# Patient Record
Sex: MALE | Race: WHITE | NOT HISPANIC OR LATINO | Employment: OTHER | URBAN - METROPOLITAN AREA
[De-identification: names, ages, dates, MRNs, and addresses within clinical notes are randomized per-mention and may not be internally consistent; named-entity substitution may affect disease eponyms.]

---

## 2017-01-04 ENCOUNTER — APPOINTMENT (OUTPATIENT)
Dept: PHYSICAL THERAPY | Facility: HOME HEALTHCARE | Age: 57
End: 2017-01-04
Payer: COMMERCIAL

## 2017-01-04 ENCOUNTER — HOSPITAL ENCOUNTER (OUTPATIENT)
Dept: MRI IMAGING | Facility: HOSPITAL | Age: 57
Discharge: HOME | End: 2017-01-04
Payer: COMMERCIAL

## 2017-01-04 DIAGNOSIS — M54.9 BACK PAIN, UNSPECIFIED BACK LOCATION, UNSPECIFIED BACK PAIN LATERALITY, UNSPECIFIED CHRONICITY: ICD-10-CM

## 2017-01-04 PROCEDURE — 72148 MRI LUMBAR SPINE W/O DYE: CPT

## 2017-01-06 ENCOUNTER — APPOINTMENT (OUTPATIENT)
Dept: PHYSICAL THERAPY | Facility: HOME HEALTHCARE | Age: 57
End: 2017-01-06
Payer: COMMERCIAL

## 2017-01-06 PROCEDURE — 97164 PT RE-EVAL EST PLAN CARE: CPT | Performed by: PHYSICAL THERAPIST

## 2017-01-06 PROCEDURE — G8982 BODY POS GOAL STATUS: HCPCS | Performed by: PHYSICAL THERAPIST

## 2017-01-06 PROCEDURE — G8981 BODY POS CURRENT STATUS: HCPCS | Performed by: PHYSICAL THERAPIST

## 2017-01-06 PROCEDURE — 97140 MANUAL THERAPY 1/> REGIONS: CPT | Performed by: PHYSICAL THERAPIST

## 2017-01-06 PROCEDURE — G8983 BODY POS D/C STATUS: HCPCS | Performed by: PHYSICAL THERAPIST

## 2017-01-06 PROCEDURE — 97110 THERAPEUTIC EXERCISES: CPT | Performed by: PHYSICAL THERAPIST

## 2018-06-13 LAB
BILIRUB UR QL STRIP: NEGATIVE
CLARITY UR: CLEAR
COLOR UR: YELLOW
GLUCOSE UR STRIP-MCNC: NEGATIVE MG/DL
HGB UR QL STRIP.AUTO: NEGATIVE
KETONES UR STRIP-MCNC: NEGATIVE MG/DL
LEUKOCYTE ESTERASE UR QL STRIP: NEGATIVE
NITRITE UR QL STRIP: NEGATIVE
PH UR STRIP.AUTO: 7 [PH] (ref 4.5–8)
PROT UR STRIP-MCNC: NEGATIVE MG/DL
SP GR UR STRIP.AUTO: 1.01 (ref 1–1.03)
UROBILINOGEN UR QL STRIP.AUTO: 0.2 EU/DL (ref 0.2–8)

## 2018-07-03 ENCOUNTER — LAB REQUISITION (OUTPATIENT)
Dept: LAB | Facility: HOSPITAL | Age: 58
End: 2018-07-03
Payer: COMMERCIAL

## 2018-07-03 DIAGNOSIS — L98.9 DISORDER OF SKIN OR SUBCUTANEOUS TISSUE: ICD-10-CM

## 2018-07-03 PROCEDURE — 88305 TISSUE EXAM BY PATHOLOGIST: CPT | Performed by: PATHOLOGY

## 2018-07-12 ENCOUNTER — TRANSCRIBE ORDERS (OUTPATIENT)
Dept: LAB | Facility: MEDICAL CENTER | Age: 58
End: 2018-07-12

## 2018-07-12 ENCOUNTER — APPOINTMENT (OUTPATIENT)
Dept: LAB | Facility: MEDICAL CENTER | Age: 58
End: 2018-07-12
Payer: COMMERCIAL

## 2018-07-12 DIAGNOSIS — M19.90 ARTHRITIS: ICD-10-CM

## 2018-07-12 DIAGNOSIS — E78.5 HYPERLIPIDEMIA, UNSPECIFIED HYPERLIPIDEMIA TYPE: ICD-10-CM

## 2018-07-12 DIAGNOSIS — I10 HYPERTENSION, UNSPECIFIED TYPE: ICD-10-CM

## 2018-07-12 DIAGNOSIS — R35.1 NOCTURIA: ICD-10-CM

## 2018-07-12 DIAGNOSIS — M19.90 ARTHRITIS: Primary | ICD-10-CM

## 2018-07-12 DIAGNOSIS — R53.83 FATIGUE, UNSPECIFIED TYPE: ICD-10-CM

## 2018-07-12 DIAGNOSIS — N40.1 BENIGN PROSTATIC HYPERPLASIA WITH LOWER URINARY TRACT SYMPTOMS, SYMPTOM DETAILS UNSPECIFIED: ICD-10-CM

## 2018-07-12 LAB
ALBUMIN SERPL BCP-MCNC: 4.2 G/DL (ref 3.5–5)
ALP SERPL-CCNC: 54 U/L (ref 46–116)
ALT SERPL W P-5'-P-CCNC: 36 U/L (ref 12–78)
ANION GAP SERPL CALCULATED.3IONS-SCNC: 8 MMOL/L (ref 4–13)
AST SERPL W P-5'-P-CCNC: 20 U/L (ref 5–45)
BILIRUB SERPL-MCNC: 0.68 MG/DL (ref 0.2–1)
BUN SERPL-MCNC: 14 MG/DL (ref 5–25)
CALCIUM SERPL-MCNC: 9.1 MG/DL (ref 8.3–10.1)
CHLORIDE SERPL-SCNC: 105 MMOL/L (ref 100–108)
CHOLEST SERPL-MCNC: 244 MG/DL (ref 50–200)
CO2 SERPL-SCNC: 26 MMOL/L (ref 21–32)
CREAT SERPL-MCNC: 1.18 MG/DL (ref 0.6–1.3)
ERYTHROCYTE [DISTWIDTH] IN BLOOD BY AUTOMATED COUNT: 13 % (ref 11.6–15.1)
GFR SERPL CREATININE-BSD FRML MDRD: 67 ML/MIN/1.73SQ M
GLUCOSE P FAST SERPL-MCNC: 91 MG/DL (ref 65–99)
HCT VFR BLD AUTO: 39.1 % (ref 36.5–49.3)
HDLC SERPL-MCNC: 33 MG/DL (ref 40–60)
HGB BLD-MCNC: 13 G/DL (ref 12–17)
LDLC SERPL CALC-MCNC: 157 MG/DL (ref 0–100)
MCH RBC QN AUTO: 31.3 PG (ref 26.8–34.3)
MCHC RBC AUTO-ENTMCNC: 33.2 G/DL (ref 31.4–37.4)
MCV RBC AUTO: 94 FL (ref 82–98)
NONHDLC SERPL-MCNC: 211 MG/DL
PLATELET # BLD AUTO: 205 THOUSANDS/UL (ref 149–390)
PMV BLD AUTO: 11.8 FL (ref 8.9–12.7)
POTASSIUM SERPL-SCNC: 4.2 MMOL/L (ref 3.5–5.3)
PROT SERPL-MCNC: 7.6 G/DL (ref 6.4–8.2)
PSA SERPL-MCNC: 2 NG/ML (ref 0–4)
RBC # BLD AUTO: 4.16 MILLION/UL (ref 3.88–5.62)
SODIUM SERPL-SCNC: 139 MMOL/L (ref 136–145)
TRIGL SERPL-MCNC: 269 MG/DL
WBC # BLD AUTO: 4.07 THOUSAND/UL (ref 4.31–10.16)

## 2018-07-12 PROCEDURE — 80061 LIPID PANEL: CPT

## 2018-07-12 PROCEDURE — 85027 COMPLETE CBC AUTOMATED: CPT

## 2018-07-12 PROCEDURE — 80053 COMPREHEN METABOLIC PANEL: CPT

## 2018-07-12 PROCEDURE — 36415 COLL VENOUS BLD VENIPUNCTURE: CPT

## 2018-07-12 PROCEDURE — G0103 PSA SCREENING: HCPCS

## 2018-08-09 ENCOUNTER — TELEPHONE (OUTPATIENT)
Dept: UROLOGY | Facility: AMBULATORY SURGERY CENTER | Age: 58
End: 2018-08-09

## 2018-08-09 NOTE — TELEPHONE ENCOUNTER
Received records for a new patient appt  Called patient and patient stated that he will call back another time since its not an emergency and his schedule is to booked up at the moment  Scanned documents to central fax and put in box at check in

## 2019-04-19 ENCOUNTER — TRANSCRIBE ORDERS (OUTPATIENT)
Dept: ADMINISTRATIVE | Facility: HOSPITAL | Age: 59
End: 2019-04-19

## 2019-04-19 DIAGNOSIS — R91.1 COIN LESION: ICD-10-CM

## 2019-04-19 DIAGNOSIS — Z87.09 PERSONAL HISTORY OF UNSPECIFIED DISEASE OF RESPIRATORY SYSTEM: Primary | ICD-10-CM

## 2019-04-22 ENCOUNTER — HOSPITAL ENCOUNTER (OUTPATIENT)
Dept: CT IMAGING | Facility: HOSPITAL | Age: 59
Discharge: HOME | End: 2019-04-22
Payer: COMMERCIAL

## 2019-04-22 DIAGNOSIS — Z87.09 PERSONAL HISTORY OF UNSPECIFIED DISEASE OF RESPIRATORY SYSTEM: ICD-10-CM

## 2019-04-22 DIAGNOSIS — R91.1 COIN LESION: ICD-10-CM

## 2019-04-22 PROCEDURE — 71250 CT THORAX DX C-: CPT

## 2019-07-22 ENCOUNTER — APPOINTMENT (OUTPATIENT)
Dept: LAB | Facility: MEDICAL CENTER | Age: 59
End: 2019-07-22
Payer: COMMERCIAL

## 2019-07-22 ENCOUNTER — TRANSCRIBE ORDERS (OUTPATIENT)
Dept: LAB | Facility: MEDICAL CENTER | Age: 59
End: 2019-07-22

## 2019-07-22 DIAGNOSIS — R53.83 OTHER FATIGUE: ICD-10-CM

## 2019-07-22 DIAGNOSIS — C44.91 BASAL CELL CARCINOMA (BCC), UNSPECIFIED SITE: Primary | ICD-10-CM

## 2019-07-22 DIAGNOSIS — N40.0 BENIGN PROSTATIC HYPERPLASIA, UNSPECIFIED WHETHER LOWER URINARY TRACT SYMPTOMS PRESENT: ICD-10-CM

## 2019-07-22 DIAGNOSIS — R35.1 NOCTURIA: ICD-10-CM

## 2019-07-22 DIAGNOSIS — Z01.812 PRE-OPERATIVE LABORATORY EXAMINATION: ICD-10-CM

## 2019-07-22 DIAGNOSIS — C44.91 BASAL CELL CARCINOMA (BCC), UNSPECIFIED SITE: ICD-10-CM

## 2019-07-22 LAB
ALBUMIN SERPL BCP-MCNC: 4 G/DL (ref 3.5–5)
ALP SERPL-CCNC: 59 U/L (ref 46–116)
ALT SERPL W P-5'-P-CCNC: 36 U/L (ref 12–78)
ANION GAP SERPL CALCULATED.3IONS-SCNC: 4 MMOL/L (ref 4–13)
AST SERPL W P-5'-P-CCNC: 19 U/L (ref 5–45)
BILIRUB SERPL-MCNC: 0.43 MG/DL (ref 0.2–1)
BUN SERPL-MCNC: 16 MG/DL (ref 5–25)
CALCIUM SERPL-MCNC: 8.8 MG/DL (ref 8.3–10.1)
CHLORIDE SERPL-SCNC: 106 MMOL/L (ref 100–108)
CO2 SERPL-SCNC: 26 MMOL/L (ref 21–32)
CREAT SERPL-MCNC: 1.09 MG/DL (ref 0.6–1.3)
ERYTHROCYTE [DISTWIDTH] IN BLOOD BY AUTOMATED COUNT: 12.5 % (ref 11.6–15.1)
GFR SERPL CREATININE-BSD FRML MDRD: 73 ML/MIN/1.73SQ M
GLUCOSE P FAST SERPL-MCNC: 100 MG/DL (ref 65–99)
HCT VFR BLD AUTO: 40.2 % (ref 36.5–49.3)
HGB BLD-MCNC: 13.7 G/DL (ref 12–17)
MCH RBC QN AUTO: 31.6 PG (ref 26.8–34.3)
MCHC RBC AUTO-ENTMCNC: 34.1 G/DL (ref 31.4–37.4)
MCV RBC AUTO: 93 FL (ref 82–98)
PLATELET # BLD AUTO: 200 THOUSANDS/UL (ref 149–390)
PMV BLD AUTO: 12.2 FL (ref 8.9–12.7)
POTASSIUM SERPL-SCNC: 4.2 MMOL/L (ref 3.5–5.3)
PROT SERPL-MCNC: 7.4 G/DL (ref 6.4–8.2)
PSA SERPL-MCNC: 2.7 NG/ML (ref 0–4)
RBC # BLD AUTO: 4.33 MILLION/UL (ref 3.88–5.62)
SODIUM SERPL-SCNC: 136 MMOL/L (ref 136–145)
WBC # BLD AUTO: 4.65 THOUSAND/UL (ref 4.31–10.16)

## 2019-07-22 PROCEDURE — G0103 PSA SCREENING: HCPCS

## 2019-07-22 PROCEDURE — 85027 COMPLETE CBC AUTOMATED: CPT

## 2019-07-22 PROCEDURE — 36415 COLL VENOUS BLD VENIPUNCTURE: CPT

## 2019-07-22 PROCEDURE — 80053 COMPREHEN METABOLIC PANEL: CPT

## 2020-07-14 ENCOUNTER — APPOINTMENT (OUTPATIENT)
Dept: RADIOLOGY | Facility: CLINIC | Age: 60
End: 2020-07-14
Payer: COMMERCIAL

## 2020-07-14 ENCOUNTER — OFFICE VISIT (OUTPATIENT)
Dept: URGENT CARE | Facility: CLINIC | Age: 60
End: 2020-07-14
Payer: COMMERCIAL

## 2020-07-14 VITALS
WEIGHT: 215 LBS | OXYGEN SATURATION: 99 % | BODY MASS INDEX: 30.1 KG/M2 | HEART RATE: 69 BPM | SYSTOLIC BLOOD PRESSURE: 136 MMHG | HEIGHT: 71 IN | RESPIRATION RATE: 18 BRPM | TEMPERATURE: 97 F | DIASTOLIC BLOOD PRESSURE: 65 MMHG

## 2020-07-14 DIAGNOSIS — R07.81 RIB PAIN ON LEFT SIDE: ICD-10-CM

## 2020-07-14 DIAGNOSIS — S22.32XA CLOSED FRACTURE OF ONE RIB OF LEFT SIDE, INITIAL ENCOUNTER: Primary | ICD-10-CM

## 2020-07-14 PROCEDURE — S9083 URGENT CARE CENTER GLOBAL: HCPCS | Performed by: PHYSICIAN ASSISTANT

## 2020-07-14 PROCEDURE — 99213 OFFICE O/P EST LOW 20 MIN: CPT | Performed by: PHYSICIAN ASSISTANT

## 2020-07-14 PROCEDURE — 71101 X-RAY EXAM UNILAT RIBS/CHEST: CPT

## 2020-07-14 RX ORDER — IBUPROFEN 800 MG/1
TABLET ORAL
COMMUNITY
Start: 2014-09-12

## 2020-07-14 RX ORDER — TAMSULOSIN HYDROCHLORIDE 0.4 MG/1
CAPSULE ORAL
COMMUNITY
Start: 2016-11-09

## 2020-07-14 RX ORDER — GABAPENTIN 300 MG/1
CAPSULE ORAL
COMMUNITY
Start: 2016-02-07

## 2020-07-14 RX ORDER — HYDROCODONE BITARTRATE AND ACETAMINOPHEN 5; 325 MG/1; MG/1
TABLET ORAL
COMMUNITY

## 2020-07-14 NOTE — PROGRESS NOTES
[facility]        NAME: Wu Aaron is a 62 y o  male  : 1960    MRN: [id number]  DATE: 2020  TIME: 12:47 PM    Assessment and Plan   Closed fracture of one rib of left side, initial encounter [S22 32XA]  1  Closed fracture of one rib of left side, initial encounter     2  Rib pain on left side  XR ribs left w pa chest min 3 views         Patient Instructions       Follow up with PCP in 3-5 days  Proceed to  ER if symptoms worsen  Chief Complaint     Chief Complaint   Patient presents with    Chest Pain     left rib pain after a fall 2 days ago with difficulty deep breathing         History of Present Illness       Patient presents with a 2 day history of left rib pain after falling in his garage  Pain is made worse with certain movements and taking deep breaths  Review of Systems   Review of Systems   Constitutional: Negative for chills and fever  Respiratory: Negative for cough and shortness of breath  Gastrointestinal: Negative for nausea and vomiting  Musculoskeletal: Positive for neck pain  Skin: Negative for rash  Neurological: Negative for weakness and numbness  Hematological: Negative for adenopathy           Current Medications       Current Outpatient Medications:     gabapentin (NEURONTIN) 300 mg capsule, gabapentin 300 mg capsule, Disp: , Rfl:     HYDROcodone-acetaminophen (NORCO) 5-325 mg per tablet, hydrocodone 5 mg-acetaminophen 325 mg tablet, Disp: , Rfl:     ibuprofen (MOTRIN) 800 mg tablet, ibuprofen 800 mg tablet, Disp: , Rfl:     tamsulosin (FLOMAX) 0 4 mg, tamsulosin 0 4 mg capsule, Disp: , Rfl:     Current Allergies     Allergies as of 2020 - Reviewed 2020   Allergen Reaction Noted    Acetaminophen Other (See Comments) 2014            The following portions of the patient's history were reviewed and updated as appropriate: allergies, current medications, past family history, past medical history, past social history, past surgical history and problem list      History reviewed  No pertinent past medical history  Past Surgical History:   Procedure Laterality Date    CERVICAL FUSION         History reviewed  No pertinent family history  Medications have been verified  Objective   /65   Pulse 69   Temp (!) 97 °F (36 1 °C) (Tympanic)   Resp 18   Ht 5' 10 5" (1 791 m)   Wt 97 5 kg (215 lb)   SpO2 99%   BMI 30 41 kg/m²        Physical Exam     Physical Exam   Constitutional: He appears well-developed and well-nourished  HENT:   Head: Normocephalic and atraumatic  Cardiovascular: Normal rate and regular rhythm  Pulmonary/Chest: Effort normal                Neurological: He is alert  Skin: Skin is warm and dry  Psychiatric: He has a normal mood and affect  Nursing note and vitals reviewed  Rib x-ray viewed by me and independently reviewed by me contemporaneously as questionable posterior 8th rib fracture

## 2020-07-14 NOTE — PATIENT INSTRUCTIONS
Rib Fracture   WHAT YOU NEED TO KNOW:   A rib fracture is a crack or break in a rib bone  Rib fractures usually heal within 6 weeks  You should be able to return to normal activities before that time  Do not wrap anything around your body to try to splint your ribs  This can prevent you from taking deep breaths and increases your risk for pneumonia  DISCHARGE INSTRUCTIONS:   Call 911 for any of the following:   · You have trouble breathing  · You have new or increased pain  Return to the emergency department if:   · Your pain does not get better, even after treatment  · You have a fever or a cough  Contact your healthcare provider if:   · You have questions or concerns about your condition or care  Medicines:   · NSAIDs  help decrease swelling and pain  NSAIDs are available without a doctor's order  Ask your healthcare provider which medicine is right for you  Ask how much to take and when to take it  Take as directed  NSAIDs can cause stomach bleeding and kidney problems if not taken correctly  · Prescription pain medicine  may be given  Ask your healthcare provider how to take this medicine safely  Some prescription pain medicines contain acetaminophen  Do not take other medicines that contain acetaminophen without talking to your healthcare provider  Too much acetaminophen may cause liver damage  Prescription pain medicine may cause constipation  Ask your healthcare provider how to prevent or treat constipation  · Take your medicine as directed  Contact your healthcare provider if you think your medicine is not helping or if you have side effects  Tell him or her if you are allergic to any medicine  Keep a list of the medicines, vitamins, and herbs you take  Include the amounts, and when and why you take them  Bring the list or the pill bottles to follow-up visits  Carry your medicine list with you in case of an emergency    Follow up with your healthcare provider as directed:  Write down your questions so you remember to ask them during your visits  Deep breathing:  Deep breathing will decrease your risk for pneumonia  Hug a pillow on the injured side while doing this exercise, to decrease pain  Take a deep breath and hold it for as long as possible  You should let the air out and then cough strongly  Deep breaths help open your airway  You may be given an incentive spirometer to help take deep breaths  Put the plastic piece in your mouth  Take a slow, deep breath  You should then let the air out and cough  Repeat these steps 10 times every hour  Rest:  Rest and limit activity to decrease swelling and pain, and allow your injury to heal  Avoid activities that may cause more pain or damage to your ribs such as, pulling, pushing, and lifting  As your pain decreases, begin movements slowly  Take short walks between rest periods  Ice:  Apply ice on the fractured area for 15 to 20 minutes every hour or as directed  Use an ice pack or put crushed ice in a plastic bag  Cover it with a towel  Ice helps prevent tissue damage and decreases swelling and pain  © 2017 [facility] Information is for End User's use only and may not be sold, redistributed or otherwise used for commercial purposes  All illustrations and images included in CareNotes® are the copyrighted property of A D A M , Inc  or [employer]  The above information is an  only  It is not intended as medical advice for individual conditions or treatments  Talk to your doctor, nurse or pharmacist before following any medical regimen to see if it is safe and effective for you

## 2020-10-14 ENCOUNTER — OFFICE VISIT (OUTPATIENT)
Dept: URGENT CARE | Facility: CLINIC | Age: 60
End: 2020-10-14
Payer: COMMERCIAL

## 2020-10-14 VITALS
OXYGEN SATURATION: 97 % | SYSTOLIC BLOOD PRESSURE: 125 MMHG | DIASTOLIC BLOOD PRESSURE: 66 MMHG | HEIGHT: 70 IN | TEMPERATURE: 97.7 F | RESPIRATION RATE: 18 BRPM | WEIGHT: 208 LBS | BODY MASS INDEX: 29.78 KG/M2 | HEART RATE: 68 BPM

## 2020-10-14 DIAGNOSIS — S30.860A TICK BITE OF LOWER BACK, INITIAL ENCOUNTER: Primary | ICD-10-CM
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
## 2020-10-14 PROCEDURE — 99213 OFFICE O/P EST LOW 20 MIN: CPT | Performed by: PHYSICIAN ASSISTANT

## 2020-10-14 PROCEDURE — S9083 URGENT CARE CENTER GLOBAL: HCPCS | Performed by: PHYSICIAN ASSISTANT

## 2020-10-14 RX ORDER — DOXYCYCLINE 100 MG/1
TABLET ORAL
Qty: 2 TABLET | Refills: 0 | Status: SHIPPED | OUTPATIENT
Start: 2020-10-14 | End: 2020-10-15

## 2020-10-27 ENCOUNTER — TRANSCRIBE ORDERS (OUTPATIENT)
Dept: LAB | Facility: MEDICAL CENTER | Age: 60
End: 2020-10-27

## 2020-10-27 ENCOUNTER — LAB (OUTPATIENT)
Dept: LAB | Facility: MEDICAL CENTER | Age: 60
End: 2020-10-27
Payer: COMMERCIAL

## 2020-10-27 DIAGNOSIS — N40.0 BENIGN PROSTATIC HYPERPLASIA, UNSPECIFIED WHETHER LOWER URINARY TRACT SYMPTOMS PRESENT: Primary | ICD-10-CM

## 2020-10-27 DIAGNOSIS — R35.1 NOCTURIA: ICD-10-CM

## 2020-10-27 DIAGNOSIS — N40.0 BENIGN PROSTATIC HYPERPLASIA, UNSPECIFIED WHETHER LOWER URINARY TRACT SYMPTOMS PRESENT: ICD-10-CM

## 2020-10-27 DIAGNOSIS — E78.5 HYPERLIPIDEMIA, UNSPECIFIED HYPERLIPIDEMIA TYPE: ICD-10-CM

## 2020-10-27 DIAGNOSIS — R53.83 FATIGUE, UNSPECIFIED TYPE: ICD-10-CM

## 2020-10-27 LAB
ALBUMIN SERPL BCP-MCNC: 4.1 G/DL (ref 3.5–5)
ALP SERPL-CCNC: 77 U/L (ref 46–116)
ALT SERPL W P-5'-P-CCNC: 37 U/L (ref 12–78)
ANION GAP SERPL CALCULATED.3IONS-SCNC: 3 MMOL/L (ref 4–13)
AST SERPL W P-5'-P-CCNC: 19 U/L (ref 5–45)
BASOPHILS # BLD AUTO: 0.07 THOUSANDS/ΜL (ref 0–0.1)
BASOPHILS NFR BLD AUTO: 1 % (ref 0–1)
BILIRUB SERPL-MCNC: 0.58 MG/DL (ref 0.2–1)
BUN SERPL-MCNC: 13 MG/DL (ref 5–25)
CALCIUM SERPL-MCNC: 8.8 MG/DL (ref 8.3–10.1)
CHLORIDE SERPL-SCNC: 106 MMOL/L (ref 100–108)
CHOLEST SERPL-MCNC: 217 MG/DL (ref 50–200)
CO2 SERPL-SCNC: 29 MMOL/L (ref 21–32)
CREAT SERPL-MCNC: 1.13 MG/DL (ref 0.6–1.3)
EOSINOPHIL # BLD AUTO: 0.25 THOUSAND/ΜL (ref 0–0.61)
EOSINOPHIL NFR BLD AUTO: 4 % (ref 0–6)
ERYTHROCYTE [DISTWIDTH] IN BLOOD BY AUTOMATED COUNT: 13.2 % (ref 11.6–15.1)
GFR SERPL CREATININE-BSD FRML MDRD: 69 ML/MIN/1.73SQ M
GLUCOSE P FAST SERPL-MCNC: 98 MG/DL (ref 65–99)
HCT VFR BLD AUTO: 41.7 % (ref 36.5–49.3)
HDLC SERPL-MCNC: 35 MG/DL
HGB BLD-MCNC: 14.1 G/DL (ref 12–17)
IMM GRANULOCYTES # BLD AUTO: 0.02 THOUSAND/UL (ref 0–0.2)
IMM GRANULOCYTES NFR BLD AUTO: 0 % (ref 0–2)
LDLC SERPL CALC-MCNC: 152 MG/DL (ref 0–100)
LYMPHOCYTES # BLD AUTO: 1.2 THOUSANDS/ΜL (ref 0.6–4.47)
LYMPHOCYTES NFR BLD AUTO: 21 % (ref 14–44)
MCH RBC QN AUTO: 31.5 PG (ref 26.8–34.3)
MCHC RBC AUTO-ENTMCNC: 33.8 G/DL (ref 31.4–37.4)
MCV RBC AUTO: 93 FL (ref 82–98)
MONOCYTES # BLD AUTO: 0.54 THOUSAND/ΜL (ref 0.17–1.22)
MONOCYTES NFR BLD AUTO: 10 % (ref 4–12)
NEUTROPHILS # BLD AUTO: 3.54 THOUSANDS/ΜL (ref 1.85–7.62)
NEUTS SEG NFR BLD AUTO: 64 % (ref 43–75)
NONHDLC SERPL-MCNC: 182 MG/DL
NRBC BLD AUTO-RTO: 0 /100 WBCS
PLATELET # BLD AUTO: 233 THOUSANDS/UL (ref 149–390)
PMV BLD AUTO: 10.4 FL (ref 8.9–12.7)
POTASSIUM SERPL-SCNC: 4.3 MMOL/L (ref 3.5–5.3)
PROT SERPL-MCNC: 7.6 G/DL (ref 6.4–8.2)
PSA SERPL-MCNC: 3.6 NG/ML (ref 0–4)
RBC # BLD AUTO: 4.47 MILLION/UL (ref 3.88–5.62)
SODIUM SERPL-SCNC: 138 MMOL/L (ref 136–145)
TRIGL SERPL-MCNC: 149 MG/DL
WBC # BLD AUTO: 5.62 THOUSAND/UL (ref 4.31–10.16)

## 2020-10-27 PROCEDURE — 85025 COMPLETE CBC W/AUTO DIFF WBC: CPT

## 2020-10-27 PROCEDURE — G0103 PSA SCREENING: HCPCS

## 2020-10-27 PROCEDURE — 36415 COLL VENOUS BLD VENIPUNCTURE: CPT

## 2020-10-27 PROCEDURE — 80053 COMPREHEN METABOLIC PANEL: CPT

## 2020-10-27 PROCEDURE — 80061 LIPID PANEL: CPT

## 2021-01-04 DIAGNOSIS — Z20.828 EXPOSURE TO SARS-ASSOCIATED CORONAVIRUS: ICD-10-CM

## 2021-01-04 PROCEDURE — U0003 INFECTIOUS AGENT DETECTION BY NUCLEIC ACID (DNA OR RNA); SEVERE ACUTE RESPIRATORY SYNDROME CORONAVIRUS 2 (SARS-COV-2) (CORONAVIRUS DISEASE [COVID-19]), AMPLIFIED PROBE TECHNIQUE, MAKING USE OF HIGH THROUGHPUT TECHNOLOGIES AS DESCRIBED BY CMS-2020-01-R: HCPCS | Performed by: NURSE PRACTITIONER

## 2021-01-07 LAB — SARS-COV-2 RNA SPEC QL NAA+PROBE: DETECTED

## 2021-06-30 ENCOUNTER — OFFICE VISIT (OUTPATIENT)
Dept: URGENT CARE | Facility: CLINIC | Age: 61
End: 2021-06-30
Payer: COMMERCIAL

## 2021-06-30 VITALS
DIASTOLIC BLOOD PRESSURE: 77 MMHG | WEIGHT: 219.4 LBS | OXYGEN SATURATION: 97 % | HEIGHT: 70 IN | RESPIRATION RATE: 20 BRPM | BODY MASS INDEX: 31.41 KG/M2 | TEMPERATURE: 98 F | SYSTOLIC BLOOD PRESSURE: 130 MMHG | HEART RATE: 76 BPM

## 2021-06-30 DIAGNOSIS — S40.861A TICK BITE OF RIGHT UPPER ARM, INITIAL ENCOUNTER: Primary | ICD-10-CM
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
## 2021-06-30 PROCEDURE — 99213 OFFICE O/P EST LOW 20 MIN: CPT | Performed by: PHYSICIAN ASSISTANT

## 2021-06-30 PROCEDURE — S9083 URGENT CARE CENTER GLOBAL: HCPCS | Performed by: PHYSICIAN ASSISTANT

## 2021-06-30 RX ORDER — DOXYCYCLINE HYCLATE 100 MG
TABLET ORAL
Qty: 2 TABLET | Refills: 0 | Status: SHIPPED | OUTPATIENT
Start: 2021-06-30 | End: 2021-07-01

## 2021-06-30 NOTE — PROGRESS NOTES
[facility]        NAME: Nikki Dean is a 56 y o  male  : 1960    MRN: [id number]  DATE: 2021  TIME: 12:37 PM    Assessment and Plan   Tick bite of right upper arm, initial encounter [S40 861A, W57  XXXA]  1  Tick bite of right upper arm, initial encounter  doxycycline hyclate (VIBRA-TABS) 100 mg tablet         Patient Instructions       Follow up with PCP in 3-5 days  Proceed to  ER if symptoms worsen  Chief Complaint     Chief Complaint   Patient presents with    Tick Removal     removed tick from right arm 3 days ago , increase redness          History of Present Illness        Patient removed a tick from his left arm approximately 3 days ago, he now has increased redness surrounding the bite site  No fever chills myalgias or arthralgias  Review of Systems   Review of Systems   Constitutional: Negative for chills and fever  Musculoskeletal: Negative for arthralgias and myalgias  Skin: Positive for wound  Negative for rash           Current Medications       Current Outpatient Medications:     gabapentin (NEURONTIN) 300 mg capsule, gabapentin 300 mg capsule, Disp: , Rfl:     HYDROcodone-acetaminophen (NORCO) 5-325 mg per tablet, hydrocodone 5 mg-acetaminophen 325 mg tablet, Disp: , Rfl:     ibuprofen (MOTRIN) 800 mg tablet, ibuprofen 800 mg tablet, Disp: , Rfl:     tamsulosin (FLOMAX) 0 4 mg, tamsulosin 0 4 mg capsule, Disp: , Rfl:     doxycycline hyclate (VIBRA-TABS) 100 mg tablet, Take 2 tabs as one oral dose, Disp: 2 tablet, Rfl: 0    oxyCODONE (OXY-IR) 5 MG capsule, take 1 capsule by mouth twice a day if needed for pain, Disp: , Rfl:     Vitamin D, Cholecalciferol, 25 MCG (1000 UT) CAPS, Take by mouth, Disp: , Rfl:     Zinc 50 MG CAPS, Take by mouth, Disp: , Rfl:     Current Allergies     Allergies as of 2021 - Reviewed 2021   Allergen Reaction Noted    Acetaminophen Other (See Comments) 2014            The following portions of the patient's history were reviewed and updated as appropriate: allergies, current medications, past family history, past medical history, past social history, past surgical history and problem list      Past Medical History:   Diagnosis Date    Spinal cord dysplasia ([address] )        Past Surgical History:   Procedure Laterality Date    CERVICAL FUSION      HERNIA REPAIR Left     NASAL SEPTOPLASTY W/ TURBINOPLASTY  08/22/2003    TOOTH EXTRACTION         Family History   Problem Relation Age of Onset    Cancer Father     Lung cancer Brother     Cancer Son          Medications have been verified  Objective   /77 (BP Location: Right arm, Patient Position: Sitting, Cuff Size: Standard)   Pulse 76   Temp 98 °F (36 7 °C)   Resp 20   Ht 5' 10" (1 778 m)   Wt 99 5 kg (219 lb 6 4 oz)   SpO2 97%   BMI 31 48 kg/m²   No LMP for male patient  Physical Exam     Physical Exam  Vitals and nursing note reviewed  Constitutional:       Appearance: Normal appearance  HENT:      Head: Normocephalic and atraumatic  Cardiovascular:      Rate and Rhythm: Normal rate and regular rhythm  Pulmonary:      Effort: Pulmonary effort is normal    Skin:     General: Skin is warm  Comments: Raised irregular area of erythema medial aspect left upper arm, adjacent to elbow  No bull's eye  No lymphadenitis  Neurological:      Mental Status: He is alert

## 2021-06-30 NOTE — PATIENT INSTRUCTIONS
Tick Bite   AMBULATORY CARE:   What you need to know about a tick bite:  Most tick bites are not dangerous, but ticks can pass disease or infection when they bite  Ticks need to be removed quickly  You may have redness, pain, itching, and swelling near the bite  Blisters may also develop  Seek care immediately if:   · You have trouble walking or moving your legs  · You have joint pain, muscle pain, or muscle weakness within 1 month of a tick bite  · You have a fever, chills, headache, or rash  Contact your healthcare provider if:   · You cannot remove the tick  · The tick's head is stuck in your skin  · You have questions or concerns about your condition or care  Treatment for a tick bite:   · Apply ice  on your bite for 15 to 20 minutes every hour or as directed  Use an ice pack, or put crushed ice in a plastic bag  Cover it with a towel before you apply it to your skin  Ice helps prevent tissue damage and decreases swelling and pain  · Medicines  help decrease pain, redness, itching, and swelling  You may also need medicine to prevent or fight a bacterial infection  These medicines may be given as a cream, lotion, or pill  How to remove a tick:  Remove the tick as soon as possible to help prevent disease or infection  You are less likely to get sick from a tick bite if you remove the tick within 24 hours  Do not use petroleum jelly, nail polish, rubbing alcohol, or heat  These do not work and may be dangerous  Do the following to remove a tick:  · First, try a soapy cotton ball  Soak a cotton ball in liquid soap  Cover the tick with the cotton ball for 30 seconds  The tick may come off with the cotton ball when you pull it away  · Use tweezers if the soapy cotton ball does not work  Grasp the tick as close to your skin as possible  Pull the tick straight up and out  Do not touch the tick with your bare hands      · Do not twist or jerk the tick suddenly, because this may break off the tick's head or mouth parts  Do not leave any part of the tick in your skin  · Do not crush or squeeze the tick since its body may be infected with germs  Flush the tick down the toilet  · After the tick is removed, clean the area of the bite with rubbing alcohol  Then wash your hands with soap and water  Prevent a tick bite:  Ticks live in areas covered by brush and grass  They may even be found in your lawn if you live in certain areas  Outdoor pets can carry ticks inside the house  Ticks can grab onto you or your clothes when you walk by grass or brush  If you go into areas that contain many trees, tall grasses, and underbrush, do the following:  · Wear light colored pants and a long-sleeved shirt  Tuck your pants into your socks or boots  Tuck in your shirt  Wear sleeves that fit close to the skin at your wrists and neck  This will help prevent ticks from crawling through gaps in your clothing and onto your skin  Wear a hat in areas with trees  · Apply insect repellant on your skin  The insect repellant should contain DEET  Do not put insect repellant on skin that is cut, scratched, or irritated  Always use soap and water to wash the insect repellant off as soon as possible once you are indoors  Do not apply insect repellant on your child's face or hands  · Spray insect repellant onto your clothes  Use permethrin spray  This spray kills ticks that crawl on your clothing  Be sure to spray the tops of your boots, bottom of pant legs, and sleeve cuffs  As soon as possible, wash and dry clothing in hot water and high heat  · Check your clothing, hair, and skin for ticks  Shower within 2 hours of coming indoors  Carefully check the hairline, armpits, neck, and waist  Check your pets and children for ticks  Remove ticks from pets the same way as you remove them from people  · Decrease the risk for ticks in your yard  Ticks like to live in shady, moist areas   Nai Hinders your lawn regularly to keep the grass short  Trim the grass around birdbaths and fences  Cut branches that are overgrown and take them out of the yard  Clear out leaf piles  Rossy Velha firewood in a dry, zoraida area  Follow up with your healthcare provider as directed:  Write down your questions so you remember to ask them during your visits  © Copyright [address] Information is for End User's use only and may not be sold, redistributed or otherwise used for commercial purposes  All illustrations and images included in CareNotes® are the copyrighted property of A D A M , Inc  or [facility]   The above information is an  only  It is not intended as medical advice for individual conditions or treatments  Talk to your doctor, nurse or pharmacist before following any medical regimen to see if it is safe and effective for you

## 2022-01-28 ENCOUNTER — APPOINTMENT (OUTPATIENT)
Dept: LAB | Facility: HOSPITAL | Age: 62
End: 2022-01-28
Payer: COMMERCIAL

## 2022-01-28 ENCOUNTER — HOSPITAL ENCOUNTER (OUTPATIENT)
Dept: RADIOLOGY | Facility: HOSPITAL | Age: 62
Discharge: HOME | End: 2022-01-28
Payer: COMMERCIAL

## 2022-01-28 DIAGNOSIS — R05.9 COUGH: ICD-10-CM

## 2022-01-28 DIAGNOSIS — R35.1 NOCTURIA: ICD-10-CM

## 2022-01-28 DIAGNOSIS — R53.1 WEAKNESS: ICD-10-CM

## 2022-01-28 DIAGNOSIS — Z12.5 SPECIAL SCREENING, PROSTATE CANCER: ICD-10-CM

## 2022-01-28 DIAGNOSIS — R53.83 FATIGUE, UNSPECIFIED TYPE: ICD-10-CM

## 2022-01-28 DIAGNOSIS — E78.5 HYPERLIPIDEMIA, UNSPECIFIED HYPERLIPIDEMIA TYPE: ICD-10-CM

## 2022-01-28 LAB
ALBUMIN SERPL BCP-MCNC: 4.3 G/DL (ref 3.5–5)
ALP SERPL-CCNC: 65 U/L (ref 46–116)
ALT SERPL W P-5'-P-CCNC: 32 U/L (ref 12–78)
ANION GAP SERPL CALCULATED.3IONS-SCNC: 6 MMOL/L (ref 4–13)
AST SERPL W P-5'-P-CCNC: 20 U/L (ref 5–45)
BASOPHILS # BLD AUTO: 0.07 THOUSANDS/ΜL (ref 0–0.1)
BASOPHILS NFR BLD AUTO: 1 % (ref 0–1)
BILIRUB SERPL-MCNC: 0.7 MG/DL (ref 0.2–1)
BUN SERPL-MCNC: 12 MG/DL (ref 5–25)
CALCIUM SERPL-MCNC: 9.1 MG/DL (ref 8.3–10.1)
CHLORIDE SERPL-SCNC: 103 MMOL/L (ref 100–108)
CHOLEST SERPL-MCNC: 238 MG/DL
CO2 SERPL-SCNC: 30 MMOL/L (ref 21–32)
CREAT SERPL-MCNC: 1.2 MG/DL (ref 0.6–1.3)
EOSINOPHIL # BLD AUTO: 0.2 THOUSAND/ΜL (ref 0–0.61)
EOSINOPHIL NFR BLD AUTO: 3 % (ref 0–6)
ERYTHROCYTE [DISTWIDTH] IN BLOOD BY AUTOMATED COUNT: 12.5 % (ref 11.6–15.1)
GFR SERPL CREATININE-BSD FRML MDRD: 63 ML/MIN/1.73SQ M
GLUCOSE P FAST SERPL-MCNC: 98 MG/DL (ref 65–99)
HCT VFR BLD AUTO: 42.4 % (ref 36.5–49.3)
HDLC SERPL-MCNC: 37 MG/DL
HGB BLD-MCNC: 13.9 G/DL (ref 12–17)
IMM GRANULOCYTES # BLD AUTO: 0.04 THOUSAND/UL (ref 0–0.2)
IMM GRANULOCYTES NFR BLD AUTO: 1 % (ref 0–2)
LDLC SERPL CALC-MCNC: 164 MG/DL (ref 0–100)
LYMPHOCYTES # BLD AUTO: 2.14 THOUSANDS/ΜL (ref 0.6–4.47)
LYMPHOCYTES NFR BLD AUTO: 35 % (ref 14–44)
MCH RBC QN AUTO: 30.2 PG (ref 26.8–34.3)
MCHC RBC AUTO-ENTMCNC: 32.8 G/DL (ref 31.4–37.4)
MCV RBC AUTO: 92 FL (ref 82–98)
MONOCYTES # BLD AUTO: 0.47 THOUSAND/ΜL (ref 0.17–1.22)
MONOCYTES NFR BLD AUTO: 8 % (ref 4–12)
NEUTROPHILS # BLD AUTO: 3.21 THOUSANDS/ΜL (ref 1.85–7.62)
NEUTS SEG NFR BLD AUTO: 52 % (ref 43–75)
NONHDLC SERPL-MCNC: 201 MG/DL
NRBC BLD AUTO-RTO: 0 /100 WBCS
PLATELET # BLD AUTO: 276 THOUSANDS/UL (ref 149–390)
PMV BLD AUTO: 10.1 FL (ref 8.9–12.7)
POTASSIUM SERPL-SCNC: 4.3 MMOL/L (ref 3.5–5.3)
PROT SERPL-MCNC: 7.9 G/DL (ref 6.4–8.2)
PSA SERPL-MCNC: 5.2 NG/ML (ref 0–4)
RBC # BLD AUTO: 4.61 MILLION/UL (ref 3.88–5.62)
SODIUM SERPL-SCNC: 139 MMOL/L (ref 136–145)
TRIGL SERPL-MCNC: 185 MG/DL
TSH SERPL DL<=0.05 MIU/L-ACNC: 2.18 UIU/ML (ref 0.36–3.74)
WBC # BLD AUTO: 6.13 THOUSAND/UL (ref 4.31–10.16)

## 2022-01-28 PROCEDURE — 71046 X-RAY EXAM CHEST 2 VIEWS: CPT

## 2022-01-28 PROCEDURE — 80053 COMPREHEN METABOLIC PANEL: CPT

## 2022-01-28 PROCEDURE — 36415 COLL VENOUS BLD VENIPUNCTURE: CPT

## 2022-01-28 PROCEDURE — 80061 LIPID PANEL: CPT

## 2022-01-28 PROCEDURE — G0103 PSA SCREENING: HCPCS

## 2022-01-28 PROCEDURE — 84443 ASSAY THYROID STIM HORMONE: CPT

## 2022-01-28 PROCEDURE — 85025 COMPLETE CBC W/AUTO DIFF WBC: CPT

## 2022-08-10 ENCOUNTER — APPOINTMENT (OUTPATIENT)
Dept: LAB | Facility: HOSPITAL | Age: 62
End: 2022-08-10
Payer: COMMERCIAL

## 2022-08-10 DIAGNOSIS — E78.5 HYPERLIPIDEMIA, UNSPECIFIED HYPERLIPIDEMIA TYPE: ICD-10-CM

## 2022-08-10 DIAGNOSIS — R73.01 IMPAIRED FASTING GLUCOSE: ICD-10-CM

## 2022-08-10 DIAGNOSIS — M13.80 MIGRATORY POLYARTHRITIS: ICD-10-CM

## 2022-08-10 DIAGNOSIS — R53.83 FATIGUE, UNSPECIFIED TYPE: ICD-10-CM

## 2022-08-10 DIAGNOSIS — I10 BENIGN HYPERTENSION: ICD-10-CM

## 2022-08-10 LAB
ALBUMIN SERPL BCP-MCNC: 4 G/DL (ref 3.5–5)
ALP SERPL-CCNC: 66 U/L (ref 46–116)
ALT SERPL W P-5'-P-CCNC: 30 U/L (ref 12–78)
ANION GAP SERPL CALCULATED.3IONS-SCNC: 7 MMOL/L (ref 4–13)
AST SERPL W P-5'-P-CCNC: 16 U/L (ref 5–45)
BASOPHILS # BLD AUTO: 0.05 THOUSANDS/ΜL (ref 0–0.1)
BASOPHILS NFR BLD AUTO: 1 % (ref 0–1)
BILIRUB SERPL-MCNC: 0.63 MG/DL (ref 0.2–1)
BUN SERPL-MCNC: 9 MG/DL (ref 5–25)
CALCIUM SERPL-MCNC: 8.7 MG/DL (ref 8.3–10.1)
CHLORIDE SERPL-SCNC: 104 MMOL/L (ref 96–108)
CHOLEST SERPL-MCNC: 186 MG/DL
CO2 SERPL-SCNC: 29 MMOL/L (ref 21–32)
CREAT SERPL-MCNC: 1.05 MG/DL (ref 0.6–1.3)
EOSINOPHIL # BLD AUTO: 0.21 THOUSAND/ΜL (ref 0–0.61)
EOSINOPHIL NFR BLD AUTO: 4 % (ref 0–6)
ERYTHROCYTE [DISTWIDTH] IN BLOOD BY AUTOMATED COUNT: 12.3 % (ref 11.6–15.1)
GFR SERPL CREATININE-BSD FRML MDRD: 74 ML/MIN/1.73SQ M
GLUCOSE P FAST SERPL-MCNC: 105 MG/DL (ref 65–99)
HCT VFR BLD AUTO: 36.1 % (ref 36.5–49.3)
HDLC SERPL-MCNC: 31 MG/DL
HGB BLD-MCNC: 12.1 G/DL (ref 12–17)
IMM GRANULOCYTES # BLD AUTO: 0.01 THOUSAND/UL (ref 0–0.2)
IMM GRANULOCYTES NFR BLD AUTO: 0 % (ref 0–2)
LDLC SERPL CALC-MCNC: 113 MG/DL (ref 0–100)
LYMPHOCYTES # BLD AUTO: 1.89 THOUSANDS/ΜL (ref 0.6–4.47)
LYMPHOCYTES NFR BLD AUTO: 31 % (ref 14–44)
MCH RBC QN AUTO: 30.6 PG (ref 26.8–34.3)
MCHC RBC AUTO-ENTMCNC: 33.5 G/DL (ref 31.4–37.4)
MCV RBC AUTO: 91 FL (ref 82–98)
MONOCYTES # BLD AUTO: 0.51 THOUSAND/ΜL (ref 0.17–1.22)
MONOCYTES NFR BLD AUTO: 9 % (ref 4–12)
NEUTROPHILS # BLD AUTO: 3.35 THOUSANDS/ΜL (ref 1.85–7.62)
NEUTS SEG NFR BLD AUTO: 55 % (ref 43–75)
NONHDLC SERPL-MCNC: 155 MG/DL
NRBC BLD AUTO-RTO: 0 /100 WBCS
PLATELET # BLD AUTO: 190 THOUSANDS/UL (ref 149–390)
PMV BLD AUTO: 10.5 FL (ref 8.9–12.7)
POTASSIUM SERPL-SCNC: 4.3 MMOL/L (ref 3.5–5.3)
PROT SERPL-MCNC: 7.3 G/DL (ref 6.4–8.4)
RBC # BLD AUTO: 3.95 MILLION/UL (ref 3.88–5.62)
SODIUM SERPL-SCNC: 140 MMOL/L (ref 135–147)
TRIGL SERPL-MCNC: 208 MG/DL
WBC # BLD AUTO: 6.02 THOUSAND/UL (ref 4.31–10.16)

## 2022-08-10 PROCEDURE — 84154 ASSAY OF PSA FREE: CPT

## 2022-08-10 PROCEDURE — 85025 COMPLETE CBC W/AUTO DIFF WBC: CPT

## 2022-08-10 PROCEDURE — 80061 LIPID PANEL: CPT

## 2022-08-10 PROCEDURE — 80053 COMPREHEN METABOLIC PANEL: CPT

## 2022-08-10 PROCEDURE — 84153 ASSAY OF PSA TOTAL: CPT

## 2022-08-10 PROCEDURE — 36415 COLL VENOUS BLD VENIPUNCTURE: CPT

## 2022-08-12 LAB
PSA FREE MFR SERPL: 29.2 %
PSA FREE SERPL-MCNC: 2.6 NG/ML
PSA SERPL-MCNC: 8.9 NG/ML (ref 0–4)

## 2022-08-22 ENCOUNTER — OFFICE VISIT (OUTPATIENT)
Dept: UROLOGY | Facility: CLINIC | Age: 62
End: 2022-08-22
Payer: COMMERCIAL

## 2022-08-22 VITALS
WEIGHT: 220 LBS | SYSTOLIC BLOOD PRESSURE: 122 MMHG | HEIGHT: 70 IN | DIASTOLIC BLOOD PRESSURE: 80 MMHG | BODY MASS INDEX: 31.5 KG/M2

## 2022-08-22 DIAGNOSIS — R97.20 ELEVATED PSA: Primary | ICD-10-CM

## 2022-08-22 DIAGNOSIS — N40.1 BENIGN PROSTATIC HYPERPLASIA WITH NOCTURIA: ICD-10-CM

## 2022-08-22 DIAGNOSIS — R35.1 BENIGN PROSTATIC HYPERPLASIA WITH NOCTURIA: ICD-10-CM

## 2022-08-22 PROCEDURE — 99203 OFFICE O/P NEW LOW 30 MIN: CPT

## 2022-08-22 RX ORDER — TAMSULOSIN HYDROCHLORIDE 0.4 MG/1
0.8 CAPSULE ORAL DAILY
Qty: 180 CAPSULE | Refills: 3 | Status: SHIPPED | OUTPATIENT
Start: 2022-08-22

## 2022-10-06 ENCOUNTER — HOSPITAL ENCOUNTER (OUTPATIENT)
Dept: RADIOLOGY | Age: 62
Discharge: HOME | End: 2022-10-06
Payer: COMMERCIAL

## 2022-10-06 DIAGNOSIS — R97.20 ELEVATED PSA: ICD-10-CM

## 2022-10-06 PROCEDURE — 72197 MRI PELVIS W/O & W/DYE: CPT

## 2022-10-06 PROCEDURE — G1004 CDSM NDSC: HCPCS

## 2022-10-06 PROCEDURE — 76377 3D RENDER W/INTRP POSTPROCES: CPT

## 2022-10-06 PROCEDURE — A9585 GADOBUTROL INJECTION: HCPCS

## 2022-10-06 RX ADMIN — GADOBUTROL 10 ML: 604.72 INJECTION INTRAVENOUS at 12:04

## 2022-10-12 ENCOUNTER — TELEPHONE (OUTPATIENT)
Dept: UROLOGY | Facility: CLINIC | Age: 62
End: 2022-10-12

## 2022-10-12 NOTE — TELEPHONE ENCOUNTER
I spoke with patient via telephone  I confirmed his name and date of birth prior to my telephone encounter  I spoke with patient regarding most recent mpMRI results from 10/06/2022  This showed no dominant lesion within the prostate  There is a heterogeneous peripheral zone  PI-RADS category 2 low clinically significant cancer unlikely  Patient does have a markedly enlarged prostate measuring 122 cc  There is evidence of mild bladder wall thickening and trabeculation likely related to chronic outlet obstruction  Enlarged median lobe protrudes into the bladder base  No extraprostatic tumor, seminal vesicle invasion, pelvic lymphadenopathy, or pelvic osseous metastatic disease  Patient does have a history of BPH and during his last office visit with me in August I would increase his tamsulosin to 0 8 mg daily  He reports that he reduce this to 0 4 mg daily  He continues to have a weak urinary stream, urinary frequency, nocturia  I discussed adding finasteride 5 mg daily in addition to tamsulosin, he is unsure he would like to start this currently due to potential side effects of erectile dysfunction and gynecomastia  We will better discussed this at his upcoming appointment with me in 1 month  Otherwise we will repeat PSA testing and 6 months  Patient verbalized understanding is agreeable to plan      Karlos Howard

## 2022-11-14 ENCOUNTER — APPOINTMENT (OUTPATIENT)
Dept: LAB | Facility: HOSPITAL | Age: 62
End: 2022-11-14

## 2022-11-14 DIAGNOSIS — R97.20 ELEVATED PSA: ICD-10-CM

## 2022-11-14 LAB — PSA SERPL-MCNC: 15.7 NG/ML (ref 0–4)

## 2022-11-17 ENCOUNTER — OFFICE VISIT (OUTPATIENT)
Dept: UROLOGY | Facility: CLINIC | Age: 62
End: 2022-11-17

## 2022-11-17 VITALS
WEIGHT: 211 LBS | SYSTOLIC BLOOD PRESSURE: 126 MMHG | DIASTOLIC BLOOD PRESSURE: 78 MMHG | HEART RATE: 68 BPM | BODY MASS INDEX: 30.28 KG/M2

## 2022-11-17 DIAGNOSIS — R97.20 ELEVATED PSA: Primary | ICD-10-CM

## 2022-11-17 DIAGNOSIS — N40.0 BENIGN PROSTATIC HYPERPLASIA, UNSPECIFIED WHETHER LOWER URINARY TRACT SYMPTOMS PRESENT: ICD-10-CM

## 2022-11-17 LAB

## 2022-11-17 NOTE — PROGRESS NOTES
11/17/2022    No chief complaint on file  Assessment and Plan    57 y o  male     1  Elevated PSA  · mpMRI PI-RADS 2  · 122 0 cc prostate gland  · PSA Trend  · 15 7 (11/14/2022)  · 8 9 (08/10/2022)  · 5 2 (01/28/2022)  · 3 6 (10/27/2020)  · Given continued rise in PSA despite negative MRI, I have recommend a Transperineal Prostate biopsy  He is unsure at this time if he would like to proceed with a biopsy and would rather recheck his PSA in a couple weeks  We will recheck a PSA in 6-8 weeks and he will follow-up with Dr Lindsay Santiago in 2 months to review those results  2  BPH with LUTS  · PVR today 216 mL  · Urine dip today negative for leukocytes, nitrates, or blood  · Recommend increasing tamsulosin 0 4 mg bid  · Discussed possible addition of finasteride 5 mg if symptoms persist    · His BPH with incomplete emptying may be attributing to his elevated PSA as well  History of Present Illness  Deepali Cochran is a 57 y o  male here for follow-up evaluation of elevated PSA  He was initially seen by me on 08/22/2022 for evaluation of BPH with LUTS and elevated PSA  His PSA at that time was 8 9on 8/10/2022, previously  5 2 on 1/28/2022 and 3 6 on 10/27/2020  He denied any family history of prostate cancer and his ALLEY was benign  Given his continued rise in PSA I had discussed options of a mpMRI of the prostate for which he underwent on 10/06/2022 which showed a PI-RADS category 2 score a prostate volume of 122 0 cc  He had a repeat PSA on 11/14/2022 which was 15 7  In regards to his BPH, I had recommended increasing Flomax to 0 8 mg daily  He reports he did not try this and continued with Flomax at 0 4 mg daily  He continues to complain of a weak urinary stream, nocturia, and incomplete bladder emptying  His PVR today was 216 mL  He is in no acute distress and denies any abdominal pain, or flank  Review of Systems   Constitutional: Negative for chills and fever     HENT: Negative for congestion and sore throat  Respiratory: Negative for cough and shortness of breath  Cardiovascular: Negative for chest pain and leg swelling  Gastrointestinal: Negative for abdominal pain, constipation, diarrhea, nausea and vomiting  Genitourinary: Positive for difficulty urinating, frequency and urgency  Negative for dysuria and hematuria  Musculoskeletal: Negative for back pain and gait problem  Skin: Negative for wound  Allergic/Immunologic: Negative for immunocompromised state  Neurological: Negative for dizziness, weakness and numbness  Hematological: Does not bruise/bleed easily  Vitals  Vitals:    11/17/22 0812   BP: 126/78   Pulse: 68   Weight: 95 7 kg (211 lb)       Physical Exam  Vitals reviewed  Constitutional:       General: He is not in acute distress  Appearance: Normal appearance  He is not ill-appearing or toxic-appearing  HENT:      Head: Normocephalic and atraumatic  Eyes:      General: No scleral icterus  Conjunctiva/sclera: Conjunctivae normal    Cardiovascular:      Rate and Rhythm: Normal rate  Pulmonary:      Effort: Pulmonary effort is normal  No respiratory distress  Abdominal:      Palpations: Abdomen is soft  Tenderness: There is no abdominal tenderness  There is no right CVA tenderness or left CVA tenderness  Hernia: No hernia is present  Musculoskeletal:      Cervical back: Normal range of motion  Right lower leg: No edema  Left lower leg: No edema  Skin:     General: Skin is warm and dry  Coloration: Skin is not jaundiced or pale  Neurological:      General: No focal deficit present  Mental Status: He is alert and oriented to person, place, and time  Mental status is at baseline  Gait: Gait normal    Psychiatric:         Mood and Affect: Mood normal          Behavior: Behavior normal          Thought Content:  Thought content normal          Judgment: Judgment normal          Past History  Past Medical History:   Diagnosis Date   • Spinal cord dysplasia ([facility] [address] )      Social History     Socioeconomic History   • Marital status: Single     Spouse name: None   • Number of children: None   • Years of education: None   • Highest education level: None   Occupational History   • None   Tobacco Use   • Smoking status: Former   • Smokeless tobacco: Never   Vaping Use   • Vaping Use: Never used   Substance and Sexual Activity   • Alcohol use: None   • Drug use: Not Currently   • Sexual activity: None   Other Topics Concern   • None   Social History Narrative   • None     Social Determinants of Health     Financial Resource Strain: Not on file   Food Insecurity: Not on file   Transportation Needs: Not on file   Physical Activity: Not on file   Stress: Not on file   Social Connections: Not on file   Intimate Partner Violence: Not on file   Housing Stability: Not on file     Social History     Tobacco Use   Smoking Status Former   Smokeless Tobacco Never     Family History   Problem Relation Age of Onset   • Cancer Father    • Lung cancer Brother    • Cancer Son        The following portions of the patient's history were reviewed and updated as appropriate allergies, current medications, past medical history, past social history, past surgical history and problem list    Imaging:    10/06/2022  MULTIPARAMETRIC MRI OF THE PROSTATE WITH AND WITHOUT CONTRAST-WITH 3-D POSTPROCESSING      INDICATION:   R97 20: Elevated prostate specific antigen (PSA)      COMPARISON: None     PSA LEVEL: 8 9 ng/mL on 8/10/2022  PRIOR BIOPSY DATE: No prior biopsy  BIOPSY RESULTS: Not applicable      TECHNIQUE: The following pulse sequences were obtained:  Small field-of-view axial T1-weighted and multiplanar T2-weighted images; DWI axial and ADC map; large field of view axial T2 weighted images; T1w in-phase and opposed-phase axials of entire pelvis   and dynamic 3D T1w axial before and during IV contrast injection    Imaging performed on 3 0T MRI      CONTRAST:  Gadobutrol (Gadavist) 10 mL of Gadobutrol injection (SINGLE-DOSE)     TECHNICAL LIMITATIONS: None      FINDINGS:     PROSTATE:     Size: 7 1 x 5 8 x 7 1 cm = 122 0 cc  Post-biopsy hemorrhage:  None  Central gland enlargement (BPH): Marked      Focal lesions - No dominant lesion  Heterogeneous peripheral zone  Findings of BPH with a mostly encapsulated OR a homogenous circumscribed nodule without encapsulation OR a homogenous hypointense area between nodules  PI-RADSv2 1 Category 2 - Low   (clinically significant cancer unlikely)      SEMINAL VESICLES: Unremarkable     Note: Clinically significant cancer is defined on pathology/histology as Wright score greater than or equal to 7, and/or volume of greater than or equal to 0 5 mL, and/or extraprostatic extension      URINARY BLADDER: Mild bladder wall thickening and trabeculation likely related to chronic outlet obstruction  Enlarged median lobe protrudes into the bladder base      LYMPH NODES: No pelvic lymphadenopathy      BONES: No suspicious osseous lesion      OTHER:  Fat-containing right inguinal hernia extends into the right scrotum  Diverticulosis coli         IMPRESSION:     1  PI-RADSv2 1 Category 2 - Low (clinically significant cancer is unlikely to be present)      2  No extraprostatic tumor, seminal vesicle invasion, pelvic lymphadenopathy, or pelvic osseous metastatic disease      3  Calculated prostate volume of 122 0 cc      Prostate gland boundaries were segmented using 3D advanced post-processing on an independent [employer] system workstation with active physician participation          Results  Recent Results (from the past 1 hour(s))   POCT urine dip    Collection Time: 11/17/22  8:39 AM   Result Value Ref Range    LEUKOCYTE ESTERASE,UA -     NITRITE,UA -     SL AMB POCT UROBILINOGEN 0 2     POCT URINE PROTEIN -      PH,UA 5 0     BLOOD,UA -     SPECIFIC GRAVITY,UA 1 020     KETONES,UA -     BILIRUBIN,UA -     GLUCOSE, UA -      COLOR,UA yellow     CLARITY,UA clear    POCT Measure PVR    Collection Time: 11/17/22  8:39 AM   Result Value Ref Range    POST-VOID RESIDUAL VOLUME, ML  mL   ]  Lab Results   Component Value Date    PSA 15 7 (H) 11/14/2022    PSA 8 9 (H) 08/10/2022    PSA 5 2 (H) 01/28/2022    PSA 3 6 10/27/2020     Lab Results   Component Value Date    GLUCOSE 89 06/07/2014    CALCIUM 8 7 08/10/2022     06/07/2014    K 4 3 08/10/2022    CO2 29 08/10/2022     08/10/2022    BUN 9 08/10/2022    CREATININE 1 05 08/10/2022     Lab Results   Component Value Date    WBC 6 02 08/10/2022    HGB 12 1 08/10/2022    HCT 36 1 (L) 08/10/2022    MCV 91 08/10/2022     08/10/2022       Please Note:  Voice dictation software has been used to create this document  There may be inadvertent transcriptions errors       Connell Kayser

## 2022-12-08 ENCOUNTER — TELEPHONE (OUTPATIENT)
Dept: OTHER | Facility: OTHER | Age: 62
End: 2022-12-08

## 2022-12-08 NOTE — TELEPHONE ENCOUNTER
Due to his elevated PSA which continues to rise significantly despite his MRI PI-RADS 2 I would recommend he proceed with a prostate biopsy  This can be scheduled for him at [facility] ([facility])    I will place orders

## 2022-12-08 NOTE — TELEPHONE ENCOUNTER
Patient calling in stating that at his last appt he was suggested a biopsy versus increasing flomax for increased PSA level  He would like to let the provider know that he is not feeling better on the increased medication and he would like to know if he should now get the biopsy  Please call patient back to discuss possibly getting the biopsy sooner

## 2022-12-29 ENCOUNTER — TELEPHONE (OUTPATIENT)
Dept: UROLOGY | Facility: CLINIC | Age: 62
End: 2022-12-29

## 2022-12-29 NOTE — TELEPHONE ENCOUNTER
Spoke with patient who wishes to put transperineal biopsy on hold at this time  He wishes to have PSA repeated prior to follow up with Dr Ellen Fong on 1/20/23, then further discuss biopsy with Dr Ellen Fong at that time  Patient requested I let surgery scheduler know

## 2023-01-13 ENCOUNTER — APPOINTMENT (OUTPATIENT)
Dept: LAB | Facility: MEDICAL CENTER | Age: 63
End: 2023-01-13

## 2023-01-13 DIAGNOSIS — R97.20 ELEVATED PSA: ICD-10-CM

## 2023-01-13 LAB — PSA SERPL-MCNC: 8.8 NG/ML (ref 0–4)

## 2023-01-13 RX ORDER — ALBUTEROL SULFATE 90 UG/1
AEROSOL, METERED RESPIRATORY (INHALATION)
COMMUNITY
Start: 2022-12-29

## 2023-01-19 NOTE — PROGRESS NOTES
[address] [facility]  [facility]  [address] [facility]  [place], [address] [facility]  [phone]  www  [facility]  org      NAME: Mil Hughes  AGE: 57 y o  SEX: male  : 1960   MRN: [id number]    DATE: 2023  TIME: 11:33 AM    Assessment and Plan:    BPH with obstruction with large median lobe with incomplete bladder emptying  This is also most likely the source of his elevated PSA  We discussed finasteride versus proceeding with TURP  I do not think he needs a biopsy as his PSA dropped to 8 0  If we start finasteride I will also drop the PSA hopefully by more than 50%  Therefore will be diagnostic as well as therapeutic  I do not have any absolute indications to do TURP presently which I think would be the operation which would benefit him the most so we will start him on the finasteride and have him follow-up in 6 months with a postvoid residual   We will also check a PSA at that time  The procedure of TURP was explained along with the side effects possibly of bleeding infection need for additional procedures retrograde ejaculation impotence and incontinence  Chief Complaint     Chief Complaint   Patient presents with   • Elevated PSA       History of Present Illness   Follow-up BPH with obstruction with incomplete bladder emptying, last PVR was 216 cc when he saw Mr Nicho Evans 2022, he is on Flomax 0 8 mg daily  He takes to 2 capsules in the morning after breakfast   This has led to some improvement but he still does not feel that he empties all the way  PVR today is 247 cc  Has difficulty voiding, nocturia 2x, I personally reviewed his MRI and this shows a very large median lobe and a very large transition zone  You can tell that he is obstructed  Follow-up elevated PSA: MRI was PI-RADS 2, showed 122 cc prostate gland, and his PSA was 15 7 2022, 8 9 August 10, 2022, 5  and 3   PSA dropped nicely to 8 8 as of January 13, 2023  The following portions of the patient's history were reviewed and updated as appropriate: allergies, current medications, past family history, past medical history, past social history, past surgical history and problem list   Past Medical History:   Diagnosis Date   • Spinal cord dysplasia ([address] )      Past Surgical History:   Procedure Laterality Date   • CERVICAL FUSION     • HERNIA REPAIR Left    • NASAL SEPTOPLASTY W/ TURBINOPLASTY  08/22/2003   • TOOTH EXTRACTION       shoulder  Review of Systems   Review of Systems   Genitourinary:        As per HPI       Active Problem List   There is no problem list on file for this patient  Objective   /80 (BP Location: Left arm, Patient Position: Sitting, Cuff Size: Large)   Pulse 63   Ht 5' 10" (1 778 m)   Wt 94 3 kg (208 lb)   BMI 29 84 kg/m²     Physical Exam  Vitals reviewed  Constitutional:       Appearance: Normal appearance  HENT:      Head: Normocephalic and atraumatic  Eyes:      Extraocular Movements: Extraocular movements intact  Pulmonary:      Effort: Pulmonary effort is normal    Musculoskeletal:         General: Normal range of motion  Cervical back: Normal range of motion  Skin:     Coloration: Skin is not jaundiced or pale  Neurological:      General: No focal deficit present  Mental Status: He is alert and oriented to person, place, and time  Psychiatric:         Mood and Affect: Mood normal          Behavior: Behavior normal          Thought Content:  Thought content normal          Judgment: Judgment normal              Current Medications     Current Outpatient Medications:   •  albuterol (PROVENTIL HFA,VENTOLIN HFA) 90 mcg/act inhaler, inhale 2 puffs by mouth and INTO THE LUNGS every 4 to 6 hours if needed, Disp: , Rfl:   •  tamsulosin (FLOMAX) 0 4 mg, Take 2 capsules (0 8 mg total) by mouth in the morning, Disp: 180 capsule, Rfl: 3  •  Vitamin D, Cholecalciferol, 25 MCG (1000 UT) CAPS, Take by mouth, Disp: , Rfl:   •  Zinc 50 MG CAPS, Take by mouth, Disp: , Rfl:   •  gabapentin (NEURONTIN) 300 mg capsule, gabapentin 300 mg capsule, Disp: , Rfl:   •  HYDROcodone-acetaminophen (NORCO) 5-325 mg per tablet, hydrocodone 5 mg-acetaminophen 325 mg tablet, Disp: , Rfl:   •  ibuprofen (MOTRIN) 800 mg tablet, ibuprofen 800 mg tablet, Disp: , Rfl:   •  oxyCODONE (OXY-IR) 5 MG capsule, take 1 capsule by mouth twice a day if needed for pain (Patient not taking: Reported on 1/20/2023), Disp: , Rfl:         Arthur Saavedra MD

## 2023-01-20 ENCOUNTER — OFFICE VISIT (OUTPATIENT)
Dept: UROLOGY | Facility: CLINIC | Age: 63
End: 2023-01-20

## 2023-01-20 VITALS
HEART RATE: 63 BPM | HEIGHT: 70 IN | SYSTOLIC BLOOD PRESSURE: 118 MMHG | WEIGHT: 208 LBS | BODY MASS INDEX: 29.78 KG/M2 | DIASTOLIC BLOOD PRESSURE: 80 MMHG

## 2023-01-20 DIAGNOSIS — G95.9 MYELOPATHY (HCC): ICD-10-CM

## 2023-01-20 DIAGNOSIS — R33.9 INCOMPLETE BLADDER EMPTYING: ICD-10-CM

## 2023-01-20 DIAGNOSIS — N40.0 BENIGN PROSTATIC HYPERPLASIA, UNSPECIFIED WHETHER LOWER URINARY TRACT SYMPTOMS PRESENT: Primary | ICD-10-CM

## 2023-01-20 DIAGNOSIS — R97.20 ELEVATED PSA: ICD-10-CM

## 2023-01-20 LAB — POST-VOID RESIDUAL VOLUME, ML POC: 247 ML

## 2023-01-20 RX ORDER — FINASTERIDE 5 MG/1
5 TABLET, FILM COATED ORAL DAILY
Qty: 90 TABLET | Refills: 3 | Status: SHIPPED | OUTPATIENT
Start: 2023-01-20

## 2023-01-30 ENCOUNTER — HOSPITAL ENCOUNTER (EMERGENCY)
Facility: HOSPITAL | Age: 63
Discharge: HOME | End: 2023-01-30
Attending: EMERGENCY MEDICINE

## 2023-01-30 ENCOUNTER — APPOINTMENT (EMERGENCY)
Dept: CT IMAGING | Facility: HOSPITAL | Age: 63
End: 2023-01-30

## 2023-01-30 VITALS
RESPIRATION RATE: 20 BRPM | OXYGEN SATURATION: 98 % | TEMPERATURE: 97.6 F | SYSTOLIC BLOOD PRESSURE: 151 MMHG | HEIGHT: 70 IN | HEART RATE: 91 BPM | WEIGHT: 210 LBS | DIASTOLIC BLOOD PRESSURE: 90 MMHG | BODY MASS INDEX: 30.06 KG/M2

## 2023-01-30 DIAGNOSIS — H53.9 VISUAL CHANGES: ICD-10-CM

## 2023-01-30 DIAGNOSIS — R51.9 HEADACHE: Primary | ICD-10-CM

## 2023-01-30 LAB
ANION GAP SERPL CALCULATED.3IONS-SCNC: 7 MMOL/L (ref 4–13)
BUN SERPL-MCNC: 16 MG/DL (ref 5–25)
CALCIUM SERPL-MCNC: 9.7 MG/DL (ref 8.4–10.2)
CARDIAC TROPONIN I PNL SERPL HS: <2 NG/L
CHLORIDE SERPL-SCNC: 102 MMOL/L (ref 96–108)
CO2 SERPL-SCNC: 28 MMOL/L (ref 21–32)
CREAT SERPL-MCNC: 1.1 MG/DL (ref 0.6–1.3)
ERYTHROCYTE [DISTWIDTH] IN BLOOD BY AUTOMATED COUNT: 13 % (ref 11.6–15.1)
GFR SERPL CREATININE-BSD FRML MDRD: 70 ML/MIN/1.73SQ M
GLUCOSE SERPL-MCNC: 89 MG/DL (ref 65–140)
HCT VFR BLD AUTO: 41.4 % (ref 36.5–49.3)
HGB BLD-MCNC: 13.9 G/DL (ref 12–17)
MCH RBC QN AUTO: 30.3 PG (ref 26.8–34.3)
MCHC RBC AUTO-ENTMCNC: 33.6 G/DL (ref 31.4–37.4)
MCV RBC AUTO: 90 FL (ref 82–98)
PLATELET # BLD AUTO: 199 THOUSANDS/UL (ref 149–390)
PMV BLD AUTO: 10 FL (ref 8.9–12.7)
POTASSIUM SERPL-SCNC: 4.2 MMOL/L (ref 3.5–5.3)
RBC # BLD AUTO: 4.59 MILLION/UL (ref 3.88–5.62)
SODIUM SERPL-SCNC: 137 MMOL/L (ref 135–147)
WBC # BLD AUTO: 8.21 THOUSAND/UL (ref 4.31–10.16)

## 2023-01-30 RX ADMIN — IOHEXOL 85 ML: 350 INJECTION, SOLUTION INTRAVENOUS at 18:22

## 2023-01-30 NOTE — ED PROVIDER NOTES
History  Chief Complaint   Patient presents with   • Medical Problem     Patient states last Tuesday, patient experienced blurry vision in his right eye and also had difficulty finding words that day  Patient reports he called his neurologist and they want him to get an mri  Patient states he has no symptoms at this time  56-year-old male presenting with complaint of blurred vision in his left peripheral that occurred 6 days ago  Patient states he would look to the left and when he looked to the left he would see blurry  This lasted intermittently x2 days  Only occurred when looking to the left elbow in the periphery  Did not occur while looking to the right  Symptoms have since resolved  He then experienced 1 episode of difficulty finding his words that occurred the same day  He states he was speaking but had difficulty finding the word region  He was trying to describe that to a friend  His speech was clear but he does not find that word  Talk to his primary care physician today who recommended he be evaluated  Symptoms not occur for several days  Cervical heart was deferred due to symptoms not present for several days  Prior to Admission Medications   Prescriptions Last Dose Informant Patient Reported? Taking?    HYDROcodone-acetaminophen (NORCO) 5-325 mg per tablet   Yes No   Sig: hydrocodone 5 mg-acetaminophen 325 mg tablet   Vitamin D, Cholecalciferol, 25 MCG (1000 UT) CAPS   Yes No   Sig: Take by mouth   Zinc 50 MG CAPS   Yes No   Sig: Take by mouth   albuterol (PROVENTIL HFA,VENTOLIN HFA) 90 mcg/act inhaler   Yes No   Sig: inhale 2 puffs by mouth and INTO THE LUNGS every 4 to 6 hours if needed   finasteride (PROSCAR) 5 mg tablet   No No   Sig: Take 1 tablet (5 mg total) by mouth daily   gabapentin (NEURONTIN) 300 mg capsule   Yes No   Sig: gabapentin 300 mg capsule   ibuprofen (MOTRIN) 800 mg tablet   Yes No   Sig: ibuprofen 800 mg tablet   oxyCODONE (OXY-IR) 5 MG capsule   Yes No Sig: take 1 capsule by mouth twice a day if needed for pain   Patient not taking: Reported on 1/20/2023   tamsulosin (FLOMAX) 0 4 mg   No No   Sig: Take 2 capsules (0 8 mg total) by mouth in the morning      Facility-Administered Medications: None       Past Medical History:   Diagnosis Date   • Spinal cord dysplasia (HCC)        Past Surgical History:   Procedure Laterality Date   • CERVICAL FUSION     • HERNIA REPAIR Left    • NASAL SEPTOPLASTY W/ TURBINOPLASTY  08/22/2003   • TOOTH EXTRACTION         Family History   Problem Relation Age of Onset   • Cancer Father    • Lung cancer Brother    • Cancer Son      I have reviewed and agree with the history as documented  E-Cigarette/Vaping   • E-Cigarette Use Never User      E-Cigarette/Vaping Substances   • Nicotine No    • THC No    • CBD No    • Flavoring No    • Other No    • Unknown No      Social History     Tobacco Use   • Smoking status: Former   • Smokeless tobacco: Never   Vaping Use   • Vaping Use: Never used   Substance Use Topics   • Drug use: Not Currently       Review of Systems   Constitutional: Negative for activity change, appetite change, chills and fever  HENT: Negative for ear pain, hearing loss, rhinorrhea, sneezing, sore throat and trouble swallowing  Eyes: Positive for visual disturbance  Negative for pain  Respiratory: Negative for cough, choking, chest tightness, shortness of breath, wheezing and stridor  Cardiovascular: Negative for chest pain, palpitations and leg swelling  Gastrointestinal: Negative for abdominal pain, constipation, diarrhea, nausea and vomiting  Genitourinary: Negative for difficulty urinating, dysuria, frequency, hematuria and testicular pain  Musculoskeletal: Negative for arthralgias, back pain, gait problem and neck pain  Skin: Negative for color change and rash  Allergic/Immunologic: Negative for immunocompromised state  Neurological: Positive for speech difficulty and headaches   Negative for dizziness, seizures, syncope, weakness, light-headedness and numbness  Psychiatric/Behavioral: Negative for confusion  The patient is not nervous/anxious  All other systems reviewed and are negative  Physical Exam  Physical Exam  Vitals and nursing note reviewed  Constitutional:       General: He is not in acute distress  Appearance: Normal appearance  He is well-developed and normal weight  He is not ill-appearing, toxic-appearing or diaphoretic  HENT:      Head: Normocephalic and atraumatic  Nose: Nose normal       Mouth/Throat:      Mouth: Mucous membranes are moist       Pharynx: Oropharynx is clear  Eyes:      General: No scleral icterus  Extraocular Movements: Extraocular movements intact  Conjunctiva/sclera: Conjunctivae normal    Neck:     Cardiovascular:      Rate and Rhythm: Normal rate and regular rhythm  Pulses: Normal pulses  Heart sounds: Normal heart sounds  No murmur heard  Pulmonary:      Effort: Pulmonary effort is normal  No respiratory distress  Breath sounds: Normal breath sounds  No wheezing or rales  Chest:      Chest wall: No tenderness  Abdominal:      General: Bowel sounds are normal  There is no distension  Palpations: Abdomen is soft  There is no mass  Tenderness: There is no abdominal tenderness  There is no right CVA tenderness, left CVA tenderness, guarding or rebound  Musculoskeletal:         General: No swelling, tenderness or deformity  Normal range of motion  Cervical back: Normal range of motion and neck supple  No tenderness  Right lower leg: No edema  Left lower leg: No edema  Lymphadenopathy:      Cervical: No cervical adenopathy  Skin:     General: Skin is warm and dry  Capillary Refill: Capillary refill takes less than 2 seconds  Findings: No erythema or rash  Neurological:      General: No focal deficit present        Mental Status: He is alert and oriented to person, place, and time  Cranial Nerves: No cranial nerve deficit  Motor: No weakness or abnormal muscle tone  Psychiatric:         Mood and Affect: Mood normal          Behavior: Behavior normal          Thought Content:  Thought content normal          Vital Signs  ED Triage Vitals [01/30/23 1607]   Temperature Pulse Respirations Blood Pressure SpO2   97 6 °F (36 4 °C) 91 20 151/90 98 %      Temp Source Heart Rate Source Patient Position - Orthostatic VS BP Location FiO2 (%)   Temporal Monitor Sitting Right arm --      Pain Score       No Pain           Vitals:    01/30/23 1607   BP: 151/90   Pulse: 91   Patient Position - Orthostatic VS: Sitting         Visual Acuity      ED Medications  Medications   iohexol (OMNIPAQUE) 350 MG/ML injection (SINGLE-DOSE) 85 mL (85 mL Intravenous Given 1/30/23 1822)       Diagnostic Studies  Results Reviewed     Procedure Component Value Units Date/Time    HS Troponin I 4hr [[id number]]     Lab Status: No result Specimen: Blood     HS Troponin 0hr (reflex protocol) [[id number]]  (Normal) Collected: 01/30/23 1734    Lab Status: Final result Specimen: Blood from Arm, Right Updated: 01/30/23 1808     hs TnI 0hr <2 ng/L     HS Troponin I 2hr [[id number]]     Lab Status: No result Specimen: Blood     Basic metabolic panel [[id number]] Collected: 01/30/23 1734    Lab Status: Final result Specimen: Blood from Arm, Right Updated: 01/30/23 1759     Sodium 137 mmol/L      Potassium 4 2 mmol/L      Chloride 102 mmol/L      CO2 28 mmol/L      ANION GAP 7 mmol/L      BUN 16 mg/dL      Creatinine 1 10 mg/dL      Glucose 89 mg/dL      Calcium 9 7 mg/dL      eGFR 70 ml/min/1 73sq m     Narrative:      Teagan guidelines for Chronic Kidney Disease (CKD):   •  Stage 1 with normal or high GFR (GFR > 90 mL/min/1 73 square meters)  •  Stage 2 Mild CKD (GFR = 60-89 mL/min/1 73 square meters)  •  Stage 3A Moderate CKD (GFR = 45-59 mL/min/1 73 square meters)  •  Stage 3B Moderate CKD (GFR = 30-44 mL/min/1 73 square meters)  •  Stage 4 Severe CKD (GFR = 15-29 mL/min/1 73 square meters)  •  Stage 5 End Stage CKD (GFR <15 mL/min/1 73 square meters)  Note: GFR calculation is accurate only with a steady state creatinine    CBC and Platelet [[id number]]  (Normal) Collected: 01/30/23 1734    Lab Status: Final result Specimen: Blood from Arm, Right Updated: 01/30/23 1739     WBC 8 21 Thousand/uL      RBC 4 59 Million/uL      Hemoglobin 13 9 g/dL      Hematocrit 41 4 %      MCV 90 fL      MCH 30 3 pg      MCHC 33 6 g/dL      RDW 13 0 %      Platelets 701 Thousands/uL      MPV 10 0 fL                  CTA head and neck w wo contrast   Final Result by Karel Dixon, DO (01/30 2010)      No acute intracranial abnormality  Tracheomegaly out of proportion sulcal prominence with crowding of sulci at the apex and slight prominence to the sylvian fissures bilaterally  This configuration has been reported in the setting of normal pressure    hydrocephalus which should be considered in the right clinical setting  No significant carotid or vertebral artery stenosis  No focal intracranial stenosis or aneurysm                    Workstation performed: [id number]                    Procedures  ECG 12 Lead Documentation Only    Date/Time: 1/30/2023 6:21 PM  Performed by: Charlie Carreon DO  Authorized by: Charlie Carreon DO     Indications / Diagnosis:  Stroke like symptoms  ECG reviewed by me, the ED Provider: yes    Patient location:  ED  Rate:     ECG rate:  68    ECG rate assessment: normal    Rhythm:     Rhythm: sinus rhythm    Ectopy:     Ectopy: none    QRS:     QRS axis:  Normal    QRS intervals:  Normal  Conduction:     Conduction: normal    ST segments:     ST segments:  Normal  T waves:     T waves: normal               ED Course                  Stroke Assessment     Row Name 01/30/23 1823             NIH Stroke Scale    Interval Baseline      Level of Consciousness (1a ) 0 LOC Questions (1b ) 0      LOC Commands (1c ) 0      Best Gaze (2 ) 0      Visual (3 ) 0      Facial Palsy (4 ) 0      Motor Arm, Left (5a ) 0      Motor Arm, Right (5b ) 0      Motor Leg, Left (6a ) 0      Motor Leg, Right (6b ) 0      Limb Ataxia (7 ) 0      Sensory (8 ) 0      Best Language (9 ) 0      Dysarthria (10 ) 0      Extinction and Inattention (11 ) (Formerly Neglect) 0      Total 0              Flowsheet Row Most Recent Value   Thrombolytic Decision Options    Thrombolytic Decision Patient not a candidate  Patient is not a candidate options Symptoms resolved/clearly non disabling , Unclear time of onset outside appropriate time window  Medical Decision Making  Some evidence on CT suggestive of normal pressure hydrocephalus  Patient denies any episodes of ataxia, off balance or personality changes  Patient does follow with a neurologist and was instructed to follow-up with her neurologist given this condition  He was provided this information on his discharge instructions  Headache: acute illness or injury  Visual changes: self-limited or minor problem  Amount and/or Complexity of Data Reviewed  External Data Reviewed: labs, radiology, ECG and notes  Labs: ordered  Decision-making details documented in ED Course  Radiology: ordered and independent interpretation performed  Decision-making details documented in ED Course  ECG/medicine tests: ordered and independent interpretation performed  Decision-making details documented in ED Course  Risk  OTC drugs  Prescription drug management  Risk Details:  Follow-up with neurologist        Disposition  Final diagnoses:   Headache   Visual changes     Time reflects when diagnosis was documented in both MDM as applicable and the Disposition within this note     Time User Action Codes Description Comment    1/30/2023  8:13 PM Deena TORRES Add [R51 9] Headache     1/30/2023  8:13 PM Ryan Mann Add [H53 9] Visual changes       ED Disposition     ED Disposition   Discharge    Condition   Stable    Date/Time   Mon Jan 30, 2023  8:13 PM    Comment   Janaetoni Barry discharge to home/self care  Follow-up Information     Follow up With Specialties Details Why [address] [facility] Schedule an appointment as soon as possible for a visit   [address] [facility]  [phone]            Discharge Medication List as of 1/30/2023  8:14 PM      CONTINUE these medications which have NOT CHANGED    Details   albuterol (PROVENTIL HFA,VENTOLIN HFA) 90 mcg/act inhaler inhale 2 puffs by mouth and INTO THE LUNGS every 4 to 6 hours if needed, Historical Med      finasteride (PROSCAR) 5 mg tablet Take 1 tablet (5 mg total) by mouth daily, Starting Fri 1/20/2023, Normal      gabapentin (NEURONTIN) 300 mg capsule gabapentin 300 mg capsule, Historical Med      HYDROcodone-acetaminophen (NORCO) 5-325 mg per tablet hydrocodone 5 mg-acetaminophen 325 mg tablet, Historical Med      ibuprofen (MOTRIN) 800 mg tablet ibuprofen 800 mg tablet, Historical Med      oxyCODONE (OXY-IR) 5 MG capsule take 1 capsule by mouth twice a day if needed for pain, Historical Med      tamsulosin (FLOMAX) 0 4 mg Take 2 capsules (0 8 mg total) by mouth in the morning, Starting Mon 8/22/2022, Normal      Vitamin D, Cholecalciferol, 25 MCG (1000 UT) CAPS Take by mouth, Historical Med      Zinc 50 MG CAPS Take by mouth, Historical Med             No discharge procedures on file      PDMP Review     None          ED Provider  Electronically Signed by           Kirsten Murphy DO  01/30/23 2025

## 2023-01-31 LAB
ATRIAL RATE: 68 BPM
P AXIS: 27 DEGREES
PR INTERVAL: 196 MS
QRS AXIS: 4 DEGREES
QRSD INTERVAL: 86 MS
QT INTERVAL: 382 MS
QTC INTERVAL: 406 MS
T WAVE AXIS: 16 DEGREES
VENTRICULAR RATE: 68 BPM

## 2023-01-31 NOTE — DISCHARGE INSTRUCTIONS
Follow-up with your neurologist   There was suggestion of some evidence of a condition called normal pressure hydrocephalus on your CT findings tonight  Please follow-up with your neurologist regarding this condition  Return to the emergency department condition worsens

## 2023-02-06 ENCOUNTER — APPOINTMENT (OUTPATIENT)
Dept: LAB | Facility: HOSPITAL | Age: 63
End: 2023-02-06

## 2023-02-06 DIAGNOSIS — G44.89 OTHER HEADACHE SYNDROME: ICD-10-CM

## 2023-02-06 LAB
CRP SERPL QL: 1.4 MG/L
ERYTHROCYTE [SEDIMENTATION RATE] IN BLOOD: 7 MM/HOUR (ref 0–19)

## 2023-03-03 ENCOUNTER — HOSPITAL ENCOUNTER (OUTPATIENT)
Dept: RADIOLOGY | Facility: HOSPITAL | Age: 63
Discharge: HOME | End: 2023-03-03

## 2023-03-03 DIAGNOSIS — M79.672 LEFT FOOT PAIN: ICD-10-CM

## 2023-03-09 ENCOUNTER — APPOINTMENT (OUTPATIENT)
Dept: LAB | Facility: MEDICAL CENTER | Age: 63
End: 2023-03-09

## 2023-03-09 DIAGNOSIS — M25.50 PAIN IN JOINT, MULTIPLE SITES: ICD-10-CM

## 2023-03-09 DIAGNOSIS — R97.20 ELEVATED PSA: Primary | ICD-10-CM

## 2023-03-09 DIAGNOSIS — L03.90 CELLULITIS OF SKIN WITH LYMPHANGITIS: ICD-10-CM

## 2023-03-09 DIAGNOSIS — R68.83 CHILLS: ICD-10-CM

## 2023-03-09 LAB
ALBUMIN SERPL BCP-MCNC: 4 G/DL (ref 3.5–5)
ALP SERPL-CCNC: 60 U/L (ref 46–116)
ALT SERPL W P-5'-P-CCNC: 47 U/L (ref 12–78)
ANION GAP SERPL CALCULATED.3IONS-SCNC: 6 MMOL/L (ref 4–13)
AST SERPL W P-5'-P-CCNC: 24 U/L (ref 5–45)
BASOPHILS # BLD AUTO: 0.09 THOUSANDS/ÂΜL (ref 0–0.1)
BASOPHILS NFR BLD AUTO: 1 % (ref 0–1)
BILIRUB SERPL-MCNC: 0.44 MG/DL (ref 0.2–1)
BUN SERPL-MCNC: 14 MG/DL (ref 5–25)
CALCIUM SERPL-MCNC: 9.2 MG/DL (ref 8.3–10.1)
CHLORIDE SERPL-SCNC: 102 MMOL/L (ref 96–108)
CO2 SERPL-SCNC: 27 MMOL/L (ref 21–32)
CREAT SERPL-MCNC: 1.29 MG/DL (ref 0.6–1.3)
CRP SERPL QL: 25.1 MG/L
EOSINOPHIL # BLD AUTO: 0.06 THOUSAND/ÂΜL (ref 0–0.61)
EOSINOPHIL NFR BLD AUTO: 1 % (ref 0–6)
ERYTHROCYTE [DISTWIDTH] IN BLOOD BY AUTOMATED COUNT: 13.5 % (ref 11.6–15.1)
GFR SERPL CREATININE-BSD FRML MDRD: 58 ML/MIN/1.73SQ M
GLUCOSE SERPL-MCNC: 110 MG/DL (ref 65–140)
HCT VFR BLD AUTO: 39.7 % (ref 36.5–49.3)
HGB BLD-MCNC: 13.2 G/DL (ref 12–17)
IMM GRANULOCYTES # BLD AUTO: 0.04 THOUSAND/UL (ref 0–0.2)
IMM GRANULOCYTES NFR BLD AUTO: 1 % (ref 0–2)
LYMPHOCYTES # BLD AUTO: 0.76 THOUSANDS/ÂΜL (ref 0.6–4.47)
LYMPHOCYTES NFR BLD AUTO: 10 % (ref 14–44)
MCH RBC QN AUTO: 30.3 PG (ref 26.8–34.3)
MCHC RBC AUTO-ENTMCNC: 33.2 G/DL (ref 31.4–37.4)
MCV RBC AUTO: 91 FL (ref 82–98)
MONOCYTES # BLD AUTO: 0.77 THOUSAND/ÂΜL (ref 0.17–1.22)
MONOCYTES NFR BLD AUTO: 11 % (ref 4–12)
NEUTROPHILS # BLD AUTO: 5.62 THOUSANDS/ÂΜL (ref 1.85–7.62)
NEUTS SEG NFR BLD AUTO: 76 % (ref 43–75)
NRBC BLD AUTO-RTO: 0 /100 WBCS
PLATELET # BLD AUTO: 216 THOUSANDS/UL (ref 149–390)
PMV BLD AUTO: 11.3 FL (ref 8.9–12.7)
POTASSIUM SERPL-SCNC: 4.5 MMOL/L (ref 3.5–5.3)
PROT SERPL-MCNC: 7.4 G/DL (ref 6.4–8.4)
RBC # BLD AUTO: 4.36 MILLION/UL (ref 3.88–5.62)
SODIUM SERPL-SCNC: 135 MMOL/L (ref 135–147)
URATE SERPL-MCNC: 5.3 MG/DL (ref 3.5–8.5)
WBC # BLD AUTO: 7.34 THOUSAND/UL (ref 4.31–10.16)

## 2023-03-10 ENCOUNTER — APPOINTMENT (OUTPATIENT)
Dept: LAB | Facility: MEDICAL CENTER | Age: 63
End: 2023-03-10

## 2023-03-10 DIAGNOSIS — N40.1 HYPERTROPHY OF PROSTATE WITH URINARY RETENTION: ICD-10-CM

## 2023-03-10 DIAGNOSIS — M25.50 PAIN IN JOINT, MULTIPLE SITES: ICD-10-CM

## 2023-03-10 DIAGNOSIS — R33.8 HYPERTROPHY OF PROSTATE WITH URINARY RETENTION: ICD-10-CM

## 2023-03-10 DIAGNOSIS — L03.90 CELLULITIS OF SKIN WITH LYMPHANGITIS: ICD-10-CM

## 2023-03-10 DIAGNOSIS — R68.83 CHILLS: ICD-10-CM

## 2023-03-10 DIAGNOSIS — Z74.1 PERSONAL CARE IMPAIRMENT: ICD-10-CM

## 2023-03-11 LAB — BACTERIA UR CULT: NORMAL

## 2023-03-12 LAB — BACTERIA BLD CULT: NORMAL

## 2023-03-14 ENCOUNTER — HOSPITAL ENCOUNTER (OUTPATIENT)
Dept: RADIOLOGY | Facility: HOSPITAL | Age: 63
Discharge: HOME | End: 2023-03-14

## 2023-03-14 DIAGNOSIS — M79.641 HAND PAIN, RIGHT: ICD-10-CM

## 2023-03-14 LAB — BACTERIA BLD CULT: NORMAL

## 2023-03-15 ENCOUNTER — APPOINTMENT (OUTPATIENT)
Dept: LAB | Facility: MEDICAL CENTER | Age: 63
End: 2023-03-15

## 2023-03-15 DIAGNOSIS — M19.90 ARTHRITIS: ICD-10-CM

## 2023-03-15 DIAGNOSIS — R53.83 OTHER FATIGUE: ICD-10-CM

## 2023-03-15 DIAGNOSIS — M25.50 ARTHRALGIA, UNSPECIFIED JOINT: ICD-10-CM

## 2023-03-15 LAB
ANION GAP SERPL CALCULATED.3IONS-SCNC: 2 MMOL/L (ref 4–13)
BASOPHILS # BLD AUTO: 0.15 THOUSANDS/ÂΜL (ref 0–0.1)
BASOPHILS NFR BLD AUTO: 2 % (ref 0–1)
BUN SERPL-MCNC: 10 MG/DL (ref 5–25)
CALCIUM SERPL-MCNC: 9 MG/DL (ref 8.3–10.1)
CHLORIDE SERPL-SCNC: 107 MMOL/L (ref 96–108)
CO2 SERPL-SCNC: 27 MMOL/L (ref 21–32)
CREAT SERPL-MCNC: 1.01 MG/DL (ref 0.6–1.3)
CRP SERPL QL: 10.5 MG/L
EOSINOPHIL # BLD AUTO: 0.18 THOUSAND/ÂΜL (ref 0–0.61)
EOSINOPHIL NFR BLD AUTO: 2 % (ref 0–6)
ERYTHROCYTE [DISTWIDTH] IN BLOOD BY AUTOMATED COUNT: 13.2 % (ref 11.6–15.1)
GFR SERPL CREATININE-BSD FRML MDRD: 78 ML/MIN/1.73SQ M
GLUCOSE P FAST SERPL-MCNC: 98 MG/DL (ref 65–99)
HCT VFR BLD AUTO: 38.9 % (ref 36.5–49.3)
HGB BLD-MCNC: 12.4 G/DL (ref 12–17)
IMM GRANULOCYTES # BLD AUTO: 0.16 THOUSAND/UL (ref 0–0.2)
IMM GRANULOCYTES NFR BLD AUTO: 2 % (ref 0–2)
LYMPHOCYTES # BLD AUTO: 2.07 THOUSANDS/ÂΜL (ref 0.6–4.47)
LYMPHOCYTES NFR BLD AUTO: 23 % (ref 14–44)
MCH RBC QN AUTO: 29.5 PG (ref 26.8–34.3)
MCHC RBC AUTO-ENTMCNC: 31.9 G/DL (ref 31.4–37.4)
MCV RBC AUTO: 92 FL (ref 82–98)
MONOCYTES # BLD AUTO: 0.58 THOUSAND/ÂΜL (ref 0.17–1.22)
MONOCYTES NFR BLD AUTO: 7 % (ref 4–12)
NEUTROPHILS # BLD AUTO: 5.78 THOUSANDS/ÂΜL (ref 1.85–7.62)
NEUTS SEG NFR BLD AUTO: 64 % (ref 43–75)
NRBC BLD AUTO-RTO: 0 /100 WBCS
PLATELET # BLD AUTO: 283 THOUSANDS/UL (ref 149–390)
PMV BLD AUTO: 10.9 FL (ref 8.9–12.7)
POTASSIUM SERPL-SCNC: 4.5 MMOL/L (ref 3.5–5.3)
RBC # BLD AUTO: 4.21 MILLION/UL (ref 3.88–5.62)
SODIUM SERPL-SCNC: 136 MMOL/L (ref 135–147)
WBC # BLD AUTO: 8.92 THOUSAND/UL (ref 4.31–10.16)

## 2023-06-19 ENCOUNTER — HOSPITAL ENCOUNTER (EMERGENCY)
Facility: HOSPITAL | Age: 63
Discharge: HOME | End: 2023-06-19
Attending: EMERGENCY MEDICINE
Payer: COMMERCIAL

## 2023-06-19 VITALS
WEIGHT: 212.3 LBS | TEMPERATURE: 97.6 F | SYSTOLIC BLOOD PRESSURE: 141 MMHG | RESPIRATION RATE: 12 BRPM | BODY MASS INDEX: 30.46 KG/M2 | OXYGEN SATURATION: 96 % | DIASTOLIC BLOOD PRESSURE: 78 MMHG | HEART RATE: 54 BPM

## 2023-06-19 DIAGNOSIS — R20.0 NUMBNESS AND TINGLING: Primary | ICD-10-CM

## 2023-06-19 DIAGNOSIS — R20.2 NUMBNESS AND TINGLING: Primary | ICD-10-CM

## 2023-06-19 LAB
2HR DELTA HS TROPONIN: <0 NG/L
ANION GAP SERPL CALCULATED.3IONS-SCNC: 10 MMOL/L (ref 4–13)
BASOPHILS # BLD AUTO: 0.05 THOUSANDS/ÂΜL (ref 0–0.1)
BASOPHILS NFR BLD AUTO: 1 % (ref 0–1)
BUN SERPL-MCNC: 12 MG/DL (ref 5–25)
CALCIUM SERPL-MCNC: 9.3 MG/DL (ref 8.4–10.2)
CARDIAC TROPONIN I PNL SERPL HS: 2 NG/L
CARDIAC TROPONIN I PNL SERPL HS: <2 NG/L
CHLORIDE SERPL-SCNC: 102 MMOL/L (ref 96–108)
CO2 SERPL-SCNC: 25 MMOL/L (ref 21–32)
CREAT SERPL-MCNC: 1.13 MG/DL (ref 0.6–1.3)
EOSINOPHIL # BLD AUTO: 0.18 THOUSAND/ÂΜL (ref 0–0.61)
EOSINOPHIL NFR BLD AUTO: 3 % (ref 0–6)
ERYTHROCYTE [DISTWIDTH] IN BLOOD BY AUTOMATED COUNT: 13.1 % (ref 11.6–15.1)
GFR SERPL CREATININE-BSD FRML MDRD: 68 ML/MIN/1.73SQ M
GLUCOSE SERPL-MCNC: 116 MG/DL (ref 65–140)
GLUCOSE SERPL-MCNC: 120 MG/DL (ref 65–140)
HCT VFR BLD AUTO: 39.6 % (ref 36.5–49.3)
HGB BLD-MCNC: 13.3 G/DL (ref 12–17)
IMM GRANULOCYTES # BLD AUTO: 0.02 THOUSAND/UL (ref 0–0.2)
IMM GRANULOCYTES NFR BLD AUTO: 0 % (ref 0–2)
LYMPHOCYTES # BLD AUTO: 1.77 THOUSANDS/ÂΜL (ref 0.6–4.47)
LYMPHOCYTES NFR BLD AUTO: 31 % (ref 14–44)
MAGNESIUM SERPL-MCNC: 1.9 MG/DL (ref 1.9–2.7)
MCH RBC QN AUTO: 30.6 PG (ref 26.8–34.3)
MCHC RBC AUTO-ENTMCNC: 33.6 G/DL (ref 31.4–37.4)
MCV RBC AUTO: 91 FL (ref 82–98)
MONOCYTES # BLD AUTO: 0.49 THOUSAND/ÂΜL (ref 0.17–1.22)
MONOCYTES NFR BLD AUTO: 9 % (ref 4–12)
NEUTROPHILS # BLD AUTO: 3.22 THOUSANDS/ÂΜL (ref 1.85–7.62)
NEUTS SEG NFR BLD AUTO: 56 % (ref 43–75)
NRBC BLD AUTO-RTO: 0 /100 WBCS
PLATELET # BLD AUTO: 186 THOUSANDS/UL (ref 149–390)
PMV BLD AUTO: 10.3 FL (ref 8.9–12.7)
POTASSIUM SERPL-SCNC: 3.9 MMOL/L (ref 3.5–5.3)
RBC # BLD AUTO: 4.35 MILLION/UL (ref 3.88–5.62)
SODIUM SERPL-SCNC: 137 MMOL/L (ref 135–147)
WBC # BLD AUTO: 5.73 THOUSAND/UL (ref 4.31–10.16)

## 2023-06-19 PROCEDURE — 84484 ASSAY OF TROPONIN QUANT: CPT

## 2023-06-19 PROCEDURE — 99284 EMERGENCY DEPT VISIT MOD MDM: CPT

## 2023-06-19 PROCEDURE — 82948 REAGENT STRIP/BLOOD GLUCOSE: CPT

## 2023-06-19 PROCEDURE — 83735 ASSAY OF MAGNESIUM: CPT

## 2023-06-19 PROCEDURE — 80048 BASIC METABOLIC PNL TOTAL CA: CPT

## 2023-06-19 PROCEDURE — 93005 ELECTROCARDIOGRAM TRACING: CPT

## 2023-06-19 PROCEDURE — 85025 COMPLETE CBC W/AUTO DIFF WBC: CPT

## 2023-06-19 PROCEDURE — 36415 COLL VENOUS BLD VENIPUNCTURE: CPT

## 2023-06-19 NOTE — ED PROVIDER NOTES
History  Chief Complaint   Patient presents with   • Numbness     Patient states he was driving about 1 hour ago when he developed left facial numbness and left arm numbness  Patient denies any dizziness, blurred vision or headaches  All symptoms have resolved when he arrived to ED     Goldy Marmolejo is a 20-year-old male with history of prior cervical spine injury with chronic bilateral upper and lower extremity paresthesias, prior history of possible TIA, who presents after episode of left lower facial numbness, left-sided neck numbness, and left chest wall numbness, onset while sleeping in his car in an abnormal position(it woke him up from sleep), spontaneously resolved prior to arrival   Denies headache, vision changes, dizziness, lightheadedness, facial droop, dysarthria, dysphagia, chest pain, shortness of breath, abdominal pain, nausea, vomiting, peripheral edema, unilateral calf pain or swelling, rash, weakness, or difficulty ambulating  States chronic neck pain and bilateral upper and lower extremity paresthesias at baseline  Denies any recent falls or medication changes  Prior to Admission Medications   Prescriptions Last Dose Informant Patient Reported? Taking?    HYDROcodone-acetaminophen (NORCO) 5-325 mg per tablet  Self Yes No   Sig: hydrocodone 5 mg-acetaminophen 325 mg tablet   Vitamin D, Cholecalciferol, 25 MCG (1000 UT) CAPS  Self Yes No   Sig: Take by mouth   Zinc 50 MG CAPS  Self Yes No   Sig: Take by mouth   albuterol (PROVENTIL HFA,VENTOLIN HFA) 90 mcg/act inhaler   Yes No   Sig: inhale 2 puffs by mouth and INTO THE LUNGS every 4 to 6 hours if needed   finasteride (PROSCAR) 5 mg tablet   No No   Sig: Take 1 tablet (5 mg total) by mouth daily   gabapentin (NEURONTIN) 300 mg capsule  Self Yes No   Sig: gabapentin 300 mg capsule   ibuprofen (MOTRIN) 800 mg tablet  Self Yes No   Sig: ibuprofen 800 mg tablet   oxyCODONE (OXY-IR) 5 MG capsule  Self Yes No   Sig: take 1 capsule by mouth twice a day if needed for pain   Patient not taking: Reported on 1/20/2023   tamsulosin (FLOMAX) 0 4 mg  Self No No   Sig: Take 2 capsules (0 8 mg total) by mouth in the morning      Facility-Administered Medications: None       Past Medical History:   Diagnosis Date   • Spinal cord dysplasia (HCC)        Past Surgical History:   Procedure Laterality Date   • CERVICAL FUSION     • HERNIA REPAIR Left    • NASAL SEPTOPLASTY W/ TURBINOPLASTY  08/22/2003   • TOOTH EXTRACTION         Family History   Problem Relation Age of Onset   • Cancer Father    • Lung cancer Brother    • Cancer Son      I have reviewed and agree with the history as documented  E-Cigarette/Vaping   • E-Cigarette Use Never User      E-Cigarette/Vaping Substances   • Nicotine No    • THC No    • CBD No    • Flavoring No    • Other No    • Unknown No      Social History     Tobacco Use   • Smoking status: Former   • Smokeless tobacco: Never   Vaping Use   • Vaping Use: Never used   Substance Use Topics   • Drug use: Not Currently        Review of Systems   Constitutional: Negative  Negative for activity change, appetite change, chills, diaphoresis, fatigue, fever and unexpected weight change  HENT: Negative  Negative for ear pain, hearing loss and tinnitus  Eyes: Negative  Negative for visual disturbance  Respiratory: Negative  Negative for cough and shortness of breath  Cardiovascular: Negative  Negative for chest pain, palpitations and leg swelling  Gastrointestinal: Negative  Negative for abdominal distention, abdominal pain, constipation, diarrhea, nausea and vomiting  Endocrine: Negative  Genitourinary: Negative  Musculoskeletal: Positive for neck pain  Negative for arthralgias, back pain, gait problem, myalgias and neck stiffness  Chronic neck pain, at baseline  Skin: Negative  Allergic/Immunologic: Negative  Neurological: Positive for numbness   Negative for dizziness, tremors, seizures, syncope, facial asymmetry, speech difficulty, weakness, light-headedness and headaches  Endorses episode of left lower facial numbness, left side of neck numbness, and left chest wall numbness after waking up from awkward position in car, has since resolved  Chronic bilateral upper and lower extremity paresthesias, at baseline  Hematological: Negative  Does not bruise/bleed easily  Psychiatric/Behavioral: Negative  Negative for confusion  All other systems reviewed and are negative  Physical Exam  ED Triage Vitals   Temperature Pulse Respirations Blood Pressure SpO2   06/19/23 1630 06/19/23 1630 06/19/23 1630 06/19/23 1630 06/19/23 1630   97 6 °F (36 4 °C) 75 17 (!) 174/94 97 %      Temp Source Heart Rate Source Patient Position - Orthostatic VS BP Location FiO2 (%)   06/19/23 1630 06/19/23 1630 06/19/23 1900 06/19/23 1900 --   Temporal Monitor Lying Left arm       Pain Score       06/19/23 1630       No Pain             Orthostatic Vital Signs  Vitals:    06/19/23 1630 06/19/23 1900   BP: (!) 174/94 141/78   Pulse: 75 (!) 54   Patient Position - Orthostatic VS:  Lying       Physical Exam  Vitals and nursing note reviewed  Exam conducted with a chaperone present  Constitutional:       General: He is not in acute distress  Appearance: Normal appearance  HENT:      Head: Normocephalic and atraumatic  Right Ear: Tympanic membrane and external ear normal       Left Ear: Tympanic membrane and external ear normal       Nose: Nose normal       Mouth/Throat:      Mouth: Mucous membranes are moist       Pharynx: Oropharynx is clear  Eyes:      General: No visual field deficit or scleral icterus  Right eye: No discharge  Left eye: No discharge  Extraocular Movements: Extraocular movements intact  Conjunctiva/sclera: Conjunctivae normal       Pupils: Pupils are equal, round, and reactive to light  Neck:      Comments: No midline tenderness to palpation of cervical spine    Unable to reproduce left-sided facial or chest wall numbness with cervical or thoracic spine movements  Cardiovascular:      Rate and Rhythm: Normal rate and regular rhythm  Pulses: Normal pulses  Heart sounds: Normal heart sounds  Pulmonary:      Effort: Pulmonary effort is normal  No respiratory distress  Breath sounds: Normal breath sounds  Abdominal:      General: Abdomen is flat  Bowel sounds are normal  There is no distension  Palpations: Abdomen is soft  Tenderness: There is no abdominal tenderness  There is no guarding or rebound  Musculoskeletal:         General: No swelling or tenderness  Normal range of motion  Cervical back: Normal range of motion and neck supple  No tenderness  Right lower leg: No edema  Left lower leg: No edema  Lymphadenopathy:      Cervical: No cervical adenopathy  Skin:     General: Skin is warm and dry  Capillary Refill: Capillary refill takes less than 2 seconds  Neurological:      General: No focal deficit present  Mental Status: He is alert and oriented to person, place, and time  GCS: GCS eye subscore is 4  GCS verbal subscore is 5  GCS motor subscore is 6  Cranial Nerves: Cranial nerves 2-12 are intact  No cranial nerve deficit, dysarthria or facial asymmetry  Sensory: Sensation is intact  No sensory deficit  Motor: Motor function is intact  No weakness, tremor, abnormal muscle tone or pronator drift  Coordination: Coordination is intact  Coordination normal  Finger-Nose-Finger Test normal       Gait: Gait is intact   Gait normal    Psychiatric:         Mood and Affect: Mood normal          Behavior: Behavior normal          ED Medications  Medications - No data to display    Diagnostic Studies  Results Reviewed     Procedure Component Value Units Date/Time    HS Troponin I 2hr [[id number]]  (Normal) Collected: 06/19/23 1843    Lab Status: Final result Specimen: Blood from Arm, Left Updated: 06/19/23 1922     hs TnI 2hr <2 ng/L      Delta 2hr hsTnI <0 ng/L     HS Troponin 0hr (reflex protocol) [[id number]]  (Normal) Collected: 06/19/23 1639    Lab Status: Final result Specimen: Blood from Arm, Left Updated: 06/19/23 1719     hs TnI 0hr 2 ng/L     Magnesium [[id number]]  (Normal) Collected: 06/19/23 1639    Lab Status: Final result Specimen: Blood from Arm, Left Updated: 06/19/23 1714     Magnesium 1 9 mg/dL     Basic metabolic panel [[id number]] Collected: 06/19/23 1639    Lab Status: Final result Specimen: Blood from Arm, Left Updated: 06/19/23 1714     Sodium 137 mmol/L      Potassium 3 9 mmol/L      Chloride 102 mmol/L      CO2 25 mmol/L      ANION GAP 10 mmol/L      BUN 12 mg/dL      Creatinine 1 13 mg/dL      Glucose 120 mg/dL      Calcium 9 3 mg/dL      eGFR 68 ml/min/1 73sq m     Narrative:      [place] guidelines for Chronic Kidney Disease (CKD):   •  Stage 1 with normal or high GFR (GFR > 90 mL/min/1 73 square meters)  •  Stage 2 Mild CKD (GFR = 60-89 mL/min/1 73 square meters)  •  Stage 3A Moderate CKD (GFR = 45-59 mL/min/1 73 square meters)  •  Stage 3B Moderate CKD (GFR = 30-44 mL/min/1 73 square meters)  •  Stage 4 Severe CKD (GFR = 15-29 mL/min/1 73 square meters)  •  Stage 5 End Stage CKD (GFR <15 mL/min/1 73 square meters)  Note: GFR calculation is accurate only with a steady state creatinine    CBC and differential [[id number]] Collected: 06/19/23 1639    Lab Status: Final result Specimen: Blood from Arm, Left Updated: 06/19/23 1650     WBC 5 73 Thousand/uL      RBC 4 35 Million/uL      Hemoglobin 13 3 g/dL      Hematocrit 39 6 %      MCV 91 fL      MCH 30 6 pg      MCHC 33 6 g/dL      RDW 13 1 %      MPV 10 3 fL      Platelets 244 Thousands/uL      nRBC 0 /100 WBCs      Neutrophils Relative 56 %      Immat GRANS % 0 %      Lymphocytes Relative 31 %      Monocytes Relative 9 %      Eosinophils Relative 3 %      Basophils Relative 1 %      Neutrophils Absolute 3 22 Thousands/µL Immature Grans Absolute 0 02 Thousand/uL      Lymphocytes Absolute 1 77 Thousands/µL      Monocytes Absolute 0 49 Thousand/µL      Eosinophils Absolute 0 18 Thousand/µL      Basophils Absolute 0 05 Thousands/µL     Fingerstick Glucose (POCT) [[id number]]  (Normal) Collected: 06/19/23 1629    Lab Status: Final result Updated: 06/19/23 1631     POC Glucose 116 mg/dl                  No orders to display         Procedures  ECG 12 Lead Documentation Only    Date/Time: 6/19/2023 2:47 PM    Performed by: Jennifer Lam DO  Authorized by: Jennifer Lam DO    Indications / Diagnosis:  Numbness  ECG reviewed by me, the ED Provider: yes    Patient location:  ED  Interpretation:     Interpretation: normal    Rate:     ECG rate:  65    ECG rate assessment: normal    Rhythm:     Rhythm: sinus rhythm    Ectopy:     Ectopy: none    QRS:     QRS axis:  Normal    QRS intervals:  Normal  Conduction:     Conduction: normal    ST segments:     ST segments:  Normal  T waves:     T waves: normal            ED Course  ED Course as of 06/20/23 1909   Mon Jun 19, 2023   1641 Rickey Mas is a 52-year-old male with history of prior cervical spine injury with chronic bilateral upper extremity numbness, prior TIA, who presents after episode of left lower facial numbness, left-sided chest wall numbness, and left shoulder numbness, onset while sleeping in car, spontaneously resolved within an hour, denies any vision changes, facial droop, slurred speech(speech is at baseline), dysphagia, chest pain, SOB, weakness, or balance issues  Has chronic neck pain, chronic extremity paresthesias at baseline  Neurologic exam nonfocal   Will obtain CBC, BMP to evaluate for electrolyte abnormalities, troponin, and ECG  Given complete resolution of symptoms, stroke alert not indicated, CTA head and neck not indicated    Could represent TIA but this is would be atypical given left chest wall numbness, however very likely could represent pinched nerve from sleeping in car  1715 CBC and differential  Unremarkable  No evidence of anemia or leukocytosis  [address] metabolic panel  Electrolytes and kidney function within normal limits  1722 hs TnI 0hr: 2  In absence of chest pain, ECG changes, shortness of breath, or elevated troponin, doubt ACS, however will repeat in 2 hours given onset of left sided facial and neck numbness within the past 2-3 hours  1922 Patient resting comfortably, in no apparent distress, denies return of symptoms  Dispo pending 2-hour repeat troponin  [place] 51 2hr hsTnI: <0  Doubt ACS  Given onset of symptoms while sleeping in an odd position, likely represents brief neuropathic symptoms in setting of chronic C1/C2 issues  Patient has remained asymptomatic throughout ED course  Will discharge patient and have him follow-up with his neurologist this week for TIA workup if indicated  Strict return precautions discussed  Medical Decision Making  See ED course for MDM  Amount and/or Complexity of Data Reviewed  External Data Reviewed: labs, radiology, ECG and notes  Labs: ordered  Decision-making details documented in ED Course  Radiology: ordered  Decision-making details documented in ED Course  ECG/medicine tests: ordered and independent interpretation performed  Decision-making details documented in ED Course  Disposition  Final diagnoses:   Numbness and tingling     Time reflects when diagnosis was documented in both MDM as applicable and the Disposition within this note     Time User Action Codes Description Comment    6/19/2023  7:25 PM Estela Maki Add [R20 0,  R20 2] Numbness and tingling       ED Disposition     ED Disposition   Discharge    Condition   Stable    Date/Time   Mon Jun 19, 2023  7:25 PM    Comment   Elvira Castillo discharge to home/self care                 Follow-up Information    None         Discharge Medication List as of 6/19/2023  7:26 PM CONTINUE these medications which have NOT CHANGED    Details   albuterol (PROVENTIL HFA,VENTOLIN HFA) 90 mcg/act inhaler inhale 2 puffs by mouth and INTO THE LUNGS every 4 to 6 hours if needed, Historical Med      finasteride (PROSCAR) 5 mg tablet Take 1 tablet (5 mg total) by mouth daily, Starting Fri 1/20/2023, Normal      gabapentin (NEURONTIN) 300 mg capsule gabapentin 300 mg capsule, Historical Med      HYDROcodone-acetaminophen (NORCO) 5-325 mg per tablet hydrocodone 5 mg-acetaminophen 325 mg tablet, Historical Med      ibuprofen (MOTRIN) 800 mg tablet ibuprofen 800 mg tablet, Historical Med      oxyCODONE (OXY-IR) 5 MG capsule take 1 capsule by mouth twice a day if needed for pain, Historical Med      tamsulosin (FLOMAX) 0 4 mg Take 2 capsules (0 8 mg total) by mouth in the morning, Starting Mon 8/22/2022, Normal      Vitamin D, Cholecalciferol, 25 MCG (1000 UT) CAPS Take by mouth, Historical Med      Zinc 50 MG CAPS Take by mouth, Historical Med           No discharge procedures on file  PDMP Review     None           ED Provider  Attending physically available and evaluated Mili Moore  DARY managed the patient along with the ED Attending      Electronically Signed by             Roxie Cintron DO  06/20/23 7596

## 2023-06-19 NOTE — ED ATTENDING ATTESTATION
6/19/2023  ICelsa DO, saw and evaluated the patient  I have discussed the patient with the resident/non-physician practitioner and agree with the resident's/non-physician practitioner's findings, Plan of Care, and MDM as documented in the resident's/non-physician practitioner's note, except where noted  All available labs and Radiology studies were reviewed  I was present for key portions of any procedure(s) performed by the resident/non-physician practitioner and I was immediately available to provide assistance  At this point I agree with the current assessment done in the Emergency Department  I have conducted an independent evaluation of this patient a history and physical is as follows:    63yo presents for resolved episode of numbness to face and chest wall  Patient was sleeping in his car, laying on back when this sensation woke him up  Lasting and resolving over the course of an hour  Denies other associated symptoms, returned to baseline  Patient has history of chronic cervical pain, neuropathy in LUE and LLE, did not experience worsening or changed symptoms in these extremities  A/p: uncertain etiology however patient returned to baseline, no new deficits presently  Symptoms not consistent with CVA, possibly consistent with neuropathy  Will check basic labs and re-assess    Physical Exam  Vitals reviewed  Constitutional:       General: He is not in acute distress  Appearance: Normal appearance  He is not ill-appearing, toxic-appearing or diaphoretic  HENT:      Head: Normocephalic and atraumatic  Right Ear: External ear normal       Left Ear: External ear normal       Nose: Nose normal    Eyes:      General:         Right eye: No discharge  Left eye: No discharge  Extraocular Movements: Extraocular movements intact  Cardiovascular:      Rate and Rhythm: Normal rate and regular rhythm     Pulmonary:      Effort: Pulmonary effort is normal  No respiratory distress  Breath sounds: Normal breath sounds  Musculoskeletal:         General: No deformity or signs of injury  Right lower leg: No edema  Left lower leg: No edema  Skin:     General: Skin is warm  Coloration: Skin is not jaundiced or pale  Neurological:      General: No focal deficit present  Mental Status: He is alert and oriented to person, place, and time  Mental status is at baseline  Cranial Nerves: No cranial nerve deficit        Gait: Gait normal            ED Course         Critical Care Time  Procedures

## 2023-06-20 LAB
ATRIAL RATE: 65 BPM
P AXIS: 48 DEGREES
PR INTERVAL: 202 MS
QRS AXIS: 26 DEGREES
QRSD INTERVAL: 98 MS
QT INTERVAL: 404 MS
QTC INTERVAL: 420 MS
T WAVE AXIS: 50 DEGREES
VENTRICULAR RATE: 65 BPM

## 2023-06-20 PROCEDURE — 93010 ELECTROCARDIOGRAM REPORT: CPT | Performed by: INTERNAL MEDICINE

## 2023-07-18 ENCOUNTER — TELEPHONE (OUTPATIENT)
Dept: UROLOGY | Facility: CLINIC | Age: 63
End: 2023-07-18

## 2023-07-19 ENCOUNTER — APPOINTMENT (OUTPATIENT)
Dept: LAB | Facility: HOSPITAL | Age: 63
End: 2023-07-19
Payer: COMMERCIAL

## 2023-07-19 DIAGNOSIS — M25.50 PAIN IN JOINT, MULTIPLE SITES: ICD-10-CM

## 2023-07-19 DIAGNOSIS — R68.83 CHILLS: ICD-10-CM

## 2023-07-19 DIAGNOSIS — L03.90 CELLULITIS OF SKIN WITH LYMPHANGITIS: ICD-10-CM

## 2023-07-19 DIAGNOSIS — R97.20 ELEVATED PSA: ICD-10-CM

## 2023-07-19 LAB — PSA SERPL-MCNC: 5.06 NG/ML (ref 0–4)

## 2023-07-19 PROCEDURE — 36415 COLL VENOUS BLD VENIPUNCTURE: CPT

## 2023-07-19 PROCEDURE — 84153 ASSAY OF PSA TOTAL: CPT

## 2023-07-24 ENCOUNTER — OFFICE VISIT (OUTPATIENT)
Dept: UROLOGY | Facility: CLINIC | Age: 63
End: 2023-07-24
Payer: COMMERCIAL

## 2023-07-24 VITALS
HEART RATE: 68 BPM | BODY MASS INDEX: 30.42 KG/M2 | SYSTOLIC BLOOD PRESSURE: 140 MMHG | WEIGHT: 212 LBS | DIASTOLIC BLOOD PRESSURE: 80 MMHG

## 2023-07-24 DIAGNOSIS — N40.1 BPH WITH OBSTRUCTION/LOWER URINARY TRACT SYMPTOMS: ICD-10-CM

## 2023-07-24 DIAGNOSIS — Z01.810 PREOPERATIVE CARDIOVASCULAR EXAMINATION: ICD-10-CM

## 2023-07-24 DIAGNOSIS — N13.8 BPH WITH OBSTRUCTION/LOWER URINARY TRACT SYMPTOMS: ICD-10-CM

## 2023-07-24 DIAGNOSIS — R33.9 INCOMPLETE BLADDER EMPTYING: Primary | ICD-10-CM

## 2023-07-24 PROCEDURE — 99214 OFFICE O/P EST MOD 30 MIN: CPT | Performed by: UROLOGY

## 2023-07-24 NOTE — H&P
[address] [facility]  [facility]  [address] [place] [address]  [phone]  [email or web address]      NAME: Alan Bhakta  AGE: 57 y.o. SEX: male  : 1960   MRN: [id number]    DATE: 2023  TIME: 1:13 PM    Assessment and Plan:    BPH with obstruction with severe obstruction, incomplete bladder emptying and worsening symptoms despite excellent medical therapy. I recommended transurethral resection of the prostate. I discussed the operation with him how it is done and risks of bleeding infection impotence incontinence and need for additional procedures. .  We will schedule this. He wishes to proceed with the operation he did have some loss of libido so would be nice to get him off the medications. Chief Complaint   No chief complaint on file. History of Present Illness   58-year-old man last seen by me in 2023-has BPH with obstruction with large median lobe with incomplete bladder emptying. Also has elevated PSA which the BPH is most likely the source. He was started on Proscar and his PSA has dropped to 5.06 as of 2023. PSA was 8.8 2023, and was 15.7 2022, 8.9 August 10, 2022. Therefore it fluctuates a lot. However his symptoms are becoming worse spite the finasteride and the 0.8 mg total of Flomax. He may have lost a little bit of libido with the finasteride.       The following portions of the patient's history were reviewed and updated as appropriate: allergies, current medications, past family history, past medical history, past social history, past surgical history and problem list.  Past Medical History:   Diagnosis Date   • Spinal cord dysplasia ([address] [facility])      Past Surgical History:   Procedure Laterality Date   • CERVICAL FUSION     • HERNIA REPAIR Left    • NASAL SEPTOPLASTY W/ TURBINOPLASTY  2003   • TOOTH EXTRACTION       shoulder  Review of Systems   Review of Systems   Constitutional: Negative for fever. Respiratory: Negative for shortness of breath. Cardiovascular: Negative for chest pain. Genitourinary: Positive for difficulty urinating and frequency. Negative for hematuria. Weaker stream, hesitancy       Active Problem List     Patient Active Problem List   Diagnosis   • Myelopathy (HCC)       Objective   /80   Pulse 68   Wt 96.2 kg (212 lb)   BMI 30.42 kg/m²     Physical Exam  Vitals reviewed. Constitutional:       Appearance: Normal appearance. HENT:      Head: Normocephalic and atraumatic. Eyes:      Extraocular Movements: Extraocular movements intact. Cardiovascular:      Rate and Rhythm: Normal rate and regular rhythm. Pulses: Normal pulses. Pulmonary:      Effort: Pulmonary effort is normal. No respiratory distress. Breath sounds: No stridor. No wheezing. Abdominal:      Palpations: Abdomen is soft. Musculoskeletal:         General: Normal range of motion. Cervical back: Normal range of motion. Skin:     Coloration: Skin is not jaundiced or pale. Neurological:      General: No focal deficit present. Mental Status: He is alert and oriented to person, place, and time. Mental status is at baseline. Psychiatric:         Mood and Affect: Mood normal.         Behavior: Behavior normal.         Thought Content:  Thought content normal.         Judgment: Judgment normal.             Current Medications     Current Outpatient Medications:   •  albuterol (PROVENTIL HFA,VENTOLIN HFA) 90 mcg/act inhaler, inhale 2 puffs by mouth and INTO THE LUNGS every 4 to 6 hours if needed, Disp: , Rfl:   •  finasteride (PROSCAR) 5 mg tablet, Take 1 tablet (5 mg total) by mouth daily, Disp: 90 tablet, Rfl: 3  •  gabapentin (NEURONTIN) 300 mg capsule, gabapentin 300 mg capsule, Disp: , Rfl:   •  HYDROcodone-acetaminophen (NORCO) 5-325 mg per tablet, hydrocodone 5 mg-acetaminophen 325 mg tablet, Disp: , Rfl:   •  ibuprofen (MOTRIN) 800 mg tablet, ibuprofen 800 mg tablet, Disp: , Rfl:   •  tamsulosin (FLOMAX) 0.4 mg, Take 2 capsules (0.8 mg total) by mouth in the morning, Disp: 180 capsule, Rfl: 3  •  Vitamin D, Cholecalciferol, 25 MCG (1000 UT) CAPS, Take by mouth, Disp: , Rfl:   •  Zinc 50 MG CAPS, Take by mouth, Disp: , Rfl:   •  oxyCODONE (OXY-IR) 5 MG capsule, take 1 capsule by mouth twice a day if needed for pain (Patient not taking: Reported on 1/20/2023), Disp: , Rfl:         Jevon Fortune MD

## 2023-07-24 NOTE — PROGRESS NOTES
[address] [facility]  [facility]  [address] [place] [address]  [phone]  [email or web address]      NAME: Benjy Aponte  AGE: 57 y.o. SEX: male  : 1960   MRN: [id number]    DATE: 2023  TIME: 1:13 PM    Assessment and Plan:    BPH with obstruction with severe obstruction, incomplete bladder emptying and worsening symptoms despite excellent medical therapy. I recommended transurethral resection of the prostate. I discussed the operation with him how it is done and risks of bleeding infection impotence incontinence and need for additional procedures. .  We will schedule this. He wishes to proceed with the operation he did have some loss of libido so would be nice to get him off the medications. Chief Complaint   No chief complaint on file. History of Present Illness   68-year-old man last seen by me in 2023-has BPH with obstruction with large median lobe with incomplete bladder emptying. Also has elevated PSA which the BPH is most likely the source. He was started on Proscar and his PSA has dropped to 5.06 as of 2023. PSA was 8.8 2023, and was 15.7 2022, 8.9 August 10, 2022. Therefore it fluctuates a lot. However his symptoms are becoming worse spite the finasteride and the 0.8 mg total of Flomax. He may have lost a little bit of libido with the finasteride.       The following portions of the patient's history were reviewed and updated as appropriate: allergies, current medications, past family history, past medical history, past social history, past surgical history and problem list.  Past Medical History:   Diagnosis Date   • Spinal cord dysplasia ([address] [facility])      Past Surgical History:   Procedure Laterality Date   • CERVICAL FUSION     • HERNIA REPAIR Left    • NASAL SEPTOPLASTY W/ TURBINOPLASTY  2003   • TOOTH EXTRACTION       shoulder  Review of Systems   Review of Systems   Constitutional: Negative for fever. Respiratory: Negative for shortness of breath. Cardiovascular: Negative for chest pain. Genitourinary: Positive for difficulty urinating and frequency. Negative for hematuria. Weaker stream, hesitancy       Active Problem List     Patient Active Problem List   Diagnosis   • Myelopathy (HCC)       Objective   /80   Pulse 68   Wt 96.2 kg (212 lb)   BMI 30.42 kg/m²     Physical Exam  Vitals reviewed. Constitutional:       Appearance: Normal appearance. HENT:      Head: Normocephalic and atraumatic. Eyes:      Extraocular Movements: Extraocular movements intact. Cardiovascular:      Rate and Rhythm: Normal rate and regular rhythm. Pulses: Normal pulses. Pulmonary:      Effort: Pulmonary effort is normal. No respiratory distress. Breath sounds: No stridor. No wheezing. Abdominal:      Palpations: Abdomen is soft. Musculoskeletal:         General: Normal range of motion. Cervical back: Normal range of motion. Skin:     Coloration: Skin is not jaundiced or pale. Neurological:      General: No focal deficit present. Mental Status: He is alert and oriented to person, place, and time. Mental status is at baseline. Psychiatric:         Mood and Affect: Mood normal.         Behavior: Behavior normal.         Thought Content:  Thought content normal.         Judgment: Judgment normal.             Current Medications     Current Outpatient Medications:   •  albuterol (PROVENTIL HFA,VENTOLIN HFA) 90 mcg/act inhaler, inhale 2 puffs by mouth and INTO THE LUNGS every 4 to 6 hours if needed, Disp: , Rfl:   •  finasteride (PROSCAR) 5 mg tablet, Take 1 tablet (5 mg total) by mouth daily, Disp: 90 tablet, Rfl: 3  •  gabapentin (NEURONTIN) 300 mg capsule, gabapentin 300 mg capsule, Disp: , Rfl:   •  HYDROcodone-acetaminophen (NORCO) 5-325 mg per tablet, hydrocodone 5 mg-acetaminophen 325 mg tablet, Disp: , Rfl:   •  ibuprofen (MOTRIN) 800 mg tablet, ibuprofen 800 mg tablet, Disp: , Rfl:   •  tamsulosin (FLOMAX) 0.4 mg, Take 2 capsules (0.8 mg total) by mouth in the morning, Disp: 180 capsule, Rfl: 3  •  Vitamin D, Cholecalciferol, 25 MCG (1000 UT) CAPS, Take by mouth, Disp: , Rfl:   •  Zinc 50 MG CAPS, Take by mouth, Disp: , Rfl:   •  oxyCODONE (OXY-IR) 5 MG capsule, take 1 capsule by mouth twice a day if needed for pain (Patient not taking: Reported on 1/20/2023), Disp: , Rfl:         Skyler Fisher MD

## 2023-07-25 ENCOUNTER — PREP FOR PROCEDURE (OUTPATIENT)
Dept: UROLOGY | Facility: CLINIC | Age: 63
End: 2023-07-25

## 2023-07-25 ENCOUNTER — TELEPHONE (OUTPATIENT)
Dept: UROLOGY | Facility: CLINIC | Age: 63
End: 2023-07-25

## 2023-07-25 DIAGNOSIS — Z01.818 ENCOUNTER FOR PREADMISSION TESTING: Primary | ICD-10-CM

## 2023-07-25 NOTE — TELEPHONE ENCOUNTER
Spoke to pt, offered 8/31 or 9/13 for TURP. Pt opted for 9/13 and questioned how far in advance he would need to notify someone if he chooses to cx or postpone. Instructed pt to call ASAP. Mailed all instructions and post op appt.

## 2023-08-28 DIAGNOSIS — G95.9 MYELOPATHY (HCC): Primary | ICD-10-CM

## 2023-08-28 RX ORDER — OXYCODONE HYDROCHLORIDE 5 MG/1
5 CAPSULE ORAL 2 TIMES DAILY PRN
Qty: 60 CAPSULE | Refills: 0 | Status: SHIPPED | OUTPATIENT
Start: 2023-08-28

## 2023-08-30 ENCOUNTER — APPOINTMENT (OUTPATIENT)
Dept: LAB | Facility: HOSPITAL | Age: 63
End: 2023-08-30
Payer: COMMERCIAL

## 2023-08-30 DIAGNOSIS — N13.8 BPH WITH OBSTRUCTION/LOWER URINARY TRACT SYMPTOMS: ICD-10-CM

## 2023-08-30 DIAGNOSIS — Z01.818 ENCOUNTER FOR PREADMISSION TESTING: ICD-10-CM

## 2023-08-30 DIAGNOSIS — N40.1 BPH WITH OBSTRUCTION/LOWER URINARY TRACT SYMPTOMS: ICD-10-CM

## 2023-08-30 DIAGNOSIS — R33.9 INCOMPLETE BLADDER EMPTYING: ICD-10-CM

## 2023-08-30 LAB
ABO GROUP BLD: NORMAL
BASOPHILS # BLD AUTO: 0.06 THOUSANDS/ÂΜL (ref 0–0.1)
BASOPHILS NFR BLD AUTO: 1 % (ref 0–1)
BILIRUB UR QL STRIP: NEGATIVE
BLD GP AB SCN SERPL QL: NEGATIVE
CLARITY UR: CLEAR
COLOR UR: YELLOW
EOSINOPHIL # BLD AUTO: 0.13 THOUSAND/ÂΜL (ref 0–0.61)
EOSINOPHIL NFR BLD AUTO: 2 % (ref 0–6)
ERYTHROCYTE [DISTWIDTH] IN BLOOD BY AUTOMATED COUNT: 12.5 % (ref 11.6–15.1)
GLUCOSE UR STRIP-MCNC: NEGATIVE MG/DL
HCT VFR BLD AUTO: 39.6 % (ref 36.5–49.3)
HGB BLD-MCNC: 13.4 G/DL (ref 12–17)
HGB UR QL STRIP.AUTO: NEGATIVE
IMM GRANULOCYTES # BLD AUTO: 0.01 THOUSAND/UL (ref 0–0.2)
IMM GRANULOCYTES NFR BLD AUTO: 0 % (ref 0–2)
KETONES UR STRIP-MCNC: NEGATIVE MG/DL
LEUKOCYTE ESTERASE UR QL STRIP: NEGATIVE
LYMPHOCYTES # BLD AUTO: 1.7 THOUSANDS/ÂΜL (ref 0.6–4.47)
LYMPHOCYTES NFR BLD AUTO: 30 % (ref 14–44)
MCH RBC QN AUTO: 31 PG (ref 26.8–34.3)
MCHC RBC AUTO-ENTMCNC: 33.8 G/DL (ref 31.4–37.4)
MCV RBC AUTO: 92 FL (ref 82–98)
MONOCYTES # BLD AUTO: 0.45 THOUSAND/ÂΜL (ref 0.17–1.22)
MONOCYTES NFR BLD AUTO: 8 % (ref 4–12)
NEUTROPHILS # BLD AUTO: 3.35 THOUSANDS/ÂΜL (ref 1.85–7.62)
NEUTS SEG NFR BLD AUTO: 59 % (ref 43–75)
NITRITE UR QL STRIP: NEGATIVE
NRBC BLD AUTO-RTO: 0 /100 WBCS
PH UR STRIP.AUTO: 7 [PH]
PLATELET # BLD AUTO: 214 THOUSANDS/UL (ref 149–390)
PMV BLD AUTO: 10.1 FL (ref 8.9–12.7)
PROT UR STRIP-MCNC: NEGATIVE MG/DL
RBC # BLD AUTO: 4.32 MILLION/UL (ref 3.88–5.62)
RH BLD: POSITIVE
SP GR UR STRIP.AUTO: 1.01 (ref 1–1.03)
SPECIMEN EXPIRATION DATE: NORMAL
UROBILINOGEN UR QL STRIP.AUTO: 0.2 E.U./DL
WBC # BLD AUTO: 5.7 THOUSAND/UL (ref 4.31–10.16)

## 2023-08-30 PROCEDURE — 36415 COLL VENOUS BLD VENIPUNCTURE: CPT

## 2023-08-30 PROCEDURE — 86850 RBC ANTIBODY SCREEN: CPT

## 2023-08-30 PROCEDURE — 87086 URINE CULTURE/COLONY COUNT: CPT

## 2023-08-30 PROCEDURE — 86900 BLOOD TYPING SEROLOGIC ABO: CPT

## 2023-08-30 PROCEDURE — 85025 COMPLETE CBC W/AUTO DIFF WBC: CPT

## 2023-08-30 PROCEDURE — 86901 BLOOD TYPING SEROLOGIC RH(D): CPT

## 2023-08-31 LAB — BACTERIA UR CULT: NORMAL

## 2023-09-05 ENCOUNTER — ANESTHESIA EVENT (OUTPATIENT)
Dept: PERIOP | Facility: HOSPITAL | Age: 63
End: 2023-09-05
Payer: COMMERCIAL

## 2023-09-12 ENCOUNTER — TELEPHONE (OUTPATIENT)
Dept: FAMILY MEDICINE CLINIC | Facility: CLINIC | Age: 63
End: 2023-09-12

## 2023-09-13 ENCOUNTER — ANESTHESIA (OUTPATIENT)
Dept: PERIOP | Facility: HOSPITAL | Age: 63
End: 2023-09-13
Payer: COMMERCIAL

## 2023-09-22 ENCOUNTER — RA CDI HCC (OUTPATIENT)
Dept: OTHER | Facility: HOSPITAL | Age: 63
End: 2023-09-22

## 2023-09-22 NOTE — PROGRESS NOTES
[address] [facility] coding opportunities       Chart reviewed, no opportunity found: [id number]        Patients Insurance     Medicare Insurance: [facility]

## 2023-09-29 ENCOUNTER — OFFICE VISIT (OUTPATIENT)
Dept: FAMILY MEDICINE CLINIC | Facility: CLINIC | Age: 63
End: 2023-09-29
Payer: COMMERCIAL

## 2023-09-29 ENCOUNTER — APPOINTMENT (OUTPATIENT)
Age: 63
End: 2023-09-29
Payer: COMMERCIAL

## 2023-09-29 VITALS
TEMPERATURE: 97.4 F | WEIGHT: 210 LBS | HEART RATE: 68 BPM | HEIGHT: 70 IN | BODY MASS INDEX: 30.06 KG/M2 | SYSTOLIC BLOOD PRESSURE: 130 MMHG | DIASTOLIC BLOOD PRESSURE: 80 MMHG

## 2023-09-29 DIAGNOSIS — M19.90 ARTHRITIS: ICD-10-CM

## 2023-09-29 DIAGNOSIS — Z13.220 ENCOUNTER FOR LIPID SCREENING FOR CARDIOVASCULAR DISEASE: ICD-10-CM

## 2023-09-29 DIAGNOSIS — M79.641 CHRONIC HAND PAIN, RIGHT: ICD-10-CM

## 2023-09-29 DIAGNOSIS — Z23 ENCOUNTER FOR IMMUNIZATION: ICD-10-CM

## 2023-09-29 DIAGNOSIS — Z13.6 ENCOUNTER FOR LIPID SCREENING FOR CARDIOVASCULAR DISEASE: ICD-10-CM

## 2023-09-29 DIAGNOSIS — Z00.00 MEDICARE ANNUAL WELLNESS VISIT, SUBSEQUENT: Primary | ICD-10-CM

## 2023-09-29 DIAGNOSIS — G95.89 MYELOMALACIA OF CERVICAL CORD (HCC): ICD-10-CM

## 2023-09-29 DIAGNOSIS — G89.29 CHRONIC HAND PAIN, RIGHT: ICD-10-CM

## 2023-09-29 LAB
ALBUMIN SERPL BCP-MCNC: 4.2 G/DL (ref 3.5–5)
ALP SERPL-CCNC: 52 U/L (ref 34–104)
ALT SERPL W P-5'-P-CCNC: 24 U/L (ref 7–52)
ANION GAP SERPL CALCULATED.3IONS-SCNC: 9 MMOL/L
AST SERPL W P-5'-P-CCNC: 24 U/L (ref 13–39)
BASOPHILS # BLD AUTO: 0.07 THOUSANDS/ÂΜL (ref 0–0.1)
BASOPHILS NFR BLD AUTO: 2 % (ref 0–1)
BILIRUB SERPL-MCNC: 0.55 MG/DL (ref 0.2–1)
BUN SERPL-MCNC: 14 MG/DL (ref 5–25)
CALCIUM SERPL-MCNC: 9.2 MG/DL (ref 8.4–10.2)
CHLORIDE SERPL-SCNC: 105 MMOL/L (ref 96–108)
CHOLEST SERPL-MCNC: 202 MG/DL
CO2 SERPL-SCNC: 27 MMOL/L (ref 21–32)
CREAT SERPL-MCNC: 1.13 MG/DL (ref 0.6–1.3)
EOSINOPHIL # BLD AUTO: 0.19 THOUSAND/ÂΜL (ref 0–0.61)
EOSINOPHIL NFR BLD AUTO: 4 % (ref 0–6)
ERYTHROCYTE [DISTWIDTH] IN BLOOD BY AUTOMATED COUNT: 12.8 % (ref 11.6–15.1)
GFR SERPL CREATININE-BSD FRML MDRD: 67 ML/MIN/1.73SQ M
GLUCOSE P FAST SERPL-MCNC: 95 MG/DL (ref 65–99)
HCT VFR BLD AUTO: 41.4 % (ref 36.5–49.3)
HDLC SERPL-MCNC: 37 MG/DL
HGB BLD-MCNC: 13.5 G/DL (ref 12–17)
IMM GRANULOCYTES # BLD AUTO: 0.01 THOUSAND/UL (ref 0–0.2)
IMM GRANULOCYTES NFR BLD AUTO: 0 % (ref 0–2)
LDLC SERPL CALC-MCNC: 136 MG/DL (ref 0–100)
LYMPHOCYTES # BLD AUTO: 1.76 THOUSANDS/ÂΜL (ref 0.6–4.47)
LYMPHOCYTES NFR BLD AUTO: 39 % (ref 14–44)
MCH RBC QN AUTO: 30.4 PG (ref 26.8–34.3)
MCHC RBC AUTO-ENTMCNC: 32.6 G/DL (ref 31.4–37.4)
MCV RBC AUTO: 93 FL (ref 82–98)
MONOCYTES # BLD AUTO: 0.43 THOUSAND/ÂΜL (ref 0.17–1.22)
MONOCYTES NFR BLD AUTO: 10 % (ref 4–12)
NEUTROPHILS # BLD AUTO: 2.05 THOUSANDS/ÂΜL (ref 1.85–7.62)
NEUTS SEG NFR BLD AUTO: 45 % (ref 43–75)
NRBC BLD AUTO-RTO: 0 /100 WBCS
PLATELET # BLD AUTO: 201 THOUSANDS/UL (ref 149–390)
PMV BLD AUTO: 11.2 FL (ref 8.9–12.7)
POTASSIUM SERPL-SCNC: 4.5 MMOL/L (ref 3.5–5.3)
PROT SERPL-MCNC: 7 G/DL (ref 6.4–8.4)
RBC # BLD AUTO: 4.44 MILLION/UL (ref 3.88–5.62)
SODIUM SERPL-SCNC: 141 MMOL/L (ref 135–147)
TRIGL SERPL-MCNC: 145 MG/DL
URATE SERPL-MCNC: 6.5 MG/DL (ref 3.5–8.5)
WBC # BLD AUTO: 4.51 THOUSAND/UL (ref 4.31–10.16)

## 2023-09-29 PROCEDURE — 80053 COMPREHEN METABOLIC PANEL: CPT

## 2023-09-29 PROCEDURE — 99213 OFFICE O/P EST LOW 20 MIN: CPT | Performed by: INTERNAL MEDICINE

## 2023-09-29 PROCEDURE — 36415 COLL VENOUS BLD VENIPUNCTURE: CPT

## 2023-09-29 PROCEDURE — 80061 LIPID PANEL: CPT

## 2023-09-29 PROCEDURE — G0439 PPPS, SUBSEQ VISIT: HCPCS | Performed by: INTERNAL MEDICINE

## 2023-09-29 PROCEDURE — 85025 COMPLETE CBC W/AUTO DIFF WBC: CPT

## 2023-09-29 PROCEDURE — 84550 ASSAY OF BLOOD/URIC ACID: CPT

## 2023-09-29 NOTE — PROGRESS NOTES
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 Musculoskeletal and Integument    Arthritis     Generalized arthritis, low back with significant disc disease. History of cervical trauma as well. Relevant Orders    CBC and differential    Comprehensive metabolic panel    Ambulatory Referral to Hand Surgery    Uric acid       Other    Medicare annual wellness visit, subsequent - Primary     Discussed healthy lifestyle, and exercise. Refuses pneumonia shot at this time. Up-to-date on colonoscopies. Encounter for immunization     Patient declines pneumonia shot. Chronic hand pain, right     Now 6 months post injury. He would like to discuss this with a hand specialist.  Referral made to orthopedic hand. X-rays in the past have been negative. Relevant Orders    Ambulatory Referral to Hand Surgery   Other Visit Diagnoses     Encounter for lipid screening for cardiovascular disease        Relevant Orders    Lipid Panel with Direct LDL reflex        BMI Counseling: Body mass index is 30.13 kg/m². The BMI is above normal. Nutrition recommendations include moderation in carbohydrate intake. Exercise recommendations include exercising 3-5 times per week. Rationale for BMI follow-up plan is due to patient being overweight or obese. Depression Screening and Follow-up Plan: Patient was screened for depression during today's encounter. They screened negative with a PHQ-2 score of 0. Preventive health issues were discussed with patient, and age appropriate screening tests were ordered as noted in patient's After Visit Summary. Personalized health advice and appropriate referrals for health education or preventive services given if needed, as noted in patient's After Visit Summary.      History of Present Illness:     Patient presents for a [employer] Visit    Patient is here for his Medicare wellness examination. Declines any vaccines at this time. Has had no recent falls. Reviewed his issues with living will and power of , strongly encouraged him to follow-up on this. He has decreased the number of oxycodone he takes for his neck pain. He has chronic cervical pain related to history of a fractured neck. Currently having discomfort in his right hand, between his index and middle finger. There is now about 10 months old, and he injured it while closing a van door, the third fourth and fifth digits got pushed laterally under force with significant pain, and now persistent pain and some dysfunction. Patient Care Team:  Valentin Maradiaga DO as PCP - General (Internal Medicine)  Javad Frazier MD (Urology)     Review of Systems:     Review of Systems   Constitutional: Negative for chills and fever. HENT: Negative for ear pain, rhinorrhea and sore throat. Eyes: Negative for pain, redness and visual disturbance. Respiratory: Negative for cough and shortness of breath. Cardiovascular: Negative for chest pain and leg swelling. Gastrointestinal: Negative for abdominal pain, diarrhea, nausea and vomiting. Genitourinary: Positive for difficulty urinating. Negative for dysuria, flank pain, frequency and urgency. Musculoskeletal: Positive for arthralgias, back pain and neck pain. Negative for myalgias. Right hand pain between the first and second metacarpals. Skin: Negative for rash. Neurological: Negative for dizziness, weakness, light-headedness and headaches. Hematological: Negative. Psychiatric/Behavioral: Negative for agitation, confusion and suicidal ideas. The patient is not nervous/anxious. All other systems reviewed and are negative.        Problem List:     Patient Active Problem List   Diagnosis   • Myelopathy ([address])   • Medicare annual wellness visit, subsequent   • Encounter for immunization   • Chronic hand pain, right   • Arthritis   • Myelomalacia of cervical cord [facility])      Past Medical and Surgical History:     Past Medical History:   Diagnosis Date   • Spinal cord dysplasia (HCC)      Past Surgical History:   Procedure Laterality Date   • CERVICAL FUSION     • HERNIA REPAIR Left    • NASAL SEPTOPLASTY W/ TURBINOPLASTY  08/22/2003   • TOOTH EXTRACTION        Family History:     Family History   Problem Relation Age of Onset   • Cancer Father    • Lung cancer Brother    • Cancer Son       Social History:     Social History     Socioeconomic History   • Marital status: Single     Spouse name: None   • Number of children: None   • Years of education: None   • Highest education level: None   Occupational History   • None   Tobacco Use   • Smoking status: Former   • Smokeless tobacco: Never   Vaping Use   • Vaping Use: Never used   Substance and Sexual Activity   • Alcohol use: None   • Drug use: Not Currently   • Sexual activity: None   Other Topics Concern   • None   Social History Narrative   • None     Social Determinants of Health     Financial Resource Strain: Low Risk  (9/29/2023)    Overall Financial Resource Strain (CARDIA)    • Difficulty of Paying Living Expenses: Not hard at all   Food Insecurity: Not on file   Transportation Needs: No Transportation Needs (9/29/2023)    PRAPARE - Transportation    • Lack of Transportation (Medical): No    • Lack of Transportation (Non-Medical):  No   Physical Activity: Not on file   Stress: Not on file   Social Connections: Not on file   Intimate Partner Violence: Not on file   Housing Stability: Not on file      Medications and Allergies:     Current Outpatient Medications   Medication Sig Dispense Refill   • finasteride (PROSCAR) 5 mg tablet Take 1 tablet (5 mg total) by mouth daily 90 tablet 3   • ibuprofen (MOTRIN) 800 mg tablet ibuprofen 800 mg tablet     • oxyCODONE (OXY-IR) 5 MG capsule Take 1 capsule (5 mg total) by mouth 2 (two) times a day as needed for moderate pain Max Daily Amount: 10 mg 60 capsule 0   • tamsulosin (FLOMAX) 0.4 mg take 2 capsules by mouth IN THE MORNING 180 capsule 1   • Zinc 50 MG CAPS Take by mouth       No current facility-administered medications for this visit. Allergies   Allergen Reactions   • Acetaminophen Other (See Comments)     Other reaction(s): TONGUE LIPS NOSE TINGLING SENSAT        Immunizations:     Immunization History   Administered Date(s) Administered   • INFLUENZA 10/26/2020      Health Maintenance:         Topic Date Due   • Hepatitis C Screening  Never done   • HIV Screening  Never done   • Colorectal Cancer Screening  Never done         Topic Date Due   • COVID-19 Vaccine (1) Never done   • Pneumococcal Vaccine: 65+ Years (1 - PCV) Never done   • Influenza Vaccine (1) 09/01/2023      Medicare Screening Tests and Risk Assessments:     Last Medicare Wellness visit information reviewed, patient interviewed, no change since last AWV. Health Risk Assessment:   Patient rates overall health as good. Patient feels that their physical health rating is same. Patient is satisfied with their life. Eyesight was rated as same. Hearing was rated as same. Patient feels that their emotional and mental health rating is same. Patients states they are never, rarely angry. Patient states they are sometimes unusually tired/fatigued. Pain experienced in the last 7 days has been some. Patient's pain rating has been 8/10. Depression Screening:   PHQ-2 Score: 0      Fall Risk Screening: In the past year, patient has experienced: no history of falling in past year      Home Safety:  Patient does not have trouble with stairs inside or outside of their home. Patient has working smoke alarms and has working carbon monoxide detector. Home safety hazards include: none. Nutrition:   Current diet is Regular. Medications:   Patient is currently taking over-the-counter supplements.  OTC medications include: see medication list. Patient is able to manage medications. Activities of Daily Living (ADLs)/Instrumental Activities of Daily Living (IADLs):   Walk and transfer into and out of bed and chair?: Yes  Dress and groom yourself?: Yes    Bathe or shower yourself?: Yes    Feed yourself? Yes  Do your laundry/housekeeping?: Yes  Manage your money, pay your bills and track your expenses?: Yes  Make your own meals?: Yes    Do your own shopping?: Yes    Previous Hospitalizations:   Any hospitalizations or ED visits within the last 12 months?: No      Advance Care Planning:   Living will: No    Durable POA for healthcare: No    Advanced directive: Yes    Advanced directive counseling given: Yes    Five wishes given: Yes    Patient declined ACP directive: No    End of Life Decisions reviewed with patient: No    Provider agrees with end of life decisions: No      Cognitive Screening:   Provider or family/friend/caregiver concerned regarding cognition?: No    PREVENTIVE SCREENINGS      Cardiovascular Screening:    General: Screening Current      Diabetes Screening:     General: Screening Current      Colorectal Cancer Screening:     General: Screening Current      Prostate Cancer Screening:    General: Screening Current      Osteoporosis Screening:    General: Screening Not Indicated      Abdominal Aortic Aneurysm (AAA) Screening:    Risk factors include: age between 70-75 yo and tobacco use        General: Screening Not Indicated      Lung Cancer Screening:     General: Screening Not Indicated      Hepatitis C Screening:    General: Screening Not Indicated    Screening, Brief Intervention, and Referral to Treatment (SBIRT)    Screening    Typical number of drinks in a week: 4    Review of Current Opioid Use    Opioid Risk Tool (ORT) Interpretation: Complete Opioid Risk Tool (ORT)    Other Counseling Topics:   Regular weightbearing exercise. No results found.      Physical Exam:     /80 (BP Location: Left arm, Patient Position: Sitting, Cuff Size: Large)   Pulse 68   Temp (!) 97.4 °F (36.3 °C) (Tympanic)   Ht 5' 10" (1.778 m)   Wt 95.3 kg (210 lb)   BMI 30.13 kg/m²     Physical Exam  Vitals and nursing note reviewed. Constitutional:       Appearance: Normal appearance. He is well-developed. HENT:      Nose: Nose normal.      Mouth/Throat:      Pharynx: No oropharyngeal exudate. Eyes:      General: No scleral icterus. Conjunctiva/sclera: Conjunctivae normal.      Pupils: Pupils are equal, round, and reactive to light. Neck:      Vascular: No JVD. Trachea: No tracheal deviation. Cardiovascular:      Rate and Rhythm: Normal rate and regular rhythm. Heart sounds: Normal heart sounds. No murmur heard. Pulmonary:      Effort: Pulmonary effort is normal. No respiratory distress. Breath sounds: Normal breath sounds. No wheezing or rales. Abdominal:      General: Bowel sounds are normal.      Palpations: Abdomen is soft. Tenderness: There is no abdominal tenderness. There is no guarding. Musculoskeletal:         General: Tenderness present. Normal range of motion. Cervical back: Normal range of motion and neck supple. Comments: Decreased range of motion of the cervical spine. Tenderness over the right hand, between the index and middle finger   Skin:     General: Skin is warm and dry. Neurological:      General: No focal deficit present. Mental Status: He is alert and oriented to person, place, and time. Cranial Nerves: No cranial nerve deficit. Sensory: No sensory deficit. Motor: No abnormal muscle tone.       Comments: 5/5 motor, nl sens   Psychiatric:         Mood and Affect: Mood normal.         Behavior: Behavior normal.          Ajit Flash, DO

## 2023-09-29 NOTE — PATIENT INSTRUCTIONS
Medicare Preventive Visit Patient Instructions  Thank you for completing your Welcome to Medicare Visit or Medicare Annual Wellness Visit today. Your next wellness visit will be due in one year (9/29/2024). The screening/preventive services that you may require over the next 5-10 years are detailed below. Some tests may not apply to you based off risk factors and/or age. Screening tests ordered at today's visit but not completed yet may show as past due. Also, please note that scanned in results may not display below. Preventive Screenings:  Service Recommendations Previous Testing/Comments   Colorectal Cancer Screening  · Colonoscopy    · Fecal Occult Blood Test (FOBT)/Fecal Immunochemical Test (FIT)  · Fecal DNA/Cologuard Test  · Flexible Sigmoidoscopy Age: 43-73 years old   Colonoscopy: every 10 years (May be performed more frequently if at higher risk)  OR  FOBT/FIT: every 1 year  OR  Cologuard: every 3 years  OR  Sigmoidoscopy: every 5 years  Screening may be recommended earlier than age 39 if at higher risk for colorectal cancer. Also, an individualized decision between you and your healthcare provider will decide whether screening between the ages of 77-80 would be appropriate.  Colonoscopy: Not on file  FOBT/FIT: Not on file  Cologuard: Not on file  Sigmoidoscopy: Not on file          Prostate Cancer Screening Individualized decision between patient and health care provider in men between ages of 53-66   Medicare will cover every 12 months beginning on the day after your 50th birthday PSA: 5.06 ng/mL     Screening Current     Hepatitis C Screening Once for adults born between 1945 and 1965  More frequently in patients at high risk for Hepatitis C Hep C Antibody: Not on file        Diabetes Screening 1-2 times per year if you're at risk for diabetes or have pre-diabetes Fasting glucose: 98 mg/dL (3/15/2023)  A1C: 5.3 % (4/14/2021)  Screening Current   Cholesterol Screening Once every 5 years if you don't have a lipid disorder. May order more often based on risk factors. Lipid panel: 08/10/2022  Screening Current      Other Preventive Screenings Covered by Medicare:  1. Abdominal Aortic Aneurysm (AAA) Screening: covered once if your at risk. You're considered to be at risk if you have a family history of AAA or a male between the age of 70-76 who smoking at least 100 cigarettes in your lifetime. 2. Lung Cancer Screening: covers low dose CT scan once per year if you meet all of the following conditions: (1) Age 48-67; (2) No signs or symptoms of lung cancer; (3) Current smoker or have quit smoking within the last 15 years; (4) You have a tobacco smoking history of at least 20 pack years (packs per day x number of years you smoked); (5) You get a written order from a healthcare provider. 3. Glaucoma Screening: covered annually if you're considered high risk: (1) You have diabetes OR (2) Family history of glaucoma OR (3)  aged 48 and older OR (3)  American aged 72 and older  3. Osteoporosis Screening: covered every 2 years if you meet one of the following conditions: (1) Have a vertebral abnormality; (2) On glucocorticoid therapy for more than 3 months; (3) Have primary hyperparathyroidism; (4) On osteoporosis medications and need to assess response to drug therapy. 5. HIV Screening: covered annually if you're between the age of 14-79. Also covered annually if you are younger than 13 and older than 72 with risk factors for HIV infection. For pregnant patients, it is covered up to 3 times per pregnancy.     Immunizations:  Immunization Recommendations   Influenza Vaccine Annual influenza vaccination during flu season is recommended for all persons aged >= 6 months who do not have contraindications   Pneumococcal Vaccine   * Pneumococcal conjugate vaccine = PCV13 (Prevnar 13), PCV15 (Vaxneuvance), PCV20 (Prevnar 20)  * Pneumococcal polysaccharide vaccine = PPSV23 (Pneumovax) Adults 19-64 years old: 1-3 doses may be recommended based on certain risk factors  Adults 72 years old: 1-2 doses may be recommended based off what pneumonia vaccine you previously received   Hepatitis B Vaccine 3 dose series if at intermediate or high risk (ex: diabetes, end stage renal disease, liver disease)   Tetanus (Td) Vaccine - COST NOT COVERED BY MEDICARE PART B Following completion of primary series, a booster dose should be given every 10 years to maintain immunity against tetanus. Td may also be given as tetanus wound prophylaxis. Tdap Vaccine - COST NOT COVERED BY MEDICARE PART B Recommended at least once for all adults. For pregnant patients, recommended with each pregnancy. Shingles Vaccine (Shingrix) - COST NOT COVERED BY MEDICARE PART B  2 shot series recommended in those aged 48 and above     Health Maintenance Due:      Topic Date Due   • Hepatitis C Screening  Never done   • HIV Screening  Never done   • Colorectal Cancer Screening  Never done     Immunizations Due:      Topic Date Due   • COVID-19 Vaccine (1) Never done   • Pneumococcal Vaccine: 65+ Years (1 - PCV) Never done   • Influenza Vaccine (1) 09/01/2023     Advance Directives   What are advance directives? Advance directives are legal documents that state your wishes and plans for medical care. These plans are made ahead of time in case you lose your ability to make decisions for yourself. Advance directives can apply to any medical decision, such as the treatments you want, and if you want to donate organs. What are the types of advance directives? There are many types of advance directives, and each state has rules about how to use them. You may choose a combination of any of the following:  · Living will: This is a written record of the treatment you want. You can also choose which treatments you do not want, which to limit, and which to stop at a certain time. This includes surgery, medicine, IV fluid, and tube feedings.    · Durable power of  [facility]): This is a written record that states who you want to make healthcare choices for you when you are unable to make them for yourself. This person, called a proxy, is usually a family member or a friend. You may choose more than 1 proxy. · Do not resuscitate (DNR) order:  A DNR order is used in case your heart stops beating or you stop breathing. It is a request not to have certain forms of treatment, such as CPR. A DNR order may be included in other types of advance directives. · Medical directive: This covers the care that you want if you are in a coma, near death, or unable to make decisions for yourself. You can list the treatments you want for each condition. Treatment may include pain medicine, surgery, blood transfusions, dialysis, IV or tube feedings, and a ventilator (breathing machine). · Values history: This document has questions about your views, beliefs, and how you feel and think about life. This information can help others choose the care that you would choose. Why are advance directives important? An advance directive helps you control your care. Although spoken wishes may be used, it is better to have your wishes written down. Spoken wishes can be misunderstood, or not followed. Treatments may be given even if you do not want them. An advance directive may make it easier for your family to make difficult choices about your care. Weight Management   Why it is important to manage your weight:  Being overweight increases your risk of health conditions such as heart disease, high blood pressure, type 2 diabetes, and certain types of cancer. It can also increase your risk for osteoarthritis, sleep apnea, and other respiratory problems. Aim for a slow, steady weight loss. Even a small amount of weight loss can lower your risk of health problems. How to lose weight safely:  A safe and healthy way to lose weight is to eat fewer calories and get regular exercise. You can lose up about 1 pound a week by decreasing the number of calories you eat by 500 calories each day. Healthy meal plan for weight management:  A healthy meal plan includes a variety of foods, contains fewer calories, and helps you stay healthy. A healthy meal plan includes the following:  · Eat whole-grain foods more often. A healthy meal plan should contain fiber. Fiber is the part of grains, fruits, and vegetables that is not broken down by your body. Whole-grain foods are healthy and provide extra fiber in your diet. Some examples of whole-grain foods are whole-wheat breads and pastas, oatmeal, brown rice, and bulgur. · Eat a variety of vegetables every day. Include dark, leafy greens such as spinach, kale, justin greens, and mustard greens. Eat yellow and orange vegetables such as carrots, sweet potatoes, and winter squash. · Eat a variety of fruits every day. Choose fresh or canned fruit (canned in its own juice or light syrup) instead of juice. Fruit juice has very little or no fiber. · Eat low-fat dairy foods. Drink fat-free (skim) milk or 1% milk. Eat fat-free yogurt and low-fat cottage cheese. Try low-fat cheeses such as mozzarella and other reduced-fat cheeses. · Choose meat and other protein foods that are low in fat. Choose beans or other legumes such as split peas or lentils. Choose fish, skinless poultry (chicken or turkey), or lean cuts of red meat (beef or pork). Before you cook meat or poultry, cut off any visible fat. · Use less fat and oil. Try baking foods instead of frying them. Add less fat, such as margarine, sour cream, regular salad dressing and mayonnaise to foods. Eat fewer high-fat foods. Some examples of high-fat foods include french fries, doughnuts, ice cream, and cakes. · Eat fewer sweets. Limit foods and drinks that are high in sugar. This includes candy, cookies, regular soda, and sweetened drinks.   Exercise:  Exercise at least 30 minutes per day on most days of the week. Some examples of exercise include walking, biking, dancing, and swimming. You can also fit in more physical activity by taking the stairs instead of the elevator or parking farther away from stores. Ask your healthcare provider about the best exercise plan for you. Narcotic (Opioid) Safety    Use narcotics safely:  · Take prescribed narcotics exactly as directed  · Do not give narcotics to others or take narcotics that belong to someone else  · Do not mix narcotics without medicines or alcohol  · Do not drive or operate heavy machinery after you take the narcotic  · Monitor for side effects and notify your healthcare provider if you experienced side effects such as nausea, sleepiness, itching, or trouble thinking clearly. Manage constipation:    Constipation is the most common side effect of narcotic medicine. Constipation is when you have hard, dry bowel movements, or you go longer than usual between bowel movements. Tell your healthcare provider about all changes in your bowel movements while you are taking narcotics. He or she may recommend laxative medicine to help you have a bowel movement. He or she may also change the kind of narcotic you are taking, or change when you take it. The following are more ways you can prevent or relieve constipation:    · Drink liquids as directed. You may need to drink extra liquids to help soften and move your bowels. Ask how much liquid to drink each day and which liquids are best for you. · Eat high-fiber foods. This may help decrease constipation by adding bulk to your bowel movements. High-fiber foods include fruits, vegetables, whole-grain breads and cereals, and beans. Your healthcare provider or dietitian can help you create a high-fiber meal plan. Your provider may also recommend a fiber supplement if you cannot get enough fiber from food. · Exercise regularly. Regular physical activity can help stimulate your intestines.  Walking is a good exercise to prevent or relieve constipation. Ask which exercises are best for you. · Schedule a time each day to have a bowel movement. This may help train your body to have regular bowel movements. Bend forward while you are on the toilet to help move the bowel movement out. Sit on the toilet for at least 10 minutes, even if you do not have a bowel movement. Store narcotics safely:   · Store narcotics where others cannot easily get them. Keep them in a locked cabinet or secure area. Do not  keep them in a purse or other bag you carry with you. A person may be looking for something else and find the narcotics. · Make sure narcotics are stored out of the reach of children. A child can easily overdose on narcotics. Narcotics may look like candy to a small child. The best way to dispose of narcotics: The laws vary by country and area. In the [facility], the best way is to return the narcotics through a take-back program. This program is offered by the [employer] ([employer]). The following are options for using the program:  · Take the narcotics to a [employer] collection site. The site is often a law enforcement center. Call your local law enforcement center for scheduled take-back days in your area. You will be given information on where to go if the collection site is in a different location. · Take the narcotics to an approved pharmacy or hospital.  A pharmacy or hospital may be set up as a collection site. You will need to ask if it is a [employer] collection site if you were not directed there. A pharmacy or doctor's office may not be able to take back narcotics unless it is a [employer] site. · Use a mail-back system. This means you are given containers to put the narcotics into. You will then mail them in the containers. · Use a take-back drop box. This is a place to leave the narcotics at any time. People and animals will not be able to get into the box.  Your local law enforcement agency can tell you where to find a drop box in your area. Other ways to manage pain:   · Ask your healthcare provider about non-narcotic medicines to control pain. Nonprescription medicines include NSAIDs (such as ibuprofen) and acetaminophen. Prescription medicines include muscle relaxers, antidepressants, and steroids. · Pain may be managed without any medicines. Some ways to relieve pain include massage, aromatherapy, or meditation. Physical or occupational therapy may also help. For more information:   · Drug Enforcement Administration  [address] [facility] , [address]  Phone: 9- 888 - 497-4146  Web Address: [employer]iyzico/drug_disposal/    · [address] [facility] [address] and Drug Administration  [address] [facility] , [address]  Phone: 1- 680 - 499-0579  Web Address: http://BluelightApp/     © Copyright [employer] 2018 Information is for End User's use only and may not be sold, redistributed or otherwise used for commercial purposes.  All illustrations and images included in CareNotes® are the copyrighted property of A.D.A.M., Inc. or  [address]

## 2023-09-29 NOTE — ASSESSMENT & PLAN NOTE
Now 6 months post injury. He would like to discuss this with a hand specialist.  Referral made to orthopedic hand. X-rays in the past have been negative.

## 2023-09-29 NOTE — ASSESSMENT & PLAN NOTE
Discussed healthy lifestyle, and exercise. Refuses pneumonia shot at this time. Up-to-date on colonoscopies.

## 2023-10-18 ENCOUNTER — TELEPHONE (OUTPATIENT)
Dept: FAMILY MEDICINE CLINIC | Facility: CLINIC | Age: 63
End: 2023-10-18

## 2023-10-18 NOTE — TELEPHONE ENCOUNTER
Patient called requesting to speak to Dr. Sharifa Cody or Rodo Crandall. States he has some symptoms he has been having that he is curious about. States yesterday his L elbow/arm started burning. States it is now on bilateral arms and feels like a sunburn but no rash present or other symptoms. Patient also reports he periodically experiences food getting stuck in his throat states it does not happen all the time but seems to be happening more. Patient aware you are out of office today and okay to await a call. Patient can be reached back at [phone].

## 2023-10-19 NOTE — TELEPHONE ENCOUNTER
Notify patient, with regards to food getting stuck I believe we need to get him to gastroenterology. With regards to the upper extremity sensations this may be due to his cervical spine injury. May need evaluation again. He would need an appointment for me to get these things rolling, but I could get a referral in for him for GI if he is willing.

## 2023-10-20 ENCOUNTER — OFFICE VISIT (OUTPATIENT)
Dept: FAMILY MEDICINE CLINIC | Facility: CLINIC | Age: 63
End: 2023-10-20

## 2023-10-20 VITALS
WEIGHT: 211.6 LBS | DIASTOLIC BLOOD PRESSURE: 70 MMHG | HEART RATE: 67 BPM | HEIGHT: 70 IN | OXYGEN SATURATION: 97 % | SYSTOLIC BLOOD PRESSURE: 116 MMHG | TEMPERATURE: 97.6 F | BODY MASS INDEX: 30.29 KG/M2

## 2023-10-20 DIAGNOSIS — G95.89 MYELOMALACIA OF CERVICAL CORD (HCC): ICD-10-CM

## 2023-10-20 DIAGNOSIS — R13.19 ESOPHAGEAL DYSPHAGIA: Primary | ICD-10-CM

## 2023-10-20 DIAGNOSIS — G89.29 CHRONIC HAND PAIN, RIGHT: ICD-10-CM

## 2023-10-20 DIAGNOSIS — Z23 ENCOUNTER FOR IMMUNIZATION: ICD-10-CM

## 2023-10-20 DIAGNOSIS — M79.641 CHRONIC HAND PAIN, RIGHT: ICD-10-CM

## 2023-10-20 PROBLEM — Z00.00 MEDICARE ANNUAL WELLNESS VISIT, SUBSEQUENT: Status: RESOLVED | Noted: 2023-09-29 | Resolved: 2023-10-20

## 2023-10-20 NOTE — ASSESSMENT & PLAN NOTE
Referred to GI for further evaluation. This is likely esophageal stricture. This been going on for some time off and on.

## 2023-10-20 NOTE — PROGRESS NOTES
Name: Javi Golden      : 1960      MRN: [id number]  Encounter Provider: Manny Aguayo DO  Encounter Date: 10/20/2023   Encounter department: [address] PRIMARY CARE    Assessment & Plan     1. Esophageal dysphagia  Assessment & Plan:  Referred to GI for further evaluation. This is likely esophageal stricture. This been going on for some time off and on. Orders:  -     Ambulatory Referral to Gastroenterology; Future    2. Chronic hand pain, right  Assessment & Plan:  Patient really wants to talk to a hand surgeon. He was referred but has put it off. He will call and schedule that appointment. Again it is post injury pain, x-rays have been otherwise unremarkable. 3. Myelomalacia of cervical cord [facility])  Assessment & Plan:  Issues with the burning in his arms are likely related to his cervical spine disease. Is currently improving, he has no weakness distally, will hold off on further testing. He will see if it simply goes away on its own. Consider steroid pack, if that is ineffective he may take gabapentin for pain relief but should have probably EMGs of the upper extremities      4. Encounter for immunization  Comments:  Patient refuses flu shot           Subjective      Cachorro Kumar has several complaints today. First and foremost is this inability to swallow, he feels like food is getting stuck. This happens intermittently and depends whether he chews his food well enough or not. No real symptoms of reflux. This is happened more than 2 or 3 times in the past though. That is improved since he called. Second complaint is this sensation of burning down both arms. Feels like he has a sunburn but nothing is there. He notes no lymphadenopathy. No rash. No recent injury. Has a history of chronic neck pain, chronic headaches related to the neck pain, due to a fracture in his past.  He has myelomalacia seen on MRI.     Also complains of his left ankle, injured it recently, feels he might be getting that sensation of the gout-like illness he had before. His uric acid is normal.  May have an element of pseudogout. Had cellulitis associated with that as well. That has ultimately resolved. Review of Systems   Constitutional:  Negative for chills and fever. HENT:  Negative for rhinorrhea and sore throat. Eyes:  Negative for visual disturbance. Respiratory:  Negative for cough and shortness of breath. Cardiovascular:  Negative for chest pain and leg swelling. Gastrointestinal:  Negative for abdominal pain, diarrhea, nausea and vomiting. Food getting stuck when he swallows/ eats, improved now. ... Genitourinary:  Negative for dysuria. Musculoskeletal:  Positive for arthralgias, back pain, neck pain and neck stiffness. Negative for myalgias. Skin:  Negative for rash. Neurological:  Positive for numbness. Negative for dizziness and headaches. Burning sensation in both upper arms without loss of strength   Psychiatric/Behavioral:  Negative for confusion. All other systems reviewed and are negative. Current Outpatient Medications on File Prior to Visit   Medication Sig    finasteride (PROSCAR) 5 mg tablet Take 1 tablet (5 mg total) by mouth daily    ibuprofen (MOTRIN) 800 mg tablet ibuprofen 800 mg tablet    oxyCODONE (OXY-IR) 5 MG capsule Take 1 capsule (5 mg total) by mouth 2 (two) times a day as needed for moderate pain Max Daily Amount: 10 mg    tamsulosin (FLOMAX) 0.4 mg take 2 capsules by mouth IN THE MORNING    Zinc 50 MG CAPS Take by mouth       Objective     /70 (BP Location: Left arm, Patient Position: Sitting, Cuff Size: Adult)   Pulse 67   Temp 97.6 °F (36.4 °C) (Tympanic)   Ht 5' 10" (1.778 m)   Wt 96 kg (211 lb 9.6 oz)   SpO2 97%   BMI 30.36 kg/m²     Physical Exam  Constitutional:       Appearance: Normal appearance. HENT:      Head: Normocephalic and atraumatic. Nose: No congestion or rhinorrhea.    Eyes: Extraocular Movements: Extraocular movements intact. Pupils: Pupils are equal, round, and reactive to light. Neck:      Vascular: No carotid bruit. Cardiovascular:      Rate and Rhythm: Normal rate and regular rhythm. Heart sounds: No murmur heard. Pulmonary:      Effort: No respiratory distress. Breath sounds: No wheezing. Chest:      Chest wall: No tenderness. Abdominal:      General: There is no distension. Tenderness: There is no abdominal tenderness. Hernia: No hernia is present. Musculoskeletal:         General: No swelling or tenderness. Right lower leg: No edema. Left lower leg: No edema. Comments: Chronic neck pain, and now chronic left ankle pain/discomfort. Recent cellulitis/gout. He worries about this coming back, but I see no sign of that. Lymphadenopathy:      Cervical: No cervical adenopathy. Skin:     Findings: No rash. Neurological:      General: No focal deficit present. Mental Status: He is alert and oriented to person, place, and time. Sensory: No sensory deficit. Deep Tendon Reflexes: Reflexes normal.      Comments: Sensory of the upper extremities is completely normal, as is his strength.    Psychiatric:         Mood and Affect: Mood normal.         Behavior: Behavior normal.       Catarina Moon, DO

## 2023-10-20 NOTE — ASSESSMENT & PLAN NOTE
Patient really wants to talk to a hand surgeon. He was referred but has put it off. He will call and schedule that appointment. Again it is post injury pain, x-rays have been otherwise unremarkable.

## 2023-10-20 NOTE — ASSESSMENT & PLAN NOTE
Issues with the burning in his arms are likely related to his cervical spine disease. Is currently improving, he has no weakness distally, will hold off on further testing. He will see if it simply goes away on its own.   Consider steroid pack, if that is ineffective he may take gabapentin for pain relief but should have probably EMGs of the upper extremities

## 2023-10-30 ENCOUNTER — TELEPHONE (OUTPATIENT)
Age: 63
End: 2023-10-30

## 2023-10-30 DIAGNOSIS — N40.0 BENIGN PROSTATIC HYPERPLASIA, UNSPECIFIED WHETHER LOWER URINARY TRACT SYMPTOMS PRESENT: Primary | ICD-10-CM

## 2023-10-30 NOTE — TELEPHONE ENCOUNTER
Patient called stating that last night while voiding his urine was normal, but at the end he noticed blood. He voided again some time after, and noticed a clot. He hasn't seen blood in urine since. Or any issues with urine. And denies any other symptom ie: new onset pain or burning. Pt is concerned and would like to know what he should do?     Call back [phone]

## 2023-10-30 NOTE — TELEPHONE ENCOUNTER
During pre-charting, I noticed pt had outstanding testing due (PSA). Telephone call to pt to remind them to have it done.
Resulted

## 2023-10-31 ENCOUNTER — APPOINTMENT (OUTPATIENT)
Dept: LAB | Facility: HOSPITAL | Age: 63
End: 2023-10-31
Payer: COMMERCIAL

## 2023-10-31 DIAGNOSIS — N40.0 BENIGN PROSTATIC HYPERPLASIA, UNSPECIFIED WHETHER LOWER URINARY TRACT SYMPTOMS PRESENT: ICD-10-CM

## 2023-10-31 LAB
BACTERIA UR QL AUTO: ABNORMAL /HPF
BILIRUB UR QL STRIP: NEGATIVE
CLARITY UR: CLEAR
COLOR UR: YELLOW
GLUCOSE UR STRIP-MCNC: NEGATIVE MG/DL
HGB UR QL STRIP.AUTO: NEGATIVE
KETONES UR STRIP-MCNC: NEGATIVE MG/DL
LEUKOCYTE ESTERASE UR QL STRIP: NEGATIVE
NITRITE UR QL STRIP: NEGATIVE
NON-SQ EPI CELLS URNS QL MICRO: ABNORMAL /HPF
PH UR STRIP.AUTO: 7 [PH]
PROT UR STRIP-MCNC: NEGATIVE MG/DL
RBC #/AREA URNS AUTO: ABNORMAL /HPF
SP GR UR STRIP.AUTO: 1.01 (ref 1–1.03)
UROBILINOGEN UR QL STRIP.AUTO: 0.2 E.U./DL
WBC #/AREA URNS AUTO: ABNORMAL /HPF

## 2023-10-31 PROCEDURE — 87086 URINE CULTURE/COLONY COUNT: CPT

## 2023-10-31 PROCEDURE — 81001 URINALYSIS AUTO W/SCOPE: CPT

## 2023-10-31 NOTE — TELEPHONE ENCOUNTER
Recommend urine testing to rule out infection. Monitoring for worsening hematuria. If passing large cots or experiencing urinary retention recommend ER evaluation.

## 2023-10-31 NOTE — TELEPHONE ENCOUNTER
Pt returned missed call and pt will have UA and UC completed asap.  Pt stated that he just had blood in urine 1 time yesterday and nothing since

## 2023-11-01 ENCOUNTER — CONSULT (OUTPATIENT)
Age: 63
End: 2023-11-01
Payer: COMMERCIAL

## 2023-11-01 ENCOUNTER — TELEPHONE (OUTPATIENT)
Dept: UROLOGY | Facility: CLINIC | Age: 63
End: 2023-11-01

## 2023-11-01 VITALS
TEMPERATURE: 98.1 F | HEART RATE: 70 BPM | BODY MASS INDEX: 30.58 KG/M2 | HEIGHT: 70 IN | WEIGHT: 213.6 LBS | DIASTOLIC BLOOD PRESSURE: 80 MMHG | SYSTOLIC BLOOD PRESSURE: 144 MMHG | OXYGEN SATURATION: 97 %

## 2023-11-01 DIAGNOSIS — K21.9 GASTROESOPHAGEAL REFLUX DISEASE, UNSPECIFIED WHETHER ESOPHAGITIS PRESENT: ICD-10-CM

## 2023-11-01 DIAGNOSIS — R13.19 ESOPHAGEAL DYSPHAGIA: Primary | ICD-10-CM

## 2023-11-01 DIAGNOSIS — Z12.11 SCREENING FOR COLON CANCER: ICD-10-CM

## 2023-11-01 DIAGNOSIS — R10.13 EPIGASTRIC PAIN: ICD-10-CM

## 2023-11-01 LAB — BACTERIA UR CULT: NORMAL

## 2023-11-01 PROCEDURE — 99204 OFFICE O/P NEW MOD 45 MIN: CPT | Performed by: NURSE PRACTITIONER

## 2023-11-01 RX ORDER — FAMOTIDINE 40 MG/1
TABLET, FILM COATED ORAL
Qty: 30 TABLET | Refills: 3 | Status: SHIPPED | OUTPATIENT
Start: 2023-11-01

## 2023-11-01 NOTE — PROGRESS NOTES
[facility] - Outpatient Consultation  Jake Dai 70 y.o. male MRN: [id number]  Encounter: [id number]          ASSESSMENT AND PLAN:    The patient presents today for an initial consultation for his intermittent esophageal dysphagia and reflux symptoms. Exam:  Oral mucosa normal upon visual inspection, without any sores, lesions, or ulcerations. Sclera without icterus and benign. Lung sounds CTA b/l. Normal S1 & S2 upon exam. Abdomen is soft, nondistended, with mild to moderate epigastric tenderness noted upon exam without any guarding or rebound tenderness noted, with faint bowel sounds x4. No edema noted of the b/l lower extremities upon exam today. Skin is non-icteric. 1. Esophageal dysphagia  2. Epigastric pain  3. Gastroesophageal reflux disease, unspecified whether esophagitis present  While the patient was in the office today, I discussed with the patient that with their current symptoms and exam findings, I feel it would be beneficial to proceed with an EGD to further evaluate any underlying etiology that could explain their symptoms, with differential diagnoses that could include but are not limited to; GERD, gastric ulcers, Linton's esophagus, EOE, H. pylori, etc. They were agreeable and verbalized and understanding. I discussed the risks of procedure with the patient including, but not limited to: bleeding, infection, sore throat, and perforation. The patient gave verbal understanding and is agreeable to proceed. With regards to the dysphagia symptoms, I encouraged the patient to eat 4-5 small meals daily, take small bites, cut their food into small pieces, chew thoroughly, and take their time while eating to prevent any choking or aspiration. The patient verbalized an understanding.       In the meantime I would like to start the patient on famotidine 40 mg at bedtime to try to help with some of the acid suppression and improved from the epigastric tenderness until we can proceed with the EGD to get a better understanding of exactly what is causing his symptoms. I did advise the patient that he could still take over-the-counter medications such as Tums, Mylanta, Rolaids, etc. for any breakthrough reflux symptoms. The patient was agreeable and verbalized an understanding. Encouraged the patient to try to avoid trigger foods, including excessive amounts of alcohol for now. - Ambulatory Referral to Gastroenterology  - EGD; Future  - famotidine (PEPCID) 40 MG tablet; Take 1 PO HS. Dispense: 30 tablet; Refill: 3    4. Screening for colon cancer  I discussed with the patient today that since they are not currently experiencing any red flag symptoms, we will hold off on any repeat colonoscopies until the recommended surveillance date unless the patient would start to develop red flag symptoms in the future. The patient was agreeable and verbalized an understanding. DUE: 2026    While the patient was in the office today we did review GI red flag symptoms, including, but not limited to: chronic nausea, vomiting, diarrhea, chills, fever, and unintentional weight loss and should call or contact our office with any changes or concerns. I reviewed with the patient that if they notice any blood while vomiting or in their stool they should contact or office or go to the nearest emergency room for immediate evaluation. The patient was agreeable and verbalized an understanding. The patient will schedule a follow up office visit after his EGD. The patient was agreeable and verbalized an understanding.     ______________________________________________________________________    HPI: The patient is a 70 y.o. male who presents today for a consultation regarding his intermittent esophageal dysphagia and reflux symptoms.   The patient reports that over the past few years he has had several instances of of food getting stuck at the bottom of his sternum and that it can be painful and has to drink until it goes down. He reports that this occurs especially when he eats meat and especially if he does not take his time to chew thoroughly or try to eat too quickly. He also reports that when the incident happened he has worsening reflux during the incident and sometimes for a while afterwards but that in general his reflux is also intermittent. He also reports intermittent/occasional abdominal bloating. The patient denies any nausea, vomiting, decreased appetite, unplanned weight loss, or abdominal pain. Water Intake: 3-4 bottles daily. The patient denies eating any raw or uncooked fish, seafood, or sushi in the past 6-8 months. The patient reports that they have a BM daily and reports that it is mostly relieving, without any consistent diarrhea, nocturnal BMs, constipation, straining, melena or bloody stools. Last BM: Yesterday. Flatus: Yes, normal for him. PMH/PSH:   Abdominal/Chest Surgery: Hernia Repair  Colon Cancer: Denied  Any Cancer: Denied  Pre-Cancerous Polyps: Denied  Crohn's: Denied  Ulcerative Colitis: Denied    Tobacco/Vaping: Denied   ETOH: 6 beers weekly. Marijuana: Denied  Illicit Drug Use: Denied    FH:  Colon Cancer: Denied  Any Cancer: Brother; Lung, Father; Bladder, Son; Neuroblastoma  Family Members with Pre-Cancerous Polyps: Denied  Crohn's: Denied  Ulcerative Colitis: Denied    Meds: Denied  NSAID Use: Intermittent Ibuprofen for pain. Imaging: (None)    Endoscopy History: EGD: (None):     COLONOSCOPY: (1-2 years ago): Dr Katherine Silva or Dr Leatha Avery. Normal according to the patient and is to have repeat in 5 years. DUE: 2026    REVIEW OF SYSTEMS:    CONSTITUTIONAL: Denies any fever, chills, rigors, and weight loss. HEENT: No earache or tinnitus. Denies hearing loss or visual disturbances. CARDIOVASCULAR: No chest pain or palpitations.    RESPIRATORY: Denies any cough, hemoptysis, shortness of breath or dyspnea on exertion. GASTROINTESTINAL: As noted in the History of Present Illness. GENITOURINARY: No problems with urination. Denies any hematuria or dysuria. NEUROLOGIC: No dizziness or vertigo, denies headaches. MUSCULOSKELETAL: Denies any muscle or joint pain. SKIN: Denies skin rashes or itching. ENDOCRINE: Denies excessive thirst. Denies intolerance to heat or cold. PSYCHOSOCIAL: Denies depression or anxiety. Denies any recent memory loss. Historical Information   Past Medical History:   Diagnosis Date    Spinal cord dysplasia ([address])      Past Surgical History:   Procedure Laterality Date    CERVICAL FUSION      HERNIA REPAIR Left     NASAL SEPTOPLASTY W/ TURBINOPLASTY  08/22/2003    TOOTH EXTRACTION       Social History   Social History     Substance and Sexual Activity   Alcohol Use Yes    Comment: occasionally     Social History     Substance and Sexual Activity   Drug Use Not Currently     Social History     Tobacco Use   Smoking Status Former    Passive exposure: Past   Smokeless Tobacco Never     Family History   Problem Relation Age of Onset    Cancer Father     Lung cancer Brother     Cancer Son        Meds/Allergies       Current Outpatient Medications:     finasteride (PROSCAR) 5 mg tablet    ibuprofen (MOTRIN) 800 mg tablet    oxyCODONE (OXY-IR) 5 MG capsule    tamsulosin (FLOMAX) 0.4 mg    Zinc 50 MG CAPS    Allergies   Allergen Reactions    Acetaminophen Other (See Comments)     Other reaction(s): TONGUE LIPS NOSE TINGLING SENSAT             Objective     There were no vitals taken for this visit. There is no height or weight on file to calculate BMI. PHYSICAL EXAM:      General Appearance:   Alert, cooperative, no distress   HEENT:   Normocephalic, atraumatic, anicteric. Neck:  Supple, symmetrical, trachea midline   Lungs:   Clear to auscultation bilaterally; no rales, rhonchi or wheezing; respirations unlabored    Heart[de-identified]   Regular rate and rhythm; no murmur, rub, or gallop. Abdomen:   Soft, non-tender, non-distended; normal bowel sounds; no masses, no organomegaly    Genitalia:   Deferred    Rectal:   Deferred    Extremities:  No cyanosis, clubbing or edema    Pulses:  2+ and symmetric    Skin:  No jaundice, rashes, or lesions    Lymph nodes:  No palpable cervical lymphadenopathy        Lab Results:   No visits with results within 1 Day(s) from this visit. Latest known visit with results is:   Telephone on 10/30/2023   Component Date Value    Color, UA 10/31/2023 Yellow     Clarity, UA 10/31/2023 Clear     Specific Gravity, UA 10/31/2023 1.015     pH, UA 10/31/2023 7.0     Leukocytes, UA 10/31/2023 Negative     Nitrite, UA 10/31/2023 Negative     Protein, UA 10/31/2023 Negative     Glucose, UA 10/31/2023 Negative     Ketones, UA 10/31/2023 Negative     Urobilinogen, UA 10/31/2023 0.2     Bilirubin, UA 10/31/2023 Negative     Occult Blood, UA 10/31/2023 Negative     RBC, UA 10/31/2023 0-1 (A)     WBC, UA 10/31/2023 0-1 (A)     Epithelial Cells 10/31/2023 Occasional     Bacteria, UA 10/31/2023 Occasional          Radiology Results:   No results found.

## 2023-11-01 NOTE — PATIENT INSTRUCTIONS
Start famotidine 40 mg daily at bed time. Proceed with EGD. Schedule a f/u OV after EGD. With regards to the dysphagia symptoms, I encouraged the patient to eat 4-5 small meals daily, take small bites, cut their food into small pieces, chew thoroughly, and take their time while eating to prevent any choking or aspiration. The patient verbalized an understanding.

## 2023-11-01 NOTE — RESULT ENCOUNTER NOTE
Please let patient know that his urine testing is negative for infection. This is also negative for true microscopic hematuria. He should continue to monitor for persistent hematuria and call with any bothersome LUTS.

## 2023-11-01 NOTE — TELEPHONE ENCOUNTER
----- Message from [address] sent at 11/1/2023  8:24 AM EDT -----  Please let patient know that his urine testing is negative for infection. This is also negative for true microscopic hematuria. He should continue to monitor for persistent hematuria and call with any bothersome LUTS.

## 2023-11-21 ENCOUNTER — TELEPHONE (OUTPATIENT)
Age: 63
End: 2023-11-21

## 2023-11-21 NOTE — TELEPHONE ENCOUNTER
Returned call to Erendira Ortiz to discuss his questions regarding TURP scheduled for tomorrow. Patient with multiple questions regarding procedure itself, side effects, post op restrictions, and Talavera care. Reviewed all of the above with patient. Patient states he was looking up things online regarding Rezum- advised this is not currently offered at our office. After reviewing over everything patient plans on proceeding as scheduled at this time. Will discuss any additional questions tomorrow morning with Dr. Zoraida Milton.

## 2023-11-21 NOTE — TELEPHONE ENCOUNTER
Pt called and requesting call back from provider regarding procedure that is scheduled for tomorrow.     Pt call [phone]

## 2023-11-22 ENCOUNTER — HOSPITAL ENCOUNTER (OUTPATIENT)
Facility: HOSPITAL | Age: 63
Setting detail: OUTPATIENT SURGERY
Discharge: HOME | End: 2023-11-22
Attending: UROLOGY | Admitting: UROLOGY
Payer: COMMERCIAL

## 2023-11-22 VITALS
TEMPERATURE: 97.4 F | HEIGHT: 70 IN | DIASTOLIC BLOOD PRESSURE: 79 MMHG | RESPIRATION RATE: 20 BRPM | HEART RATE: 58 BPM | WEIGHT: 212 LBS | SYSTOLIC BLOOD PRESSURE: 130 MMHG | BODY MASS INDEX: 30.35 KG/M2 | OXYGEN SATURATION: 98 %

## 2023-11-22 DIAGNOSIS — Z01.810 PREOPERATIVE CARDIOVASCULAR EXAMINATION: ICD-10-CM

## 2023-11-22 DIAGNOSIS — N13.8 BPH WITH OBSTRUCTION/LOWER URINARY TRACT SYMPTOMS: ICD-10-CM

## 2023-11-22 DIAGNOSIS — R33.9 INCOMPLETE BLADDER EMPTYING: ICD-10-CM

## 2023-11-22 DIAGNOSIS — N40.1 BPH WITH OBSTRUCTION/LOWER URINARY TRACT SYMPTOMS: ICD-10-CM

## 2023-11-22 PROCEDURE — 88342 IMHCHEM/IMCYTCHM 1ST ANTB: CPT | Performed by: PATHOLOGY

## 2023-11-22 PROCEDURE — NC001 PR NO CHARGE: Performed by: UROLOGY

## 2023-11-22 PROCEDURE — 88344 IMHCHEM/IMCYTCHM EA MLT ANTB: CPT | Performed by: PATHOLOGY

## 2023-11-22 PROCEDURE — 76942 ECHO GUIDE FOR BIOPSY: CPT | Performed by: UROLOGY

## 2023-11-22 PROCEDURE — 55700 PR PROSTATE NEEDLE BIOPSY ANY APPROACH: CPT | Performed by: UROLOGY

## 2023-11-22 PROCEDURE — G0416 PROSTATE BIOPSY, ANY MTHD: HCPCS | Performed by: PATHOLOGY

## 2023-11-22 RX ORDER — MIDAZOLAM HYDROCHLORIDE 2 MG/2ML
INJECTION, SOLUTION INTRAMUSCULAR; INTRAVENOUS AS NEEDED
Status: DISCONTINUED | OUTPATIENT
Start: 2023-11-22 | End: 2023-11-22

## 2023-11-22 RX ORDER — MAGNESIUM HYDROXIDE 1200 MG/15ML
LIQUID ORAL AS NEEDED
Status: DISCONTINUED | OUTPATIENT
Start: 2023-11-22 | End: 2023-11-22 | Stop reason: HOSPADM

## 2023-11-22 RX ORDER — SODIUM CHLORIDE, SODIUM LACTATE, POTASSIUM CHLORIDE, CALCIUM CHLORIDE 600; 310; 30; 20 MG/100ML; MG/100ML; MG/100ML; MG/100ML
125 INJECTION, SOLUTION INTRAVENOUS CONTINUOUS
Status: DISCONTINUED | OUTPATIENT
Start: 2023-11-22 | End: 2023-11-22 | Stop reason: HOSPADM

## 2023-11-22 RX ORDER — OXYCODONE HYDROCHLORIDE 5 MG/1
5 TABLET ORAL EVERY 4 HOURS PRN
Status: DISCONTINUED | OUTPATIENT
Start: 2023-11-22 | End: 2023-11-22 | Stop reason: HOSPADM

## 2023-11-22 RX ORDER — GABAPENTIN 300 MG/1
300 CAPSULE ORAL 3 TIMES DAILY PRN
COMMUNITY

## 2023-11-22 RX ORDER — ALBUTEROL SULFATE 2.5 MG/3ML
2.5 SOLUTION RESPIRATORY (INHALATION) ONCE AS NEEDED
Status: DISCONTINUED | OUTPATIENT
Start: 2023-11-22 | End: 2023-11-22 | Stop reason: HOSPADM

## 2023-11-22 RX ORDER — FENTANYL CITRATE/PF 50 MCG/ML
25 SYRINGE (ML) INJECTION
Status: DISCONTINUED | OUTPATIENT
Start: 2023-11-22 | End: 2023-11-22 | Stop reason: HOSPADM

## 2023-11-22 RX ORDER — LIDOCAINE HYDROCHLORIDE 10 MG/ML
INJECTION, SOLUTION EPIDURAL; INFILTRATION; INTRACAUDAL; PERINEURAL AS NEEDED
Status: DISCONTINUED | OUTPATIENT
Start: 2023-11-22 | End: 2023-11-22

## 2023-11-22 RX ORDER — LIDOCAINE HYDROCHLORIDE 20 MG/ML
INJECTION, SOLUTION EPIDURAL; INFILTRATION; INTRACAUDAL; PERINEURAL AS NEEDED
Status: DISCONTINUED | OUTPATIENT
Start: 2023-11-22 | End: 2023-11-22 | Stop reason: HOSPADM

## 2023-11-22 RX ORDER — FENTANYL CITRATE 50 UG/ML
INJECTION, SOLUTION INTRAMUSCULAR; INTRAVENOUS AS NEEDED
Status: DISCONTINUED | OUTPATIENT
Start: 2023-11-22 | End: 2023-11-22

## 2023-11-22 RX ORDER — GLYCOPYRROLATE 0.2 MG/ML
INJECTION INTRAMUSCULAR; INTRAVENOUS AS NEEDED
Status: DISCONTINUED | OUTPATIENT
Start: 2023-11-22 | End: 2023-11-22

## 2023-11-22 RX ORDER — PROPOFOL 10 MG/ML
INJECTION, EMULSION INTRAVENOUS CONTINUOUS PRN
Status: DISCONTINUED | OUTPATIENT
Start: 2023-11-22 | End: 2023-11-22

## 2023-11-22 RX ORDER — ONDANSETRON 2 MG/ML
INJECTION INTRAMUSCULAR; INTRAVENOUS AS NEEDED
Status: DISCONTINUED | OUTPATIENT
Start: 2023-11-22 | End: 2023-11-22

## 2023-11-22 RX ORDER — MELATONIN
1000 DAILY
COMMUNITY

## 2023-11-22 RX ORDER — CEFAZOLIN SODIUM 2 G/50ML
2000 SOLUTION INTRAVENOUS ONCE
Status: COMPLETED | OUTPATIENT
Start: 2023-11-22 | End: 2023-11-22

## 2023-11-22 RX ORDER — ONDANSETRON 2 MG/ML
4 INJECTION INTRAMUSCULAR; INTRAVENOUS ONCE AS NEEDED
Status: DISCONTINUED | OUTPATIENT
Start: 2023-11-22 | End: 2023-11-22 | Stop reason: HOSPADM

## 2023-11-22 RX ADMIN — GLYCOPYRROLATE 0.2 MG: 0.2 INJECTION, SOLUTION INTRAMUSCULAR; INTRAVENOUS at 14:01

## 2023-11-22 RX ADMIN — PROPOFOL 30 MG: 10 INJECTION, EMULSION INTRAVENOUS at 14:06

## 2023-11-22 RX ADMIN — OXYCODONE HYDROCHLORIDE 5 MG: 5 TABLET ORAL at 15:19

## 2023-11-22 RX ADMIN — PROPOFOL 120 MCG/KG/MIN: 10 INJECTION, EMULSION INTRAVENOUS at 14:07

## 2023-11-22 RX ADMIN — FENTANYL CITRATE 50 MCG: 50 INJECTION, SOLUTION INTRAMUSCULAR; INTRAVENOUS at 14:06

## 2023-11-22 RX ADMIN — CEFAZOLIN SODIUM 2000 MG: 2 SOLUTION INTRAVENOUS at 14:01

## 2023-11-22 RX ADMIN — MIDAZOLAM 2 MG: 1 INJECTION INTRAMUSCULAR; INTRAVENOUS at 14:01

## 2023-11-22 RX ADMIN — LIDOCAINE HYDROCHLORIDE 25 MG: 10 INJECTION, SOLUTION EPIDURAL; INFILTRATION; INTRACAUDAL; PERINEURAL at 14:06

## 2023-11-22 RX ADMIN — ONDANSETRON 4 MG: 2 INJECTION INTRAMUSCULAR; INTRAVENOUS at 14:11

## 2023-11-22 RX ADMIN — SODIUM CHLORIDE, SODIUM LACTATE, POTASSIUM CHLORIDE, AND CALCIUM CHLORIDE 125 ML/HR: .6; .31; .03; .02 INJECTION, SOLUTION INTRAVENOUS at 12:14

## 2023-11-22 NOTE — ANESTHESIA PREPROCEDURE EVALUATION
Procedure:  TRANSURETHRAL RESECTION OF PROSTATE (TURP) (Bladder)  TRANSPERINEAL ULTRASOUND GUIDED BIOPSY PROSTATE (Perineum)    Relevant Problems   GI/HEPATIC   (+) Esophageal dysphagia   (+) Gastroesophageal reflux disease      MUSCULOSKELETAL   (+) Arthritis      NEURO/PSYCH   (+) Chronic hand pain, right        Physical Exam    Airway    Mallampati score: II  TM Distance: >3 FB  Neck ROM: full     Dental   No notable dental hx     Cardiovascular      Pulmonary      Other Findings        Anesthesia Plan  ASA Score- 2     Anesthesia Type- IV sedation with anesthesia with ASA Monitors. Additional Monitors:     Airway Plan:            Plan Factors-Exercise tolerance (METS): >4 METS. Chart reviewed. Patient summary reviewed. Patient is not a current smoker. Obstructive sleep apnea risk education given perioperatively. Induction- intravenous. Postoperative Plan-     Informed Consent- Anesthetic plan and risks discussed with patient. I personally reviewed this patient with the CRNA. Discussed and agreed on the Anesthesia Plan with the CRNA. Daina Dent

## 2023-11-22 NOTE — H&P
Chicho Mccray MD  Physician  Specialty: Urology     H&P    Update- exam repeated, no change in health, ROS done. Pt does not wish to have TURP done, but I offered him biopsy while he's here- he is anxious and would like to think about prostate procedures done. He will proceed w/ bx. The procedure as well as risks of bleeding, infection, retention were explained and he gives informed consent- transperineal prostate bx for elevated PSA. Signed     Encounter Date: 2023   Related encounter: Office Visit from 2023 in [facility] [address] [facility]  [address] [facility] [address] [address]  [phone]  [email or web address]        NAME: Bruce Kaur  AGE: 57 y.o. SEX: male  : 1960            MRN: [id number]     DATE: 2023  TIME: 1:13 PM     Assessment and Plan:     BPH with obstruction with severe obstruction, incomplete bladder emptying and worsening symptoms despite excellent medical therapy. I recommended transurethral resection of the prostate. I discussed the operation with him how it is done and risks of bleeding infection impotence incontinence and need for additional procedures. .  We will schedule this. He wishes to proceed with the operation he did have some loss of libido so would be nice to get him off the medications. Chief Complaint   No chief complaint on file. History of Present Illness   57-year-old man last seen by me in 2023-has BPH with obstruction with large median lobe with incomplete bladder emptying. Also has elevated PSA which the BPH is most likely the source. He was started on Proscar and his PSA has dropped to 5.06 as of 2023. PSA was 8.8 2023, and was 15.7 2022, 8.9 August 10, 2022. Therefore it fluctuates a lot.   However his symptoms are becoming worse spite the finasteride and the 0.8 mg total of Flomax. He may have lost a little bit of libido with the finasteride. The following portions of the patient's history were reviewed and updated as appropriate: allergies, current medications, past family history, past medical history, past social history, past surgical history and problem list.       Past Medical History:   Diagnosis Date    Spinal cord dysplasia ([address])              Past Surgical History:   Procedure Laterality Date    CERVICAL FUSION        HERNIA REPAIR Left      NASAL SEPTOPLASTY W/ TURBINOPLASTY   08/22/2003    TOOTH EXTRACTION          shoulder  Review of Systems   Review of Systems   Constitutional: Negative for fever. Respiratory: Negative for shortness of breath. Cardiovascular: Negative for chest pain. Genitourinary: Positive for difficulty urinating and frequency. Negative for hematuria. Weaker stream, hesitancy         Active Problem List          Patient Active Problem List   Diagnosis    Myelopathy (HCC)         Objective   /80   Pulse 68   Wt 96.2 kg (212 lb)   BMI 30.42 kg/m²      Physical Exam  Vitals reviewed. Constitutional:       Appearance: Normal appearance. HENT:      Head: Normocephalic and atraumatic. Eyes:      Extraocular Movements: Extraocular movements intact. Cardiovascular:      Rate and Rhythm: Normal rate and regular rhythm. Pulses: Normal pulses. Pulmonary:      Effort: Pulmonary effort is normal. No respiratory distress. Breath sounds: No stridor. No wheezing. Abdominal:      Palpations: Abdomen is soft. Musculoskeletal:         General: Normal range of motion. Cervical back: Normal range of motion. Skin:     Coloration: Skin is not jaundiced or pale. Neurological:      General: No focal deficit present. Mental Status: He is alert and oriented to person, place, and time. Mental status is at baseline.    Psychiatric:         Mood and Affect: Mood normal.         Behavior: Behavior normal.         Thought Content:  Thought content normal.         Judgment: Judgment normal.                  Current Medications      Current Outpatient Medications:     albuterol (PROVENTIL HFA,VENTOLIN HFA) 90 mcg/act inhaler, inhale 2 puffs by mouth and INTO THE LUNGS every 4 to 6 hours if needed, Disp: , Rfl:     finasteride (PROSCAR) 5 mg tablet, Take 1 tablet (5 mg total) by mouth daily, Disp: 90 tablet, Rfl: 3    gabapentin (NEURONTIN) 300 mg capsule, gabapentin 300 mg capsule, Disp: , Rfl:     HYDROcodone-acetaminophen (NORCO) 5-325 mg per tablet, hydrocodone 5 mg-acetaminophen 325 mg tablet, Disp: , Rfl:     ibuprofen (MOTRIN) 800 mg tablet, ibuprofen 800 mg tablet, Disp: , Rfl:     tamsulosin (FLOMAX) 0.4 mg, Take 2 capsules (0.8 mg total) by mouth in the morning, Disp: 180 capsule, Rfl: 3    Vitamin D, Cholecalciferol, 25 MCG (1000 UT) CAPS, Take by mouth, Disp: , Rfl:     Zinc 50 MG CAPS, Take by mouth, Disp: , Rfl:     oxyCODONE (OXY-IR) 5 MG capsule, take 1 capsule by mouth twice a day if needed for pain (Patient not taking: Reported on 1/20/2023), Disp: , Rfl:            Tamela Serrano MD

## 2023-11-22 NOTE — ANESTHESIA POSTPROCEDURE EVALUATION
Post-Op Assessment Note    CV Status:  Stable  Pain Score: 0    Pain management: adequate       Mental Status:  Alert and awake   Hydration Status:  Euvolemic   PONV Controlled:  Controlled   Airway Patency:  Patent     Post Op Vitals Reviewed: Yes    No anethesia notable event occurred.     Staff: CRNA               BP   116/72   Temp   97.2   Pulse  60   Resp   18   SpO2   100

## 2023-11-22 NOTE — OP NOTE
OPERATIVE REPORT  PATIENT NAME: Clifton Mccray    :  1960  MRN: [id number]  Pt Location: [facility] OR ROOM 01    SURGERY DATE: 2023    Surgeon(s) and Role:     * Aidan Campos MD - Primary    Preop Diagnosis:  Elevated PSA    Postoperative diagnosis:  Elevated PSA    Procedure(s):  TRANSPERINEAL ULTRASOUND GUIDED BIOPSY PROSTATE-saturation    Specimen(s):    Right and left anterior medial, right and left posterior lateral, right and left anterior medial, right and left posterior medial, right and left base all sent in formalin-20 cores taken      Estimated Blood Loss:   Minimal    Drains:  * No LDAs found *    Anesthesia Type:   General/LMA    Operative Indications:  Elevated PSA    Operative Findings:  88 cc gland. BPH with obstruction. Peripheral zone cores taken, 20 cores. Complications:   None    Procedure and Technique:  Patient with an elevated PSA here for transperineal prostate biopsy, transrectal ultrasound-guided. The procedure as well as the rationale have been explained to the patient, as well as the risks of bleeding infection urinary retention and need for additional procedures and he gives informed consent. He was originally set up for TURP at the same time, but the patient has decided he does not wish to undergo this presently. He has significant BPH with obstruction. The patient was brought into the operating room identified properly. The patient was prepped and draped in the dorsal lithotomy position after being placed under IV sedation, and a time-out was performed. Care was taken to make sure that all pressure points were addressed. 10 cc of 2% xylocaine were injected with a 25 gauge needle in a 10 cc syringe approximately 1 in from the anus in the perineum bilaterally. I placed the ultrasound probe into the rectum, and took volumetric measurements. This showed a volume of 88 cc and the precision point kit used to perform the biopsy.   I then took the biopsy needle and under ultrasound guidance and biplanar views, took systematic cores from the anterior medial zones, anterior lateral zones, posterior lateral zones, posterior medial zones and bases on each side of the prostate, 2 cores from each zone. Needle depth and position was confirmed sagittally and transversely. The biopsy needle was cleansed with sterile water between each biopsy sample. When I was done I pulled out the precision point kit needle. I held pressure for approximately 2 minutes against the perineum. I was present for the entire procedure. and A qualified resident physician was not available.     Patient Disposition:  PACU  and hemodynamically stable        SIGNATURE: Andre Sainz MD  DATE: November 22, 2023  TIME: 2:32 PM

## 2023-11-26 PROCEDURE — 88344 IMHCHEM/IMCYTCHM EA MLT ANTB: CPT | Performed by: PATHOLOGY

## 2023-11-26 PROCEDURE — G0416 PROSTATE BIOPSY, ANY MTHD: HCPCS | Performed by: PATHOLOGY

## 2023-11-26 PROCEDURE — 88342 IMHCHEM/IMCYTCHM 1ST ANTB: CPT | Performed by: PATHOLOGY

## 2023-11-27 ENCOUNTER — TELEPHONE (OUTPATIENT)
Dept: UROLOGY | Facility: CLINIC | Age: 63
End: 2023-11-27

## 2023-11-27 NOTE — TELEPHONE ENCOUNTER
Post Op Note    Abdoul Brandt is a 70 y.o. male s/p TRANSPERINEAL ULTRASOUND GUIDED BIOPSY PROSTATE (Perineum)  performed 11/22/2023. Abdoul Brandt is a patient of Dr. Dr. Juan Pablo Romero and is seen at the [facility] office. How would you rate your pain on a scale from 1 to 10, 10 being the worst pain ever? 0    Have you had a fever? No    Have your bowel movements been regular? Yes    Do you have any difficulty urinating? No    Do you have any other questions or concerns that I can address at this time? Patient doing well s/p procedure. Biopsy came back negative and patient was made aware. Plan to keep upcoming appointment with Krystal Phillips to further discuss possibly proceeding with TURP in the future.

## 2023-11-27 NOTE — TELEPHONE ENCOUNTER
----- Message from Magdalene Good MD sent at 11/27/2023  7:58 AM EST -----  Please let him know the good news that his biopsy is negative-no cancer.

## 2023-12-06 ENCOUNTER — APPOINTMENT (OUTPATIENT)
Dept: LAB | Facility: HOSPITAL | Age: 63
End: 2023-12-06
Payer: COMMERCIAL

## 2023-12-06 ENCOUNTER — NURSE TRIAGE (OUTPATIENT)
Age: 63
End: 2023-12-06

## 2023-12-06 DIAGNOSIS — R39.9 URINARY TRACT INFECTION SYMPTOMS: Primary | ICD-10-CM

## 2023-12-06 DIAGNOSIS — R39.9 URINARY TRACT INFECTION SYMPTOMS: ICD-10-CM

## 2023-12-06 LAB
BACTERIA UR QL AUTO: ABNORMAL /HPF
BILIRUB UR QL STRIP: NEGATIVE
CLARITY UR: CLEAR
COLOR UR: YELLOW
GLUCOSE UR STRIP-MCNC: NEGATIVE MG/DL
HGB UR QL STRIP.AUTO: ABNORMAL
KETONES UR STRIP-MCNC: NEGATIVE MG/DL
LEUKOCYTE ESTERASE UR QL STRIP: NEGATIVE
NITRITE UR QL STRIP: NEGATIVE
NON-SQ EPI CELLS URNS QL MICRO: ABNORMAL /HPF
PH UR STRIP.AUTO: 6.5 [PH]
PROT UR STRIP-MCNC: NEGATIVE MG/DL
RBC #/AREA URNS AUTO: ABNORMAL /HPF
SP GR UR STRIP.AUTO: <=1.005 (ref 1–1.03)
UROBILINOGEN UR QL STRIP.AUTO: 0.2 E.U./DL
WBC #/AREA URNS AUTO: ABNORMAL /HPF

## 2023-12-06 PROCEDURE — 87086 URINE CULTURE/COLONY COUNT: CPT

## 2023-12-06 PROCEDURE — 81001 URINALYSIS AUTO W/SCOPE: CPT

## 2023-12-06 NOTE — TELEPHONE ENCOUNTER
Answer Assessment - Initial Assessment Questions  1. REASON FOR CALL or QUESTION: "What is your reason for calling today?" or "How can I best help you?" or "What question do you have that I can help answer?"      Pt returning call that he missed. MA called to confirm urine testing. Informed pt of this information. No further assistance needed. Protocols used:  Information Only Call - No Triage-ADULT-[place]

## 2023-12-06 NOTE — TELEPHONE ENCOUNTER
Patient had a prostate biopsy on 11/22/2023 and he is still having blood in the urine with small clots at times. He denies abdominal pain, sometimes experiences discomfort in the penis sometimes. He does have to strain at times to void and experiences urinary urgency and frequency. No fevers, chills. Voiding well with not dysuria. He is hydrating but drinks other beverages than water. He denies constipation. Patient not experiencing any nausea or vomiting. I gave him care advice to rest and hydrate well, avoid bladder irritants and hydrate with water, avoid constipation. Urine orders placed to go to the lab today. ER precautions reviewed with the patient. Please advise of any further recommendations. Answer Assessment - Initial Assessment Questions  1. SYMPTOM: "What's the main symptom you're concerned about?" (e.g., pain, fever, vomiting)      Blood in urine small clots at times  2. ONSET: "When did symptoms  start?"      Since procedure  3. SURGERY: "What surgery was performed?"      Prostate biopsy  4. DATE of SURGERY: "When was surgery performed?"       11/22/2023  5. ANESTHESIA: " What type of anesthesia did you have?" (e.g., general, spinal, epidural, local)      General   6. PAIN: "Is there any pain?" If Yes, ask: "How bad is it?"  (Scale 1-10; or mild, moderate, severe)      No   7. FEVER: "Do you have a fever?" If Yes, ask: "What is your temperature, how was it measured, and when did it start?"      No   8. VOMITING: "Is there any vomiting?" If yes, ask: "How many times?"      No   9. BLEEDING: "Is there any bleeding?" If Yes, ask: "How much?" and "Where?"      In urine   10.  OTHER SYMPTOMS: "Do you have any other symptoms?" (e.g., drainage from wound, painful urination, constipation)        No    Protocols used: Post-Op Symptoms and Questions-ADULT-[place]

## 2023-12-06 NOTE — TELEPHONE ENCOUNTER
Per separate encounter, patient returned call and spoke with [address] He was advised to proceed with urine testing at this time and await results.

## 2023-12-06 NOTE — TELEPHONE ENCOUNTER
Agree with urine testing and increasing water intake. Avoid bladder irritants. We will see what urine testing shows.

## 2023-12-07 LAB — BACTERIA UR CULT: NORMAL

## 2023-12-08 NOTE — TELEPHONE ENCOUNTER
Returned call to patient and advised that plan is to keep scheduled appointment on 12/19 to further discuss TURP and if this is something he wishes to proceed with.

## 2023-12-08 NOTE — TELEPHONE ENCOUNTER
Patient called stating he missed the call from the office. Message given about uc results as per note below. Urine culture negative and patient is continue to drink plenty of water. Patient aware. He has a question about rescheduling his TURP or should he wait until he comes in on 12/19 appointment to discuss it. He was to keep appointment after having biopsy done.      Patient can be reached at [phone]

## 2023-12-08 NOTE — RESULT ENCOUNTER NOTE
Please let patient know his urine culture is negative. He should continue to drink plenty of water. Call with worsening hematuria or difficulty urinating.

## 2023-12-19 ENCOUNTER — OFFICE VISIT (OUTPATIENT)
Dept: UROLOGY | Facility: CLINIC | Age: 63
End: 2023-12-19
Payer: COMMERCIAL

## 2023-12-19 VITALS
BODY MASS INDEX: 29.72 KG/M2 | OXYGEN SATURATION: 98 % | WEIGHT: 207.6 LBS | DIASTOLIC BLOOD PRESSURE: 82 MMHG | HEART RATE: 67 BPM | SYSTOLIC BLOOD PRESSURE: 120 MMHG | HEIGHT: 70 IN

## 2023-12-19 DIAGNOSIS — N40.1 BENIGN PROSTATIC HYPERPLASIA WITH INCOMPLETE BLADDER EMPTYING: ICD-10-CM

## 2023-12-19 DIAGNOSIS — R39.14 BENIGN PROSTATIC HYPERPLASIA WITH INCOMPLETE BLADDER EMPTYING: ICD-10-CM

## 2023-12-19 DIAGNOSIS — R97.20 ELEVATED PSA: Primary | ICD-10-CM

## 2023-12-19 LAB
BACTERIA UR QL AUTO: NORMAL /HPF
BILIRUB UR QL STRIP: NEGATIVE
CLARITY UR: CLEAR
COLOR UR: NORMAL
GLUCOSE UR STRIP-MCNC: NEGATIVE MG/DL
HGB UR QL STRIP.AUTO: NEGATIVE
KETONES UR STRIP-MCNC: NEGATIVE MG/DL
LEUKOCYTE ESTERASE UR QL STRIP: NEGATIVE
NITRITE UR QL STRIP: NEGATIVE
NON-SQ EPI CELLS URNS QL MICRO: NORMAL /HPF
PH UR STRIP.AUTO: 6 [PH]
POST-VOID RESIDUAL VOLUME, ML POC: 320 ML
PROT UR STRIP-MCNC: NEGATIVE MG/DL
RBC #/AREA URNS AUTO: NORMAL /HPF
SL AMB  POCT GLUCOSE, UA: NEGATIVE
SL AMB LEUKOCYTE ESTERASE,UA: NEGATIVE
SL AMB POCT BILIRUBIN,UA: NEGATIVE
SL AMB POCT BLOOD,UA: NEGATIVE
SL AMB POCT CLARITY,UA: CLEAR
SL AMB POCT COLOR,UA: YELLOW
SL AMB POCT KETONES,UA: NEGATIVE
SL AMB POCT NITRITE,UA: NEGATIVE
SL AMB POCT PH,UA: 6
SL AMB POCT SPECIFIC GRAVITY,UA: 1.01
SL AMB POCT URINE PROTEIN: NEGATIVE
SL AMB POCT UROBILINOGEN: 0.2
SP GR UR STRIP.AUTO: 1.01 (ref 1–1.03)
UROBILINOGEN UR STRIP-ACNC: <2 MG/DL
WBC #/AREA URNS AUTO: NORMAL /HPF

## 2023-12-19 PROCEDURE — 51798 US URINE CAPACITY MEASURE: CPT

## 2023-12-19 PROCEDURE — 81001 URINALYSIS AUTO W/SCOPE: CPT

## 2023-12-19 PROCEDURE — 99213 OFFICE O/P EST LOW 20 MIN: CPT

## 2023-12-19 PROCEDURE — 87086 URINE CULTURE/COLONY COUNT: CPT

## 2023-12-19 PROCEDURE — 81002 URINALYSIS NONAUTO W/O SCOPE: CPT

## 2023-12-19 NOTE — PROGRESS NOTES
12/19/2023    Chief Complaint   Patient presents with    Follow-up      Follow-up evaluation to review prostate biopsy results. PVR and urine dip done today.       Assessment and Plan    63 y.o. male manage by Dr. Rodrigues    1.  Elevated PSA  S/p negative transperineal prostate biopsy by Dr. Rodrigues on 11/22/2023  Pre-biopsy PSA 5.06 July 2023.   PSA as high as 15.7 November 2022  Negative mpMRI October 2022  Large 122 cc prostate  Elevated PSA likely due to large prostate gland.  PSA in 3 months I will call him with those results    2.  BPH with LUTS  Was originally scheduled for TURP and transperineal prostate biopsy on 11/22/2023.  However he changes mind the day of the procedure and only wished to proceed with a transperineal prostate biopsy.  Still with bothersome lower urinary tract symptoms and elevated PVRs with PVR today of 320 mL.  Denies any abdominal pain, distention, or difficulty urinating.  Does complain of mild dysuria which she reports is mostly chronic.    Urine dip is negative for infection but we will send this for formal culture.  I again reviewed options regarding surgical treatment of his BPH.  I reviewed both TURP as well as greenlight laser TURP.  After discussion patient wishes to proceed with a TURP and I placed a case request for this.  Technique, benefits, and risk of the procedure listed above were discussed with them today and informed written consent was obtained.  All questions and concerns regarding surgery and perioperative expectations have been addressed and answered.  No overall changes in their health since last visit.  Denies any prior complications with anesthesia.   Continue Flomax and finasteride.  Patient declined [facility] teaching today.        History of Present Illness  Vini Ybarra is a 63 y.o. male here for follow-up evaluation to review prostate biopsy results as well as to discuss TURP.      History of BPH with obstruction with large median lobe with incomplete bladder  "emptying.  Also has elevated PSA which the BPH is most likely the source.  He was started on Proscar and his PSA has dropped to 5.06 as of July 19, 2023.  PSA was 8.8 January 13, 2023, and was 15.7 November 14, 2022, 8.9 August 10, 2022.  He is status post negative transperineal prostate biopsy by Dr. Rodrigues on 11/22/2023.      In regards to his BPH, his symptoms are becoming worse despite the finasteride and the 0.8 mg total of Flomax.  He may have lost a little bit of libido with the finasteride.  During his last office visit with Dr. Rodrigues in July 2023 he discussed recommendations to proceed with a TURP.          Review of Systems   Constitutional:  Negative for chills and fever.   HENT:  Negative for congestion and sore throat.    Respiratory:  Negative for cough and shortness of breath.    Cardiovascular:  Negative for chest pain and leg swelling.   Gastrointestinal:  Negative for abdominal pain, constipation and diarrhea.   Genitourinary:  Positive for frequency and urgency. Negative for dysuria and hematuria.        Weak Urinary stream   Musculoskeletal:  Negative for back pain and gait problem.   Skin:  Negative for wound.   Allergic/Immunologic: Negative for immunocompromised state.   Hematological:  Does not bruise/bleed easily.               Vitals  Vitals:    12/19/23 0944   BP: 120/82   Pulse: 67   SpO2: 98%   Weight: 94.2 kg (207 lb 9.6 oz)   Height: 5' 10\" (1.778 m)       Physical Exam  Vitals reviewed.   Constitutional:       General: He is not in acute distress.     Appearance: Normal appearance. He is not ill-appearing or toxic-appearing.   HENT:      Head: Normocephalic and atraumatic.   Eyes:      General: No scleral icterus.     Conjunctiva/sclera: Conjunctivae normal.   Cardiovascular:      Rate and Rhythm: Normal rate.   Pulmonary:      Effort: Pulmonary effort is normal. No respiratory distress.   Abdominal:      General: There is no distension.      Palpations: Abdomen is soft.      " Tenderness: There is no abdominal tenderness. There is no right CVA tenderness or left CVA tenderness.      Hernia: No hernia is present.   Musculoskeletal:      Cervical back: Normal range of motion.      Right lower leg: No edema.      Left lower leg: No edema.   Skin:     General: Skin is warm and dry.      Coloration: Skin is not jaundiced or pale.   Neurological:      General: No focal deficit present.      Mental Status: He is alert and oriented to person, place, and time. Mental status is at baseline.      Gait: Gait normal.   Psychiatric:         Mood and Affect: Mood normal.         Behavior: Behavior normal.         Thought Content: Thought content normal.         Judgment: Judgment normal.         Past History  Past Medical History:   Diagnosis Date    Chronic pain disorder     Spinal cord dysplasia (HCC)      Social History     Socioeconomic History    Marital status: Single     Spouse name: None    Number of children: None    Years of education: None    Highest education level: None   Occupational History    None   Tobacco Use    Smoking status: Former     Passive exposure: Past    Smokeless tobacco: Never   Vaping Use    Vaping status: Never Used   Substance and Sexual Activity    Alcohol use: Yes     Comment: occasionally    Drug use: Not Currently    Sexual activity: Not Currently   Other Topics Concern    None   Social History Narrative    None     Social Determinants of Health     Financial Resource Strain: Low Risk  (9/29/2023)    Overall Financial Resource Strain (CARDIA)     Difficulty of Paying Living Expenses: Not hard at all   Food Insecurity: Not on file   Transportation Needs: No Transportation Needs (9/29/2023)    PRAPARE - Transportation     Lack of Transportation (Medical): No     Lack of Transportation (Non-Medical): No   Physical Activity: Not on file   Stress: Not on file   Social Connections: Not on file   Intimate Partner Violence: Not on file   Housing Stability: Not on file      Social History     Tobacco Use   Smoking Status Former    Passive exposure: Past   Smokeless Tobacco Never     Family History   Problem Relation Age of Onset    Cancer Father     Lung cancer Brother     Cancer Son        The following portions of the patient's history were reviewed and updated as appropriate allergies, current medications, past medical history, past social history, past surgical history and problem list    Imaging:    Results  Recent Results (from the past 1 hour(s))   POCT Measure PVR    Collection Time: 12/19/23 10:11 AM   Result Value Ref Range    POST-VOID RESIDUAL VOLUME, ML  mL   POCT urine dip    Collection Time: 12/19/23 10:11 AM   Result Value Ref Range    LEUKOCYTE ESTERASE,UA negative     NITRITE,UA negative     SL AMB POCT UROBILINOGEN 0.2     POCT URINE PROTEIN negative      PH,UA 6.0     BLOOD,UA negative     SPECIFIC GRAVITY,UA 1.010     KETONES,UA negative     BILIRUBIN,UA negative     GLUCOSE, UA negative      COLOR,UA yellow     CLARITY,UA clear    ]  Lab Results   Component Value Date    PSA 5.06 (H) 07/19/2023    PSA 8.8 (H) 01/13/2023    PSA 15.7 (H) 11/14/2022    PSA 8.9 (H) 08/10/2022     Lab Results   Component Value Date    GLUCOSE 89 06/07/2014    CALCIUM 9.2 09/29/2023     06/07/2014    K 4.5 09/29/2023    CO2 27 09/29/2023     09/29/2023    BUN 14 09/29/2023    CREATININE 1.13 09/29/2023     Lab Results   Component Value Date    WBC 4.51 09/29/2023    HGB 13.5 09/29/2023    HCT 41.4 09/29/2023    MCV 93 09/29/2023     09/29/2023       Please Note:  Voice dictation software has been used to create this document. There may be inadvertent transcriptions errors.     LILIANA Moore 12/19/23

## 2023-12-20 LAB — BACTERIA UR CULT: NORMAL

## 2023-12-21 ENCOUNTER — PREP FOR PROCEDURE (OUTPATIENT)
Dept: UROLOGY | Facility: CLINIC | Age: 63
End: 2023-12-21

## 2023-12-21 ENCOUNTER — TELEPHONE (OUTPATIENT)
Dept: UROLOGY | Facility: CLINIC | Age: 63
End: 2023-12-21

## 2023-12-21 ENCOUNTER — TELEPHONE (OUTPATIENT)
Age: 63
End: 2023-12-21

## 2023-12-31 PROBLEM — Z12.11 SCREENING FOR COLON CANCER: Status: RESOLVED | Noted: 2023-11-01 | Resolved: 2023-12-31

## 2024-01-16 ENCOUNTER — HOSPITAL ENCOUNTER (OUTPATIENT)
Dept: PERIOP | Facility: HOSPITAL | Age: 64
Setting detail: OUTPATIENT SURGERY
Discharge: HOME | End: 2024-01-16
Payer: COMMERCIAL

## 2024-01-16 ENCOUNTER — ANESTHESIA EVENT (OUTPATIENT)
Dept: PERIOP | Facility: HOSPITAL | Age: 64
End: 2024-01-16

## 2024-01-16 ENCOUNTER — ANESTHESIA (OUTPATIENT)
Dept: PERIOP | Facility: HOSPITAL | Age: 64
End: 2024-01-16

## 2024-01-16 VITALS
OXYGEN SATURATION: 97 % | RESPIRATION RATE: 18 BRPM | DIASTOLIC BLOOD PRESSURE: 75 MMHG | TEMPERATURE: 98.4 F | HEART RATE: 57 BPM | SYSTOLIC BLOOD PRESSURE: 130 MMHG

## 2024-01-16 DIAGNOSIS — K21.9 GASTROESOPHAGEAL REFLUX DISEASE, UNSPECIFIED WHETHER ESOPHAGITIS PRESENT: ICD-10-CM

## 2024-01-16 DIAGNOSIS — R10.13 EPIGASTRIC PAIN: ICD-10-CM

## 2024-01-16 DIAGNOSIS — R13.19 ESOPHAGEAL DYSPHAGIA: ICD-10-CM

## 2024-01-16 PROCEDURE — 43239 EGD BIOPSY SINGLE/MULTIPLE: CPT | Performed by: INTERNAL MEDICINE

## 2024-01-16 PROCEDURE — 88305 TISSUE EXAM BY PATHOLOGIST: CPT | Performed by: PATHOLOGY

## 2024-01-16 RX ORDER — SODIUM CHLORIDE, SODIUM LACTATE, POTASSIUM CHLORIDE, CALCIUM CHLORIDE 600; 310; 30; 20 MG/100ML; MG/100ML; MG/100ML; MG/100ML
INJECTION, SOLUTION INTRAVENOUS CONTINUOUS PRN
Status: DISCONTINUED | OUTPATIENT
Start: 2024-01-16 | End: 2024-01-16

## 2024-01-16 RX ORDER — LIDOCAINE HYDROCHLORIDE 20 MG/ML
INJECTION, SOLUTION EPIDURAL; INFILTRATION; INTRACAUDAL; PERINEURAL AS NEEDED
Status: DISCONTINUED | OUTPATIENT
Start: 2024-01-16 | End: 2024-01-16

## 2024-01-16 RX ORDER — PROPOFOL 10 MG/ML
INJECTION, EMULSION INTRAVENOUS AS NEEDED
Status: DISCONTINUED | OUTPATIENT
Start: 2024-01-16 | End: 2024-01-16

## 2024-01-16 RX ADMIN — LIDOCAINE HYDROCHLORIDE 100 MG: 20 INJECTION, SOLUTION EPIDURAL; INFILTRATION; INTRACAUDAL; PERINEURAL at 13:29

## 2024-01-16 RX ADMIN — PROPOFOL 50 MG: 10 INJECTION, EMULSION INTRAVENOUS at 13:34

## 2024-01-16 RX ADMIN — PROPOFOL 150 MG: 10 INJECTION, EMULSION INTRAVENOUS at 13:29

## 2024-01-16 RX ADMIN — SODIUM CHLORIDE, SODIUM LACTATE, POTASSIUM CHLORIDE, AND CALCIUM CHLORIDE: .6; .31; .03; .02 INJECTION, SOLUTION INTRAVENOUS at 13:22

## 2024-01-16 RX ADMIN — PROPOFOL 50 MG: 10 INJECTION, EMULSION INTRAVENOUS at 13:31

## 2024-01-16 NOTE — ANESTHESIA PREPROCEDURE EVALUATION
Procedure:  EGD    Relevant Problems   GI/HEPATIC   (+) Esophageal dysphagia   (+) Gastroesophageal reflux disease      MUSCULOSKELETAL   (+) Arthritis      NEURO/PSYCH   (+) Chronic hand pain, right             Anesthesia Plan  ASA Score- 2     Anesthesia Type- IV sedation with anesthesia with ASA Monitors.         Additional Monitors:     Airway Plan:            Plan Factors-    Chart reviewed.                      Induction- intravenous.    Postoperative Plan-     Informed Consent- Anesthetic plan and risks discussed with patient.  I personally reviewed this patient with the CRNA. Discussed and agreed on the Anesthesia Plan with the CRNA..

## 2024-01-16 NOTE — H&P
History and Physical - [facility]  Vini Ybarra 63 y.o. male MRN: [id number]                  HPI: Vini Ybarra is a 63 y.o. year old male who presents for reflux and dysphagia.      REVIEW OF SYSTEMS: Per the HPI, and otherwise unremarkable.    Historical Information   Past Medical History:   Diagnosis Date    Chronic pain disorder     Spinal cord dysplasia (HCC)      Past Surgical History:   Procedure Laterality Date    CERVICAL FUSION      HERNIA REPAIR Left     NASAL SEPTOPLASTY W/ TURBINOPLASTY  08/22/2003    IA BX PROSTATE STRTCTC SATURATION SAMPLING IMG GID N/A 11/22/2023    Procedure: TRANSPERINEAL ULTRASOUND GUIDED BIOPSY PROSTATE;  Surgeon: Luis Antonio Rodrigues MD;  Location: [facility];  Service: Urology    TOOTH EXTRACTION       Social History   Social History     Substance and Sexual Activity   Alcohol Use Yes    Comment: occasionally     Social History     Substance and Sexual Activity   Drug Use Not Currently     Social History     Tobacco Use   Smoking Status Former    Passive exposure: Past   Smokeless Tobacco Never     Family History   Problem Relation Age of Onset    Cancer Father     Lung cancer Brother     Cancer Son        Meds/Allergies       Current Outpatient Medications:     cholecalciferol (VITAMIN D3) 1,000 units tablet    finasteride (PROSCAR) 5 mg tablet    gabapentin (NEURONTIN) 300 mg capsule    ibuprofen (MOTRIN) 800 mg tablet    oxyCODONE (OXY-IR) 5 MG capsule    tamsulosin (FLOMAX) 0.4 mg    Zinc 50 MG CAPS    famotidine (PEPCID) 40 MG tablet    Allergies   Allergen Reactions    Acetaminophen Other (See Comments)     Other reaction(s): TONGUE LIPS NOSE TINGLING SENSAT         Objective     /76   Pulse 69   Temp (!) 97.1 °F (36.2 °C) (Tympanic)   Resp 18   SpO2 97%       PHYSICAL EXAM    Gen: NAD  Head: NCAT  CV: RRR  CHEST: Clear  ABD: soft, NT/ND  EXT: no edema      ASSESSMENT/PLAN:  This is a 63 y.o. year old male here for upper endoscopy, and he is stable  and optimized for his procedure.

## 2024-01-16 NOTE — ANESTHESIA POSTPROCEDURE EVALUATION
Post-Op Assessment Note    CV Status:  Stable    Pain management: satisfactory to patient       Mental Status:  Sleepy   Hydration Status:  Euvolemic   PONV Controlled:  Controlled   Airway Patency:  Patent     Post Op Vitals Reviewed: Yes      Staff: CRNA               BP  104/58   Temp   98.3   Pulse  62   Resp   18   SpO2   97

## 2024-01-18 PROCEDURE — 88305 TISSUE EXAM BY PATHOLOGIST: CPT | Performed by: PATHOLOGY

## 2024-01-19 DIAGNOSIS — G95.9 MYELOPATHY (HCC): ICD-10-CM

## 2024-01-19 RX ORDER — OXYCODONE HYDROCHLORIDE 5 MG/1
5 CAPSULE ORAL 2 TIMES DAILY PRN
Qty: 60 CAPSULE | Refills: 0 | Status: SHIPPED | OUTPATIENT
Start: 2024-01-19

## 2024-01-20 ENCOUNTER — PREP FOR PROCEDURE (OUTPATIENT)
Dept: GASTROENTEROLOGY | Facility: CLINIC | Age: 64
End: 2024-01-20

## 2024-01-20 DIAGNOSIS — K21.9 GASTROESOPHAGEAL REFLUX DISEASE, UNSPECIFIED WHETHER ESOPHAGITIS PRESENT: ICD-10-CM

## 2024-01-20 DIAGNOSIS — N40.0 BENIGN PROSTATIC HYPERPLASIA, UNSPECIFIED WHETHER LOWER URINARY TRACT SYMPTOMS PRESENT: ICD-10-CM

## 2024-01-20 DIAGNOSIS — K20.0 EOSINOPHILIC ESOPHAGITIS: ICD-10-CM

## 2024-01-20 DIAGNOSIS — R97.20 ELEVATED PSA: ICD-10-CM

## 2024-01-20 DIAGNOSIS — R10.13 EPIGASTRIC PAIN: ICD-10-CM

## 2024-01-20 DIAGNOSIS — R13.19 ESOPHAGEAL DYSPHAGIA: Primary | ICD-10-CM

## 2024-01-20 RX ORDER — OMEPRAZOLE 40 MG/1
40 CAPSULE, DELAYED RELEASE ORAL DAILY
Qty: 30 CAPSULE | Refills: 5 | Status: SHIPPED | OUTPATIENT
Start: 2024-01-20

## 2024-01-20 NOTE — RESULT ENCOUNTER NOTE
I called him with his results.  Biopsies consistent with eosinophilic esophagitis. I will start omeprazole 40mg daily and repeat EGD in 3 months. If no improvement in symptoms and biopsies, consider Dupixent as next option.

## 2024-01-22 RX ORDER — FINASTERIDE 5 MG/1
5 TABLET, FILM COATED ORAL DAILY
Qty: 90 TABLET | Refills: 1 | Status: SHIPPED | OUTPATIENT
Start: 2024-01-22

## 2024-01-31 ENCOUNTER — OFFICE VISIT (OUTPATIENT)
Age: 64
End: 2024-01-31
Payer: COMMERCIAL

## 2024-01-31 VITALS
DIASTOLIC BLOOD PRESSURE: 62 MMHG | SYSTOLIC BLOOD PRESSURE: 138 MMHG | WEIGHT: 216.2 LBS | HEART RATE: 85 BPM | OXYGEN SATURATION: 96 % | HEIGHT: 70 IN | BODY MASS INDEX: 30.95 KG/M2 | TEMPERATURE: 97.1 F

## 2024-01-31 DIAGNOSIS — K21.9 GASTROESOPHAGEAL REFLUX DISEASE, UNSPECIFIED WHETHER ESOPHAGITIS PRESENT: ICD-10-CM

## 2024-01-31 DIAGNOSIS — R13.19 ESOPHAGEAL DYSPHAGIA: ICD-10-CM

## 2024-01-31 DIAGNOSIS — R10.13 EPIGASTRIC PAIN: Primary | ICD-10-CM

## 2024-01-31 DIAGNOSIS — K20.0 EOSINOPHILIC ESOPHAGITIS: ICD-10-CM

## 2024-01-31 DIAGNOSIS — Z12.11 SCREENING FOR COLON CANCER: ICD-10-CM

## 2024-01-31 PROCEDURE — 99214 OFFICE O/P EST MOD 30 MIN: CPT | Performed by: NURSE PRACTITIONER

## 2024-01-31 RX ORDER — FAMOTIDINE 40 MG/1
TABLET, FILM COATED ORAL
Qty: 30 TABLET | Refills: 5 | Status: SHIPPED | OUTPATIENT
Start: 2024-01-31 | End: 2024-02-05 | Stop reason: ALTCHOICE

## 2024-01-31 RX ORDER — OMEPRAZOLE 40 MG/1
40 CAPSULE, DELAYED RELEASE ORAL DAILY
Qty: 30 CAPSULE | Refills: 5 | Status: SHIPPED | OUTPATIENT
Start: 2024-01-31

## 2024-01-31 NOTE — PROGRESS NOTES
[facility] - Outpatient Follow-up Note  Vini Ybarra 63 y.o. male MRN: [id number]  Encounter: [id number]          ASSESSMENT AND PLAN:    The patient presents today for follow-up office visit regarding his epigastric pain, esophageal dysphagia, GERD symptoms and follow-up from his EGD with findings of EOE.     Exam: Abdomen is soft, nondistended, with mild epigastric tenderness and upon exam without any significant guarding or rebound tenderness noted, with hypoactive bowel sounds x 4. No edema noted of the b/l lower extremities upon exam today. Skin is non-icteric.      1. Epigastric pain  2. Esophageal dysphagia  3. Gastroesophageal reflux disease, unspecified whether esophagitis present  4. Eosinophilic esophagitis    While the patient was in the office today, I discussed with them that eosinophilic esophagitis (EOE) is a chronic immune or antigen-mediated process where there is mucosal inflammation predominantly with eosinophils, which is confined to the esophagus only and was diagnosed from the esophageal biopsy during his EGD. This can be triggered by the following foods: milk, eggs, wheat, soy, fish, and nuts. Typical treatment is centered around management with PPI medications (protonix, nexium, omeprazole, ect) and avoidance of the trigger foods.     I discussed the patient that at this point time I do feel would be beneficial for him to start the omeprazole 40 mg daily as recommended by Dr. Vickers and continue to use the famotidine as needed for any breakthrough reflux symptoms for the next 3 months.  I discussed with the patient that in 3 to 4 months we would schedule him for repeat EGD to evaluate for healing and perform another biopsy to prove that the EOE has been resolved.  If not we would then consider Dupixent as a medication option in the future.  The patient was agreeable and verbalized an understanding.    I discussed the risks of procedure with the  patient including, but not limited to: bleeding, infection, sore throat, and perforation. The patient gave verbal understanding and is agreeable to proceed.      Encouraged the patient to avoid trigger foods as much as possible.    - EGD; Future  - famotidine (PEPCID) 40 MG tablet; Take 1 PO HS.  Dispense: 30 tablet; Refill: 5  - omeprazole (PriLOSEC) 40 MG capsule; Take 1 capsule (40 mg total) by mouth daily  Dispense: 30 capsule; Refill: 5    5. Screening for colon cancer  I discussed with the patient today that since they are not currently experiencing any red flag symptoms, we will hold off on any repeat colonoscopies until the recommended surveillance date unless the patient would start to develop red flag symptoms in the future.  The patient was agreeable and verbalized an understanding.  DUE: 2026    While the patient was in the office today we did review GI red flag symptoms, including, but not limited to: chronic nausea, vomiting, diarrhea, chills, fever, and unintentional weight loss and should call or contact our office with any changes or concerns. I reviewed with the patient that if they notice any blood while vomiting or in their stool they should contact or office or go to the nearest emergency room for immediate evaluation. The patient was agreeable and verbalized an understanding.      The patient will schedule a follow up office visit after his repeat EGD, but understands to call or contact our office if there are any issues or concerns in the mean time.  ______________________________________________________________________    SUBJECTIVE: Patient is a 63 y.o. male who was previously seen in our office on 11/1/23 and presents today for follow-up office visit regarding his epigastric pain, esophageal dysphagia, GERD symptoms and follow-up from his EGD with findings of EOE.  Since his last office visit the patient did proceed with the EGD as recommended on January 16, 2024 with Dr. Vickers which did  show a very small 1 cm hiatal hernia with the pathology coming back positive for EOE.  The patient was prescribed omeprazole 40 mg daily and could continue to use the famotidine as needed, however, reports that he did not start the omeprazole as he wanted to discuss it in the office first and better understand the results of the EGD before he started the medication.  He does feel that since the EGD the dysphagia is slightly improved but continues with the reflux symptoms.    The patient reports he is having regular, none straining bowel movements on a daily basis without any evidence of melena or blood.    Meds: Famotidine 40 mg as needed.  NSAID Use: Denied    Imaging: (None):     Endoscopy History: EGD: (1/16/24): 1 cm hiatal hernia. Path: + EOE with a recommendation to start omeprazole 40 mg daily and then proceed with a repeat EGD in 3 to 4 months for evaluation of resolution and consideration for possible Dupixent if the EOE has not resolved.    COLONOSCOPY:  (1-2 years ago): Dr Sharma or Dr Bruno. Normal according to the patient and is to have repeat in 5 years.       DUE: 2026    REVIEW OF SYSTEMS IS OTHERWISE NEGATIVE.      Historical Information   Past Medical History:   Diagnosis Date    Chronic pain disorder     Spinal cord dysplasia (HCC)      Past Surgical History:   Procedure Laterality Date    CERVICAL FUSION      HERNIA REPAIR Left     NASAL SEPTOPLASTY W/ TURBINOPLASTY  08/22/2003    MS BX PROSTATE STRTCTC SATURATION SAMPLING IMG GID N/A 11/22/2023    Procedure: TRANSPERINEAL ULTRASOUND GUIDED BIOPSY PROSTATE;  Surgeon: Luis Antonio Rodrigues MD;  Location: [facility];  Service: Urology    TOOTH EXTRACTION       Social History   Social History     Substance and Sexual Activity   Alcohol Use Yes    Comment: occasionally     Social History     Substance and Sexual Activity   Drug Use Not Currently     Social History     Tobacco Use   Smoking Status Former    Passive exposure: Past   Smokeless Tobacco Never  "    Family History   Problem Relation Age of Onset    Cancer Father     Lung cancer Brother     Cancer Son        Meds/Allergies       Current Outpatient Medications:     cholecalciferol (VITAMIN D3) 1,000 units tablet    finasteride (PROSCAR) 5 mg tablet    gabapentin (NEURONTIN) 300 mg capsule    ibuprofen (MOTRIN) 800 mg tablet    omeprazole (PriLOSEC) 40 MG capsule    oxyCODONE (OXY-IR) 5 MG capsule    tamsulosin (FLOMAX) 0.4 mg    Zinc 50 MG CAPS    famotidine (PEPCID) 40 MG tablet    Allergies   Allergen Reactions    Acetaminophen Other (See Comments)     Other reaction(s): TONGUE LIPS NOSE TINGLING SENSAT             Objective     Blood pressure 138/62, pulse 85, temperature (!) 97.1 °F (36.2 °C), temperature source Temporal, height 5' 10\" (1.778 m), weight 98.1 kg (216 lb 3.2 oz), SpO2 96%. Body mass index is 31.02 kg/m².      PHYSICAL EXAM:      General Appearance:   Alert, cooperative, no distress   HEENT:   Normocephalic, atraumatic, anicteric.     Neck:  Supple, symmetrical, trachea midline   Lungs:   Clear to auscultation bilaterally; no rales, rhonchi or wheezing; respirations unlabored    Heart::   Regular rate and rhythm; no murmur, rub, or gallop.   Abdomen:   Soft, non-tender, non-distended; normal bowel sounds; no masses, no organomegaly    Genitalia:   Deferred    Rectal:   Deferred    Extremities:  No cyanosis, clubbing or edema    Pulses:  2+ and symmetric    Skin:  No jaundice, rashes, or lesions    Lymph nodes:  No palpable cervical lymphadenopathy        Lab Results:   No visits with results within 1 Day(s) from this visit.   Latest known visit with results is:   Hospital Outpatient Visit on 01/16/2024   Component Date Value    Case Report 01/16/2024                      Value:Surgical Pathology Report                         Case: [id number]                                  Authorizing Provider:  Korey Vickers MD           Collected:           01/16/2024 [id number]              Ordering Location: "     [facility] Received:            01/16/2024 1456                                     Operating Room                                                               Pathologist:           Robert Diaz MD                                                            Specimens:   A) - Esophagus, distal esophagus R/O EOE                                                            B) - Esophagus, proximal esophagus R/O EOE                                                          C) - Stomach, R/O h pylori                                                                          D) - Duodenum, R/O celiac                                                                  Final Diagnosis 01/16/2024                      Value:This result contains rich text formatting which cannot be displayed here.    Note 01/16/2024                      Value:This result contains rich text formatting which cannot be displayed here.    Additional Information 01/16/2024                      Value:This result contains rich text formatting which cannot be displayed here.    Gross Description 01/16/2024                      Value:This result contains rich text formatting which cannot be displayed here.    Clinical Information 01/16/2024                      Value:GERD  · 1 cm hiatal hernia - GE junction 39 cm from the incisors, diaphragmatic impression 40 cm from the incisors:  Hill classification: Grade I  · The esophagus and duodenum appeared normal.  · Performed multiple forceps biopsies in the upper third of the esophagus, lower third of the esophagus, stomach and duodenum              Radiology Results:   EGD    Result Date: 1/16/2024  Narrative: Table formatting from the original result was not included. [facility] Operating Room [address] [address] [phone] DATE OF SERVICE: 1/16/24 PHYSICIAN(S): Attending: Korey Vickers MD Fellow: No Staff Documented INDICATION: Esophageal dysphagia,  Epigastric pain, Gastroesophageal reflux disease, unspecified whether esophagitis present POST-OP DIAGNOSIS: See the impression below. PREPROCEDURE: Informed consent was obtained for the procedure, including sedation.  Risks of perforation, hemorrhage, adverse drug reaction and aspiration were discussed. The patient was placed in the left lateral decubitus position. Patient was explained about the risks and benefits of the procedure. Risks including but not limited to bleeding, infection, and perforation were explained in detail. Also explained about less than 100% sensitivity with the exam and other alternatives. PROCEDURE: EGD DETAILS OF PROCEDURE: Patient was taken to the procedure room where a time out was performed to confirm correct patient and correct procedure. The patient underwent monitored anesthesia care, which was administered by an anesthesia professional. The patient's blood pressure, heart rate, level of consciousness, respirations and oxygen were monitored throughout the procedure. The scope was advanced to the second part of the duodenum. Retroflexion was performed in the fundus. The patient experienced no blood loss. The procedure was not difficult. The patient tolerated the procedure well. There were no apparent adverse events. ANESTHESIA INFORMATION: ASA: II Anesthesia Type: IV Sedation with Anesthesia MEDICATIONS: No administrations occurring from 1309 to 1337 on 01/16/24 FINDINGS: 1 cm hiatal hernia - GE junction 39 cm from the incisors, diaphragmatic impression 40 cm from the incisors:  Hill classification: Grade I The esophagus and duodenum appeared normal. Performed multiple forceps biopsies in the upper third of the esophagus, lower third of the esophagus, stomach and duodenum SPECIMENS: ID Type Source Tests Collected by Time Destination 1 : distal esophagus R/O EOE Tissue Esophagus TISSUE EXAM Korey Vickers MD 1/16/2024  1:30 PM  2 : proximal esophagus R/O EOE Tissue Esophagus TISSUE EXAM  Korey Vickers MD 1/16/2024  1:30 PM  3 : R/O h pylori Tissue Stomach TISSUE EXAM Korey Vickers MD 1/16/2024  1:31 PM  4 : R/O celiac Tissue Duodenum TISSUE EXAM Korey Vickers MD 1/16/2024  1:32 PM      Impression: 1 cm hiatal hernia The esophagus and duodenum appeared normal. Performed forceps biopsies in the upper third of the esophagus, lower third of the esophagus, stomach and duodenum RECOMMENDATION:  Await pathology results Continue Pepcid as needed Follow-up in our office for further evaluation and management. If you don't have an appointment scheduled, please call [phone] to schedule your appointment.   Korey Vickers MD

## 2024-01-31 NOTE — PATIENT INSTRUCTIONS
Start Omeprazole 40 mg daily in AM prior to a meal.   Can continue to use famotidine as needed.   Continue to monitor for trigger foods.   Proceed with repeat EGD in 3-4 months.   With regards to the dysphagia symptoms, I encouraged the patient to eat 4-5 small meals daily, take small bites, cut their food into small pieces, chew thoroughly, and take their time while eating to prevent any choking or aspiration. The patient verbalized an understanding.    Continue to try to drink as close to 64 oz of water daily.   Continue to monitor for red flag symptoms.   Schedule a f/u OV after repeat EGD.     While the patient was in the office today we did review GI red flag symptoms, including, but not limited to: chronic nausea, vomiting, diarrhea, chills, fever, and unintentional weight loss and should call or contact our office with any changes or concerns. I reviewed with the patient that if they notice any blood while vomiting or in their stool they should contact or office or go to the nearest emergency room for immediate evaluation. The patient was agreeable and verbalized an understanding.

## 2024-02-05 NOTE — PRE-PROCEDURE INSTRUCTIONS
Pre-Surgery Instructions:   Medication Instructions    cholecalciferol (VITAMIN D3) 1,000 units tablet Stop taking 7 days prior to surgery.    finasteride (PROSCAR) 5 mg tablet Takes in the afternoon    gabapentin (NEURONTIN) 300 mg capsule Uses PRN- OK to take day of surgery    ibuprofen (MOTRIN) 800 mg tablet Stop taking 3 days prior to surgery.    NON FORMULARY Stop taking 7 days prior to surgery.    omeprazole (PriLOSEC) 40 MG capsule Take day of surgery.    oxyCODONE (OXY-IR) 5 MG capsule Uses PRN- OK to take day of surgery    tamsulosin (FLOMAX) 0.4 mg Take night before surgery    Zinc 50 MG CAPS Stop taking 7 days prior to surgery.     Pt verbalizes understanding of the following:    Please reference your “My Surgical Experience Booklet” for additional information to prepare for your upcoming surgery.      - DO NOT EAT OR DRINK ANYTHING after midnight on the evening before your procedure including coffee, tea, gum or hard candy.    - ONLY SIPS OF WATER with your medications are allowed on the morning of your procedure.  - Avoid OTC non-directed Vit/ Suppl/ Herbals 7 days prior to surgery to ensure no drug interactions with perioperative surgical/ anesthetic meds  - Avoid NSAIDs 3 days prior. Tylenol is ok to take as needed.   - Avoid ASA containing products 5 days prior, unless otherwise instructed by your provider   - Bring a list of meds you take at home with your last dose noted    - Avoid alcohol 24hrs before your surgery.     - Follow the pre surgery showering instructions as listed in the “My Surgical Experience Booklet” or otherwise provided by your surgeon's office.  - Bathing instructions, has chg, neck down, no genitals  - No lotions, powders, sprays, deodorant, cologne, jewelry, body piercings  - Do not use a blade to shave the surgical area 1 week before surgery. It is ok to use clean electric clippers up to 24 hours before surgery. Do not shave any body parts with a razor within 24hrs.  - Do not  use dry shampoo, hair spray, hair gel, or any type of hair products.     - For outpatient surgery, arrange for someone to drive you home after the procedure & stay with you until the next morning. Visitor guidelines discussed.   - Bring insurance cards & photo id    - Leave all valuables such as credit cards, money & jewelry at home  - Please bring any specially ordered equipment (sling, braces) if indicated.  - Wear causal clothing that is easy to take on and off. Consider your type of surgery.    - Notify surgeon if you develop any new illnesses, exposure, develop a rash/ open wounds or have additional questions prior to your surgery.    - Did the surgeon's office give you any other special instructions? No  - Did surgeon require any clearances? No    You will receive a call one business day prior to surgery with an arrival time and hospital directions. If your surgery is scheduled on a Monday, the hospital will be calling you on the Friday prior to your surgery.

## 2024-02-08 ENCOUNTER — NURSE TRIAGE (OUTPATIENT)
Age: 64
End: 2024-02-08

## 2024-02-08 NOTE — TELEPHONE ENCOUNTER
Pt called reports having a TURP end of the month. Reports having some blood in urine after intercourse 2 times in Jan. No other symptoms. Advised to just monitor. Discussed good hydration and avoid constipation. Keep upcoming appts.

## 2024-02-19 ENCOUNTER — APPOINTMENT (OUTPATIENT)
Dept: LAB | Facility: HOSPITAL | Age: 64
End: 2024-02-19
Payer: COMMERCIAL

## 2024-02-19 ENCOUNTER — OFFICE VISIT (OUTPATIENT)
Dept: LAB | Facility: HOSPITAL | Age: 64
End: 2024-02-19
Payer: COMMERCIAL

## 2024-02-19 DIAGNOSIS — R97.20 ELEVATED PSA: ICD-10-CM

## 2024-02-19 DIAGNOSIS — N40.1 BENIGN PROSTATIC HYPERPLASIA WITH INCOMPLETE BLADDER EMPTYING: ICD-10-CM

## 2024-02-19 DIAGNOSIS — R39.14 BENIGN PROSTATIC HYPERPLASIA WITH INCOMPLETE BLADDER EMPTYING: ICD-10-CM

## 2024-02-19 LAB
ABO GROUP BLD: NORMAL
ATRIAL RATE: 63 BPM
BASOPHILS # BLD AUTO: 0.08 THOUSANDS/ÂΜL (ref 0–0.1)
BASOPHILS NFR BLD AUTO: 2 % (ref 0–1)
BLD GP AB SCN SERPL QL: NEGATIVE
EOSINOPHIL # BLD AUTO: 0.34 THOUSAND/ÂΜL (ref 0–0.61)
EOSINOPHIL NFR BLD AUTO: 7 % (ref 0–6)
ERYTHROCYTE [DISTWIDTH] IN BLOOD BY AUTOMATED COUNT: 12.8 % (ref 11.6–15.1)
HCT VFR BLD AUTO: 40.4 % (ref 36.5–49.3)
HGB BLD-MCNC: 13.4 G/DL (ref 12–17)
IMM GRANULOCYTES # BLD AUTO: 0.02 THOUSAND/UL (ref 0–0.2)
IMM GRANULOCYTES NFR BLD AUTO: 0 % (ref 0–2)
LYMPHOCYTES # BLD AUTO: 1.76 THOUSANDS/ÂΜL (ref 0.6–4.47)
LYMPHOCYTES NFR BLD AUTO: 37 % (ref 14–44)
MCH RBC QN AUTO: 30.6 PG (ref 26.8–34.3)
MCHC RBC AUTO-ENTMCNC: 33.2 G/DL (ref 31.4–37.4)
MCV RBC AUTO: 92 FL (ref 82–98)
MONOCYTES # BLD AUTO: 0.51 THOUSAND/ÂΜL (ref 0.17–1.22)
MONOCYTES NFR BLD AUTO: 11 % (ref 4–12)
NEUTROPHILS # BLD AUTO: 2.04 THOUSANDS/ÂΜL (ref 1.85–7.62)
NEUTS SEG NFR BLD AUTO: 43 % (ref 43–75)
NRBC BLD AUTO-RTO: 0 /100 WBCS
P AXIS: 33 DEGREES
PLATELET # BLD AUTO: 195 THOUSANDS/UL (ref 149–390)
PMV BLD AUTO: 10.6 FL (ref 8.9–12.7)
PR INTERVAL: 210 MS
PSA SERPL-MCNC: 4.46 NG/ML (ref 0–4)
QRS AXIS: 41 DEGREES
QRSD INTERVAL: 88 MS
QT INTERVAL: 396 MS
QTC INTERVAL: 405 MS
RBC # BLD AUTO: 4.38 MILLION/UL (ref 3.88–5.62)
RH BLD: POSITIVE
SPECIMEN EXPIRATION DATE: NORMAL
T WAVE AXIS: -4 DEGREES
VENTRICULAR RATE: 63 BPM
WBC # BLD AUTO: 4.75 THOUSAND/UL (ref 4.31–10.16)

## 2024-02-19 PROCEDURE — 93005 ELECTROCARDIOGRAM TRACING: CPT

## 2024-02-19 PROCEDURE — 85025 COMPLETE CBC W/AUTO DIFF WBC: CPT

## 2024-02-19 PROCEDURE — 84153 ASSAY OF PSA TOTAL: CPT

## 2024-02-19 PROCEDURE — 36415 COLL VENOUS BLD VENIPUNCTURE: CPT

## 2024-02-19 PROCEDURE — 93010 ELECTROCARDIOGRAM REPORT: CPT | Performed by: INTERNAL MEDICINE

## 2024-02-19 PROCEDURE — 86850 RBC ANTIBODY SCREEN: CPT

## 2024-02-19 PROCEDURE — 86901 BLOOD TYPING SEROLOGIC RH(D): CPT

## 2024-02-19 PROCEDURE — 86900 BLOOD TYPING SEROLOGIC ABO: CPT

## 2024-02-20 ENCOUNTER — ANESTHESIA EVENT (OUTPATIENT)
Dept: PERIOP | Facility: HOSPITAL | Age: 64
End: 2024-02-20
Payer: COMMERCIAL

## 2024-02-21 PROBLEM — Z12.11 SCREENING FOR COLON CANCER: Status: RESOLVED | Noted: 2023-11-01 | Resolved: 2024-02-21

## 2024-02-27 NOTE — H&P
LILIANA Moore  Nurse Practitioner  Specialty: Urology     History and physical, updated by me 2/28/2024-plan transurethral resection of the prostate for 122 cc gland with BPH with obstruction and lower urinary tract symptoms.  The risks of bleeding infection urinary incontinence retrograde ejaculation and need for additional procedures were explained he gives informed consent.  Signed     Encounter Date: 12/19/2023     Signed       Expand All Collapse All    12/19/2023          Chief Complaint   Patient presents with    Follow-up        Follow-up evaluation to review prostate biopsy results. PVR and urine dip done today.         Assessment and Plan     63 y.o. male manage by Dr. Rodrigues     1.  Elevated PSA  S/p negative transperineal prostate biopsy by Dr. Rodrigues on 11/22/2023  Pre-biopsy PSA 5.06 July 2023.   PSA as high as 15.7 November 2022  Negative mpMRI October 2022  Large 122 cc prostate  Elevated PSA likely due to large prostate gland.  PSA in 3 months I will call him with those results     2.  BPH with LUTS  Was originally scheduled for TURP and transperineal prostate biopsy on 11/22/2023.  However he changes mind the day of the procedure and only wished to proceed with a transperineal prostate biopsy.  Still with bothersome lower urinary tract symptoms and elevated PVRs with PVR today of 320 mL.  Denies any abdominal pain, distention, or difficulty urinating.  Does complain of mild dysuria which she reports is mostly chronic.    Urine dip is negative for infection but we will send this for formal culture.  I again reviewed options regarding surgical treatment of his BPH.  I reviewed both TURP as well as greenlight laser TURP.  After discussion patient wishes to proceed with a TURP and I placed a case request for this.  Technique, benefits, and risk of the procedure listed above were discussed with them today and informed written consent was obtained.  All questions and concerns regarding surgery  "and perioperative expectations have been addressed and answered.  No overall changes in their health since last visit.  Denies any prior complications with anesthesia.   Continue Flomax and finasteride.  Patient declined [facility] teaching today.           History of Present Illness  Vnii Ybarra is a 63 y.o. male here for follow-up evaluation to review prostate biopsy results as well as to discuss TURP.        History of BPH with obstruction with large median lobe with incomplete bladder emptying.  Also has elevated PSA which the BPH is most likely the source.  He was started on Proscar and his PSA has dropped to 5.06 as of July 19, 2023.  PSA was 8.8 January 13, 2023, and was 15.7 November 14, 2022, 8.9 August 10, 2022.  He is status post negative transperineal prostate biopsy by Dr. Rodrigues on 11/22/2023.       In regards to his BPH, his symptoms are becoming worse despite the finasteride and the 0.8 mg total of Flomax.  He may have lost a little bit of libido with the finasteride.  During his last office visit with Dr. Rodrigues in July 2023 he discussed recommendations to proceed with a TURP.              Review of Systems   Constitutional:  Negative for chills and fever.   HENT:  Negative for congestion and sore throat.    Respiratory:  Negative for cough and shortness of breath.    Cardiovascular:  Negative for chest pain and leg swelling.   Gastrointestinal:  Negative for abdominal pain, constipation and diarrhea.   Genitourinary:  Positive for frequency and urgency. Negative for dysuria and hematuria.        Weak Urinary stream   Musculoskeletal:  Negative for back pain and gait problem.   Skin:  Negative for wound.   Allergic/Immunologic: Negative for immunocompromised state.   Hematological:  Does not bruise/bleed easily.                     Vitals  Vitals       Vitals:     12/19/23 0944   BP: 120/82   Pulse: 67   SpO2: 98%   Weight: 94.2 kg (207 lb 9.6 oz)   Height: 5' 10\" (1.778 m)            Physical " Exam  Vitals reviewed.   Constitutional:       General: He is not in acute distress.     Appearance: Normal appearance. He is not ill-appearing or toxic-appearing.   HENT:      Head: Normocephalic and atraumatic.   Eyes:      General: No scleral icterus.     Conjunctiva/sclera: Conjunctivae normal.   Cardiovascular:      Rate and Rhythm: Normal rate.   Pulmonary:      Effort: Pulmonary effort is normal. No respiratory distress.   Abdominal:      General: There is no distension.      Palpations: Abdomen is soft.      Tenderness: There is no abdominal tenderness. There is no right CVA tenderness or left CVA tenderness.      Hernia: No hernia is present.   Musculoskeletal:      Cervical back: Normal range of motion.      Right lower leg: No edema.      Left lower leg: No edema.   Skin:     General: Skin is warm and dry.      Coloration: Skin is not jaundiced or pale.   Neurological:      General: No focal deficit present.      Mental Status: He is alert and oriented to person, place, and time. Mental status is at baseline.      Gait: Gait normal.   Psychiatric:         Mood and Affect: Mood normal.         Behavior: Behavior normal.         Thought Content: Thought content normal.         Judgment: Judgment normal.            Past History  Medical History        Past Medical History:   Diagnosis Date    Chronic pain disorder      Spinal cord dysplasia (HCC)           Social History   Social History            Socioeconomic History    Marital status: Single       Spouse name: None    Number of children: None    Years of education: None    Highest education level: None   Occupational History    None   Tobacco Use    Smoking status: Former       Passive exposure: Past    Smokeless tobacco: Never   Vaping Use    Vaping status: Never Used   Substance and Sexual Activity    Alcohol use: Yes       Comment: occasionally    Drug use: Not Currently    Sexual activity: Not Currently   Other Topics Concern    None   Social History  Narrative    None      Social Determinants of Health           Financial Resource Strain: Low Risk  (9/29/2023)     Overall Financial Resource Strain (CARDIA)      Difficulty of Paying Living Expenses: Not hard at all   Food Insecurity: Not on file   Transportation Needs: No Transportation Needs (9/29/2023)     PRAPARE - Transportation      Lack of Transportation (Medical): No      Lack of Transportation (Non-Medical): No   Physical Activity: Not on file   Stress: Not on file   Social Connections: Not on file   Intimate Partner Violence: Not on file   Housing Stability: Not on file         Social History           Tobacco Use   Smoking Status Former    Passive exposure: Past   Smokeless Tobacco Never      Family History         Family History   Problem Relation Age of Onset    Cancer Father      Lung cancer Brother      Cancer Son              The following portions of the patient's history were reviewed and updated as appropriate allergies, current medications, past medical history, past social history, past surgical history and problem list     Imaging:     Results  Recent Results         Recent Results (from the past 1 hour(s))   POCT Measure PVR     Collection Time: 12/19/23 10:11 AM   Result Value Ref Range     POST-VOID RESIDUAL VOLUME, ML  mL   POCT urine dip     Collection Time: 12/19/23 10:11 AM   Result Value Ref Range     LEUKOCYTE ESTERASE,UA negative       NITRITE,UA negative       SL AMB POCT UROBILINOGEN 0.2       POCT URINE PROTEIN negative        PH,UA 6.0       BLOOD,UA negative       SPECIFIC GRAVITY,UA 1.010       KETONES,UA negative       BILIRUBIN,UA negative       GLUCOSE, UA negative        COLOR,UA yellow       CLARITY,UA clear        ]        Lab Results   Component Value Date     PSA 5.06 (H) 07/19/2023     PSA 8.8 (H) 01/13/2023     PSA 15.7 (H) 11/14/2022     PSA 8.9 (H) 08/10/2022            Lab Results   Component Value Date     GLUCOSE 89 06/07/2014     CALCIUM 9.2 09/29/2023  "     06/07/2014     K 4.5 09/29/2023     CO2 27 09/29/2023      09/29/2023     BUN 14 09/29/2023     CREATININE 1.13 09/29/2023            Lab Results   Component Value Date     WBC 4.51 09/29/2023     HGB 13.5 09/29/2023     HCT 41.4 09/29/2023     MCV 93 09/29/2023      09/29/2023         Please Note:  Voice dictation software has been used to create this document. There may be inadvertent transcriptions errors.      LILIANA Moore 12/19/23             Electronically signed by LILIANA Moore at 12/19/2023 10:22 AM         Office Visit on 12/19/2023          Note shared with patient  Additional Documentation    Vitals: /82     Pulse 67     Ht 5' 10\" (1.778 m)     Wt 94.2 kg (207 lb 9.6 oz)     SpO2 98%     BMI 29.79 kg/m²     BSA 2.12 m²   Flowsheets: PW Test Interface   SmartForms:  [facility] PRE-CHARTING       [facility] PCMH/PCSP WRAP UP REQUIREMENTS ADVANCED   Encounter Info: Billing Info,     History,     Allergies,     Detailed Report     Orders Placed      CBC and differential    PSA Total, Diagnostic    POCT Measure PVR    POCT urine dip    Type and screen    Urinalysis with microscopic    Urine culture    Case request operating room: TRANSURETHRAL RESECTION OF PROSTATE (TURP) Once    EKG 12 lead  All Encounter Results  Medication Changes      None  Medication List  Visit Diagnoses      Elevated PSA    Benign prostatic hyperplasia with incomplete bladder emptying  Problem List  "

## 2024-02-28 ENCOUNTER — ANESTHESIA (OUTPATIENT)
Dept: PERIOP | Facility: HOSPITAL | Age: 64
End: 2024-02-28
Payer: COMMERCIAL

## 2024-02-28 ENCOUNTER — HOSPITAL ENCOUNTER (OUTPATIENT)
Facility: HOSPITAL | Age: 64
Setting detail: OUTPATIENT SURGERY
Discharge: HOME | End: 2024-02-29
Attending: UROLOGY | Admitting: UROLOGY
Payer: COMMERCIAL

## 2024-02-28 ENCOUNTER — TELEPHONE (OUTPATIENT)
Dept: UROLOGY | Facility: CLINIC | Age: 64
End: 2024-02-28

## 2024-02-28 DIAGNOSIS — N40.1 BENIGN PROSTATIC HYPERPLASIA WITH INCOMPLETE BLADDER EMPTYING: ICD-10-CM

## 2024-02-28 DIAGNOSIS — R39.14 BENIGN PROSTATIC HYPERPLASIA WITH INCOMPLETE BLADDER EMPTYING: ICD-10-CM

## 2024-02-28 PROCEDURE — 94760 N-INVAS EAR/PLS OXIMETRY 1: CPT

## 2024-02-28 PROCEDURE — 88305 TISSUE EXAM BY PATHOLOGIST: CPT | Performed by: STUDENT IN AN ORGANIZED HEALTH CARE EDUCATION/TRAINING PROGRAM

## 2024-02-28 RX ORDER — MAGNESIUM HYDROXIDE/ALUMINUM HYDROXICE/SIMETHICONE 120; 1200; 1200 MG/30ML; MG/30ML; MG/30ML
30 SUSPENSION ORAL EVERY 6 HOURS PRN
Status: DISCONTINUED | OUTPATIENT
Start: 2024-02-28 | End: 2024-02-29 | Stop reason: HOSPADM

## 2024-02-28 RX ORDER — ONDANSETRON 2 MG/ML
4 INJECTION INTRAMUSCULAR; INTRAVENOUS ONCE AS NEEDED
Status: DISCONTINUED | OUTPATIENT
Start: 2024-02-28 | End: 2024-02-28 | Stop reason: HOSPADM

## 2024-02-28 RX ORDER — ONDANSETRON 2 MG/ML
4 INJECTION INTRAMUSCULAR; INTRAVENOUS EVERY 6 HOURS PRN
Status: DISCONTINUED | OUTPATIENT
Start: 2024-02-28 | End: 2024-02-29 | Stop reason: HOSPADM

## 2024-02-28 RX ORDER — ENOXAPARIN SODIUM 100 MG/ML
40 INJECTION SUBCUTANEOUS DAILY
Status: DISCONTINUED | OUTPATIENT
Start: 2024-02-28 | End: 2024-02-29 | Stop reason: HOSPADM

## 2024-02-28 RX ORDER — OXYCODONE HYDROCHLORIDE 5 MG/1
5 TABLET ORAL EVERY 4 HOURS PRN
Status: DISCONTINUED | OUTPATIENT
Start: 2024-02-28 | End: 2024-02-28

## 2024-02-28 RX ORDER — OXYCODONE HYDROCHLORIDE 5 MG/1
5 TABLET ORAL
Status: DISCONTINUED | OUTPATIENT
Start: 2024-02-28 | End: 2024-02-29 | Stop reason: HOSPADM

## 2024-02-28 RX ORDER — SORBITOL 30 G/1000ML
IRRIGANT IRRIGATION AS NEEDED
Status: DISCONTINUED | OUTPATIENT
Start: 2024-02-28 | End: 2024-02-28 | Stop reason: HOSPADM

## 2024-02-28 RX ORDER — SENNOSIDES 8.6 MG
1 TABLET ORAL DAILY
Status: DISCONTINUED | OUTPATIENT
Start: 2024-02-29 | End: 2024-02-29 | Stop reason: HOSPADM

## 2024-02-28 RX ORDER — PANTOPRAZOLE SODIUM 40 MG/1
40 TABLET, DELAYED RELEASE ORAL
Status: DISCONTINUED | OUTPATIENT
Start: 2024-02-29 | End: 2024-02-29 | Stop reason: HOSPADM

## 2024-02-28 RX ORDER — PHENAZOPYRIDINE HYDROCHLORIDE 200 MG/1
200 TABLET, FILM COATED ORAL 3 TIMES DAILY PRN
Qty: 30 TABLET | Refills: 0 | Status: SHIPPED | OUTPATIENT
Start: 2024-02-28

## 2024-02-28 RX ORDER — MAGNESIUM HYDROXIDE 1200 MG/15ML
3000 LIQUID ORAL CONTINUOUS
Status: DISCONTINUED | OUTPATIENT
Start: 2024-02-28 | End: 2024-02-29 | Stop reason: HOSPADM

## 2024-02-28 RX ORDER — CEFAZOLIN SODIUM 2 G/50ML
2000 SOLUTION INTRAVENOUS ONCE
Status: COMPLETED | OUTPATIENT
Start: 2024-02-28 | End: 2024-02-28

## 2024-02-28 RX ORDER — PROPOFOL 10 MG/ML
INJECTION, EMULSION INTRAVENOUS AS NEEDED
Status: DISCONTINUED | OUTPATIENT
Start: 2024-02-28 | End: 2024-02-28

## 2024-02-28 RX ORDER — ONDANSETRON 2 MG/ML
INJECTION INTRAMUSCULAR; INTRAVENOUS AS NEEDED
Status: DISCONTINUED | OUTPATIENT
Start: 2024-02-28 | End: 2024-02-28

## 2024-02-28 RX ORDER — DOCUSATE SODIUM 100 MG/1
100 CAPSULE, LIQUID FILLED ORAL 2 TIMES DAILY
Status: DISCONTINUED | OUTPATIENT
Start: 2024-02-28 | End: 2024-02-29 | Stop reason: HOSPADM

## 2024-02-28 RX ORDER — SODIUM CHLORIDE 450 MG/100ML
75 INJECTION, SOLUTION INTRAVENOUS CONTINUOUS
Status: DISCONTINUED | OUTPATIENT
Start: 2024-02-28 | End: 2024-02-29 | Stop reason: HOSPADM

## 2024-02-28 RX ORDER — HYDROMORPHONE HCL/PF 1 MG/ML
0.5 SYRINGE (ML) INJECTION
Status: DISCONTINUED | OUTPATIENT
Start: 2024-02-28 | End: 2024-02-28 | Stop reason: HOSPADM

## 2024-02-28 RX ORDER — CALCIUM CARBONATE 500 MG/1
1000 TABLET, CHEWABLE ORAL DAILY PRN
Status: DISCONTINUED | OUTPATIENT
Start: 2024-02-28 | End: 2024-02-29 | Stop reason: HOSPADM

## 2024-02-28 RX ORDER — NORTRIPTYLINE HYDROCHLORIDE 10 MG/1
20 CAPSULE ORAL
COMMUNITY
Start: 2024-02-15 | End: 2025-02-14

## 2024-02-28 RX ORDER — FINASTERIDE 5 MG/1
5 TABLET, FILM COATED ORAL DAILY
Status: DISCONTINUED | OUTPATIENT
Start: 2024-02-28 | End: 2024-02-29 | Stop reason: HOSPADM

## 2024-02-28 RX ORDER — FENTANYL CITRATE 50 UG/ML
INJECTION, SOLUTION INTRAMUSCULAR; INTRAVENOUS AS NEEDED
Status: DISCONTINUED | OUTPATIENT
Start: 2024-02-28 | End: 2024-02-28

## 2024-02-28 RX ORDER — DEXAMETHASONE SODIUM PHOSPHATE 10 MG/ML
INJECTION, SOLUTION INTRAMUSCULAR; INTRAVENOUS AS NEEDED
Status: DISCONTINUED | OUTPATIENT
Start: 2024-02-28 | End: 2024-02-28

## 2024-02-28 RX ORDER — TAMSULOSIN HYDROCHLORIDE 0.4 MG/1
0.8 CAPSULE ORAL DAILY
Status: DISCONTINUED | OUTPATIENT
Start: 2024-02-28 | End: 2024-02-29 | Stop reason: HOSPADM

## 2024-02-28 RX ORDER — CEFAZOLIN SODIUM 1 G/50ML
1000 SOLUTION INTRAVENOUS EVERY 8 HOURS
Qty: 100 ML | Refills: 0 | Status: COMPLETED | OUTPATIENT
Start: 2024-02-28 | End: 2024-02-29

## 2024-02-28 RX ORDER — SODIUM CHLORIDE, SODIUM LACTATE, POTASSIUM CHLORIDE, CALCIUM CHLORIDE 600; 310; 30; 20 MG/100ML; MG/100ML; MG/100ML; MG/100ML
INJECTION, SOLUTION INTRAVENOUS CONTINUOUS PRN
Status: DISCONTINUED | OUTPATIENT
Start: 2024-02-28 | End: 2024-02-28

## 2024-02-28 RX ORDER — MORPHINE SULFATE 4 MG/ML
4 INJECTION, SOLUTION INTRAMUSCULAR; INTRAVENOUS EVERY 2 HOUR PRN
Status: DISCONTINUED | OUTPATIENT
Start: 2024-02-28 | End: 2024-02-29 | Stop reason: HOSPADM

## 2024-02-28 RX ORDER — PHENAZOPYRIDINE HYDROCHLORIDE 100 MG/1
200 TABLET, FILM COATED ORAL 3 TIMES DAILY PRN
Status: DISCONTINUED | OUTPATIENT
Start: 2024-02-28 | End: 2024-02-29 | Stop reason: HOSPADM

## 2024-02-28 RX ORDER — GLYCOPYRROLATE 0.2 MG/ML
INJECTION INTRAMUSCULAR; INTRAVENOUS AS NEEDED
Status: DISCONTINUED | OUTPATIENT
Start: 2024-02-28 | End: 2024-02-28

## 2024-02-28 RX ORDER — CEPHALEXIN 500 MG/1
500 CAPSULE ORAL EVERY 6 HOURS SCHEDULED
Qty: 12 CAPSULE | Refills: 0 | Status: SHIPPED | OUTPATIENT
Start: 2024-02-28 | End: 2024-03-02

## 2024-02-28 RX ORDER — FENTANYL CITRATE/PF 50 MCG/ML
25 SYRINGE (ML) INJECTION
Status: DISCONTINUED | OUTPATIENT
Start: 2024-02-28 | End: 2024-02-28 | Stop reason: HOSPADM

## 2024-02-28 RX ORDER — PHENAZOPYRIDINE HYDROCHLORIDE 100 MG/1
100 TABLET, FILM COATED ORAL 3 TIMES DAILY PRN
Status: DISCONTINUED | OUTPATIENT
Start: 2024-02-28 | End: 2024-02-28

## 2024-02-28 RX ORDER — NORTRIPTYLINE HYDROCHLORIDE 10 MG/1
20 CAPSULE ORAL
Status: DISCONTINUED | OUTPATIENT
Start: 2024-02-28 | End: 2024-02-29 | Stop reason: HOSPADM

## 2024-02-28 RX ORDER — GABAPENTIN 300 MG/1
300 CAPSULE ORAL 3 TIMES DAILY PRN
Status: DISCONTINUED | OUTPATIENT
Start: 2024-02-28 | End: 2024-02-29 | Stop reason: HOSPADM

## 2024-02-28 RX ADMIN — CEFAZOLIN SODIUM 2000 MG: 2 SOLUTION INTRAVENOUS at 13:55

## 2024-02-28 RX ADMIN — FENTANYL CITRATE 50 MCG: 50 INJECTION, SOLUTION INTRAMUSCULAR; INTRAVENOUS at 13:57

## 2024-02-28 RX ADMIN — ONDANSETRON HYDROCHLORIDE 4 MG: 2 INJECTION, SOLUTION INTRAVENOUS at 15:28

## 2024-02-28 RX ADMIN — FENTANYL CITRATE 25 MCG: 50 INJECTION, SOLUTION INTRAMUSCULAR; INTRAVENOUS at 14:28

## 2024-02-28 RX ADMIN — FENTANYL CITRATE 25 MCG: 50 INJECTION, SOLUTION INTRAMUSCULAR; INTRAVENOUS at 14:18

## 2024-02-28 RX ADMIN — SODIUM CHLORIDE 75 ML/HR: 0.45 INJECTION, SOLUTION INTRAVENOUS at 17:06

## 2024-02-28 RX ADMIN — CEFAZOLIN SODIUM 1000 MG: 1 SOLUTION INTRAVENOUS at 21:24

## 2024-02-28 RX ADMIN — GLYCOPYRROLATE 0.2 MG: 0.2 INJECTION INTRAMUSCULAR; INTRAVENOUS at 13:57

## 2024-02-28 RX ADMIN — SODIUM CHLORIDE FOR IRRIGATION 3000 ML: 0.9 SOLUTION IRRIGATION at 17:06

## 2024-02-28 RX ADMIN — NORTRIPTYLINE HYDROCHLORIDE 20 MG: 10 CAPSULE ORAL at 21:24

## 2024-02-28 RX ADMIN — PHENAZOPYRIDINE 200 MG: 100 TABLET ORAL at 15:53

## 2024-02-28 RX ADMIN — PROPOFOL 170 MG: 10 INJECTION, EMULSION INTRAVENOUS at 13:57

## 2024-02-28 RX ADMIN — SODIUM CHLORIDE, SODIUM LACTATE, POTASSIUM CHLORIDE, AND CALCIUM CHLORIDE: .6; .31; .03; .02 INJECTION, SOLUTION INTRAVENOUS at 14:54

## 2024-02-28 RX ADMIN — ONDANSETRON HYDROCHLORIDE 4 MG: 2 INJECTION, SOLUTION INTRAVENOUS at 13:57

## 2024-02-28 RX ADMIN — ENOXAPARIN SODIUM 40 MG: 40 INJECTION SUBCUTANEOUS at 17:06

## 2024-02-28 RX ADMIN — OXYCODONE HYDROCHLORIDE 5 MG: 5 TABLET ORAL at 19:06

## 2024-02-28 RX ADMIN — DEXAMETHASONE SODIUM PHOSPHATE 10 MG: 10 INJECTION, SOLUTION INTRAMUSCULAR; INTRAVENOUS at 13:57

## 2024-02-28 RX ADMIN — FINASTERIDE 5 MG: 5 TABLET, FILM COATED ORAL at 17:06

## 2024-02-28 RX ADMIN — FENTANYL CITRATE 50 MCG: 50 INJECTION, SOLUTION INTRAMUSCULAR; INTRAVENOUS at 15:07

## 2024-02-28 RX ADMIN — TAMSULOSIN HYDROCHLORIDE 0.8 MG: 0.4 CAPSULE ORAL at 17:06

## 2024-02-28 RX ADMIN — SODIUM CHLORIDE, SODIUM LACTATE, POTASSIUM CHLORIDE, AND CALCIUM CHLORIDE: .6; .31; .03; .02 INJECTION, SOLUTION INTRAVENOUS at 13:53

## 2024-02-28 RX ADMIN — DOCUSATE SODIUM 100 MG: 100 CAPSULE, LIQUID FILLED ORAL at 17:06

## 2024-02-28 NOTE — DISCHARGE INSTR - AVS FIRST PAGE
Expect to see blood in the urine, and to experience urgency/frequency/burning with urination and dribbling.  This is normal after urological procedures.  Is also normal to experience some nausea after these procedures.  Go back your regular diet carefully.         Call for fever greater that 101.5, inability to urinate, prolonged nausea and vomiting, or severe pain not relieved by pain medications..     No driving/operating machinery for 24 hours, and while taking narcotics. Take over the counter remedy of choice to avoid constipation. Drink plenty of fluids.

## 2024-02-28 NOTE — ANESTHESIA POSTPROCEDURE EVALUATION
Post-Op Assessment Note    CV Status:  Stable  Pain Score: 0    Pain management: adequate       Mental Status:  Arousable and sleepy   Hydration Status:  Euvolemic   PONV Controlled:  Controlled   Airway Patency:  Patent     Post Op Vitals Reviewed: Yes    No anethesia notable event occurred.    Staff: CRNA               BP   172/67   Temp 97   Pulse 70   Resp 12   SpO2 99

## 2024-02-28 NOTE — ANESTHESIA PREPROCEDURE EVALUATION
Procedure:  TRANSURETHRAL RESECTION OF PROSTATE (TURP) (Bladder)    Relevant Problems   GI/HEPATIC   (+) Esophageal dysphagia   (+) Gastroesophageal reflux disease      MUSCULOSKELETAL   (+) Arthritis      NEURO/PSYCH   (+) Chronic hand pain, right      Spinal cord dysplasia  Cervical fusion    Physical Exam    Airway    Mallampati score: II  TM Distance: >3 FB       Dental       Cardiovascular  Cardiovascular exam normal    Pulmonary  Pulmonary exam normal     Other Findings        Anesthesia Plan  ASA Score- 3     Anesthesia Type- general with ASA Monitors.         Additional Monitors:     Airway Plan:            Plan Factors-    Chart reviewed. EKG reviewed.  Existing labs reviewed. Patient summary reviewed.                  Induction- intravenous.    Postoperative Plan-     Informed Consent- Anesthetic plan and risks discussed with patient.  I personally reviewed this patient with the CRNA. Discussed and agreed on the Anesthesia Plan with the CRNA..

## 2024-02-28 NOTE — QUICK NOTE
Patient underwent transurethral section of prostate-usual pathway, anticipate discharge tomorrow with Talavera catheter.  CBI overnight.  Prescriptions have been sent to the pharmacy, instructions are in epic and the office has been tasked to set him up for Friday morning catheter removal.

## 2024-02-28 NOTE — OP NOTE
OPERATIVE REPORT  PATIENT NAME: Vini Ybarra    :  1960  MRN: [id number]  Pt Location: [facility] OR ROOM 01    SURGERY DATE: 2024    Surgeons and Role:     * Luis Antonio Rodrigues MD - Primary    Preop Diagnosis:  Benign prostatic hyperplasia with incomplete bladder emptying [N40.1, R39.14]    Post-Op Diagnosis Codes:     * Benign prostatic hyperplasia with incomplete bladder emptying [N40.1, R39.14]    Procedure(s):  TRANSURETHRAL RESECTION OF PROSTATE (TURP)    Specimen(s):  * No specimens in log *    Estimated Blood Loss:   Minimal    Drains:  * No LDAs found *    Anesthesia Type:   General/LMA    Operative Indications:  Benign prostatic hyperplasia with incomplete bladder emptying [N40.1, R39.14]      Operative Findings:  Very large prostate, huge median lobe and anterior zone pushing into the bladder.  Very large resection.  Urine and irrigant completely clear at the end of the procedure.    Complications:   None    Procedure and Technique:  63-year-old man with incomplete bladder emptying and 122 cc gland with BPH with obstruction and lower urinary tract symptoms.  Has elevated PSA, status post negative transperineal prostate biopsy by me and negative multiparametric MRI.  ER's have been in the 320 range.  Offered TURP, and the risk of bleeding infection retrograde ejaculation incontinence and need for additional procedures were explained he gives informed consent.          The patient was brought to the operating room and identified. After general anesthesia was induced, the patient was prepared and draped in the dorsolithotomy position in the usual fashion, with standard care taken to protect pressure points. A time out was performed.       The 27 Persian resectoscope sheath was introduced into the bladder with the obturator/under direct vison. Quick Cystoscopy was peformed with the 30 degree lens. The bladder was trabeculated/nontrabeculated, and there was a very large median lobe and I almost had to point the  scope at the ceiling to overcome it.. The bladder was drained often during the procedure to avoid hyperdistension. The resectoscope was introduced, and resection begun at 6:00 to include the median lobe, and the prostate was resected circumferentially from the bladder neck to the verumontanum, with care being taken not to resect the Veru or resect distal to it. Once an adequate resection was obtained, all of the chips were evacuated with the Samanage evacuator, and hemostasis achieved with electrocautery.  There was a perforation at 9:00 near the bladder neck and the prostatic capsule.  When I was satisfied with hemostasis,a 24 [place] 3 way hematuria catheter was placed, the balloon inflated, and the catheter placed to slight traction. The patient was awakened and transferred to the PACU in good condition.    I was present for the entire procedure. and A qualified resident physician was not available.    Patient Disposition:  PACU  and extubated and stable        SIGNATURE: Luis Antonio Rodrigues MD  DATE: February 28, 2024  TIME: 12:29 PM

## 2024-02-28 NOTE — TELEPHONE ENCOUNTER
----- Message from Luis Antonio Rodrigues MD sent at 2/28/2024  3:47 PM EST -----  Patient underwent TURP today-please call him for Friday catheter removal in the morning.  Anticipate discharge tomorrow.

## 2024-02-28 NOTE — PLAN OF CARE
Problem: PAIN - ADULT  Goal: Verbalizes/displays adequate comfort level or baseline comfort level  Description: Interventions:  - Encourage patient to monitor pain and request assistance  - Assess pain using appropriate pain scale  - Administer analgesics based on type and severity of pain and evaluate response  - Implement non-pharmacological measures as appropriate and evaluate response  - Consider cultural and social influences on pain and pain management  - Notify physician/advanced practitioner if interventions unsuccessful or patient reports new pain  Outcome: Progressing     Problem: INFECTION - ADULT  Goal: Absence or prevention of progression during hospitalization  Description: INTERVENTIONS:  - Assess and monitor for signs and symptoms of infection  - Monitor lab/diagnostic results  - Monitor all insertion sites, i.e. indwelling lines, tubes, and drains  - Monitor endotracheal if appropriate and nasal secretions for changes in amount and color  - [place] appropriate cooling/warming therapies per order  - Administer medications as ordered  - Instruct and encourage patient and family to use good hand hygiene technique  - Identify and instruct in appropriate isolation precautions for identified infection/condition  Outcome: Progressing  Goal: Absence of fever/infection during neutropenic period  Description: INTERVENTIONS:  - Monitor WBC    Outcome: Progressing     Problem: SAFETY ADULT  Goal: Patient will remain free of falls  Description: INTERVENTIONS:  - Educate patient/family on patient safety including physical limitations  - Instruct patient to call for assistance with activity   - Consult OT/PT to assist with strengthening/mobility   - Keep Call bell within reach  - Keep bed low and locked with side rails adjusted as appropriate  - Keep care items and personal belongings within reach  - Initiate and maintain comfort rounds  - Make Fall Risk Sign visible to staff  - Offer Toileting every 2 Hours,  in advance of need  - Initiate/Maintain bed alarm  - Obtain necessary fall risk management equipment: non skid socks  - Apply yellow socks and bracelet for high fall risk patients  - Consider moving patient to room near nurses station  Outcome: Progressing  Goal: Maintain or return to baseline ADL function  Description: INTERVENTIONS:  -  Assess patient's ability to carry out ADLs; assess patient's baseline for ADL function and identify physical deficits which impact ability to perform ADLs (bathing, care of mouth/teeth, toileting, grooming, dressing, etc.)  - Assess/evaluate cause of self-care deficits   - Assess range of motion  - Assess patient's mobility; develop plan if impaired  - Assess patient's need for assistive devices and provide as appropriate  - Encourage maximum independence but intervene and supervise when necessary  - Involve family in performance of ADLs  - Assess for home care needs following discharge   - Consider OT consult to assist with ADL evaluation and planning for discharge  - Provide patient education as appropriate  Outcome: Progressing  Goal: Maintains/Returns to pre admission functional level  Description: INTERVENTIONS:  - Perform AM-PAC 6 Click Basic Mobility/ Daily Activity assessment daily.  - Set and communicate daily mobility goal to care team and patient/family/caregiver.   - Collaborate with rehabilitation services on mobility goals if consulted  - Perform Range of Motion 3 times a day.  - Reposition patient every 3 hours.  - Dangle patient 3 times a day  - Stand patient 3 times a day  - Ambulate patient 3 times a day  - Out of bed to chair 3 times a day   - Out of bed for meals 3 times a day  - Out of bed for toileting  - Record patient progress and toleration of activity level   Outcome: Progressing     Problem: DISCHARGE PLANNING  Goal: Discharge to home or other facility with appropriate resources  Description: INTERVENTIONS:  - Identify barriers to discharge w/patient and  caregiver  - Arrange for needed discharge resources and transportation as appropriate  - Identify discharge learning needs (meds, wound care, etc.)  - Arrange for interpretive services to assist at discharge as needed  - Refer to Case Management Department for coordinating discharge planning if the patient needs post-hospital services based on physician/advanced practitioner order or complex needs related to functional status, cognitive ability, or social support system  Outcome: Progressing     Problem: Knowledge Deficit  Goal: Patient/family/caregiver demonstrates understanding of disease process, treatment plan, medications, and discharge instructions  Description: Complete learning assessment and assess knowledge base.  Interventions:  - Provide teaching at level of understanding  - Provide teaching via preferred learning methods  Outcome: Progressing     Problem: GENITOURINARY - ADULT  Goal: Maintains or returns to baseline urinary function  Description: INTERVENTIONS:  - Assess urinary function  - Encourage oral fluids to ensure adequate hydration if ordered  - Administer IV fluids as ordered to ensure adequate hydration  - Administer ordered medications as needed  - Offer frequent toileting  - Follow urinary retention protocol if ordered  Outcome: Progressing  Goal: Absence of urinary retention  Description: INTERVENTIONS:  - Assess patient’s ability to void and empty bladder  - Monitor I/O  - Bladder scan as needed  - Discuss with physician/AP medications to alleviate retention as needed  - Discuss catheterization for long term situations as appropriate  Outcome: Progressing  Goal: Urinary catheter remains patent  Description: INTERVENTIONS:  - Assess patency of urinary catheter  - If patient has a chronic de jesus, consider changing catheter if non-functioning  - Follow guidelines for intermittent irrigation of non-functioning urinary catheter  Outcome: Progressing

## 2024-02-29 VITALS
DIASTOLIC BLOOD PRESSURE: 78 MMHG | OXYGEN SATURATION: 98 % | TEMPERATURE: 98.4 F | RESPIRATION RATE: 18 BRPM | WEIGHT: 183.42 LBS | HEIGHT: 70 IN | HEART RATE: 64 BPM | SYSTOLIC BLOOD PRESSURE: 166 MMHG | BODY MASS INDEX: 26.26 KG/M2

## 2024-02-29 PROCEDURE — 99024 POSTOP FOLLOW-UP VISIT: CPT | Performed by: UROLOGY

## 2024-02-29 RX ADMIN — SODIUM CHLORIDE 75 ML/HR: 0.45 INJECTION, SOLUTION INTRAVENOUS at 06:49

## 2024-02-29 RX ADMIN — SENNOSIDES 8.6 MG: 8.6 TABLET, FILM COATED ORAL at 08:03

## 2024-02-29 RX ADMIN — PANTOPRAZOLE SODIUM 40 MG: 40 TABLET, DELAYED RELEASE ORAL at 05:16

## 2024-02-29 RX ADMIN — CEFAZOLIN SODIUM 1000 MG: 1 SOLUTION INTRAVENOUS at 05:16

## 2024-02-29 RX ADMIN — TAMSULOSIN HYDROCHLORIDE 0.8 MG: 0.4 CAPSULE ORAL at 08:03

## 2024-02-29 RX ADMIN — FINASTERIDE 5 MG: 5 TABLET, FILM COATED ORAL at 08:03

## 2024-02-29 RX ADMIN — DOCUSATE SODIUM 100 MG: 100 CAPSULE, LIQUID FILLED ORAL at 08:03

## 2024-02-29 NOTE — CASE MANAGEMENT
Case Management Discharge Planning Note    Patient name Vini Ybarra  Location /-A MRN [id number]  : 1960 Date 2024       Current Admission Date: 2024  Current Admission Diagnosis:Benign prostatic hyperplasia with incomplete bladder emptying   Patient Active Problem List    Diagnosis Date Noted    Benign prostatic hyperplasia with incomplete bladder emptying 2024    Eosinophilic esophagitis 2024    Gastroesophageal reflux disease 2023    Epigastric pain 2023    Esophageal dysphagia 10/20/2023    Chronic hand pain, right 2023    Arthritis 2023    Myelomalacia of cervical cord (HCC) 2023    Myelopathy (HCC) 2023      LOS (days): 0  Geometric Mean LOS (GMLOS) (days):   Days to GMLOS:     OBJECTIVE:            Current admission status: Outpatient Surgery   Preferred Pharmacy:   [id number] - [place], [place] - [address] [place] [id number]  Phone: [phone] Fax: [phone]    Primary Care Provider: Gui Espinosa DO    [facility]  Secondary Insurance:     DISCHARGE DETAILS:    Pt is a probable dc home on this date with OP follow up after dc. Pt going home with fc and will follow up with Urology as an OP for fc removal on Friday 3/1.

## 2024-02-29 NOTE — PLAN OF CARE
Problem: PAIN - ADULT  Goal: Verbalizes/displays adequate comfort level or baseline comfort level  Description: Interventions:  - Encourage patient to monitor pain and request assistance  - Assess pain using appropriate pain scale  - Administer analgesics based on type and severity of pain and evaluate response  - Implement non-pharmacological measures as appropriate and evaluate response  - Consider cultural and social influences on pain and pain management  - Notify physician/advanced practitioner if interventions unsuccessful or patient reports new pain  Outcome: Progressing     Problem: INFECTION - ADULT  Goal: Absence or prevention of progression during hospitalization  Description: INTERVENTIONS:  - Assess and monitor for signs and symptoms of infection  - Monitor lab/diagnostic results  - Monitor all insertion sites, i.e. indwelling lines, tubes, and drains  - Monitor endotracheal if appropriate and nasal secretions for changes in amount and color  - [place] appropriate cooling/warming therapies per order  - Administer medications as ordered  - Instruct and encourage patient and family to use good hand hygiene technique  - Identify and instruct in appropriate isolation precautions for identified infection/condition  Outcome: Progressing  Goal: Absence of fever/infection during neutropenic period  Description: INTERVENTIONS:  - Monitor WBC    Outcome: Progressing     Problem: SAFETY ADULT  Goal: Patient will remain free of falls  Description: INTERVENTIONS:  - Educate patient/family on patient safety including physical limitations  - Instruct patient to call for assistance with activity   - Consult OT/PT to assist with strengthening/mobility   - Keep Call bell within reach  - Keep bed low and locked with side rails adjusted as appropriate  - Keep care items and personal belongings within reach  - Initiate and maintain comfort rounds  - Make Fall Risk Sign visible to staff  - Offer Toileting every 2 Hours,  in advance of need  - Initiate/Maintain bed/chair alarm  - Obtain necessary fall risk management equipment:   - Apply yellow socks and bracelet for high fall risk patients  - Consider moving patient to room near nurses station  Outcome: Progressing  Goal: Maintain or return to baseline ADL function  Description: INTERVENTIONS:  -  Assess patient's ability to carry out ADLs; assess patient's baseline for ADL function and identify physical deficits which impact ability to perform ADLs (bathing, care of mouth/teeth, toileting, grooming, dressing, etc.)  - Assess/evaluate cause of self-care deficits   - Assess range of motion  - Assess patient's mobility; develop plan if impaired  - Assess patient's need for assistive devices and provide as appropriate  - Encourage maximum independence but intervene and supervise when necessary  - Involve family in performance of ADLs  - Assess for home care needs following discharge   - Consider OT consult to assist with ADL evaluation and planning for discharge  - Provide patient education as appropriate  Outcome: Progressing  Goal: Maintains/Returns to pre admission functional level  Description: INTERVENTIONS:  - Perform AM-PAC 6 Click Basic Mobility/ Daily Activity assessment daily.  - Set and communicate daily mobility goal to care team and patient/family/caregiver.   - Collaborate with rehabilitation services on mobility goals if consulted  - Perform Range of Motion 3 times a day.  - Reposition patient every 2 hours.  - Dangle patient 3 times a day  - Stand patient 3 times a day  - Ambulate patient 3 times a day  - Out of bed to chair 3 times a day   - Out of bed for meals 3 times a day  - Out of bed for toileting  - Record patient progress and toleration of activity level   Outcome: Progressing     Problem: DISCHARGE PLANNING  Goal: Discharge to home or other facility with appropriate resources  Description: INTERVENTIONS:  - Identify barriers to discharge w/patient and  caregiver  - Arrange for needed discharge resources and transportation as appropriate  - Identify discharge learning needs (meds, wound care, etc.)  - Arrange for interpretive services to assist at discharge as needed  - Refer to Case Management Department for coordinating discharge planning if the patient needs post-hospital services based on physician/advanced practitioner order or complex needs related to functional status, cognitive ability, or social support system  Outcome: Progressing     Problem: Knowledge Deficit  Goal: Patient/family/caregiver demonstrates understanding of disease process, treatment plan, medications, and discharge instructions  Description: Complete learning assessment and assess knowledge base.  Interventions:  - Provide teaching at level of understanding  - Provide teaching via preferred learning methods  Outcome: Progressing     Problem: GENITOURINARY - ADULT  Goal: Maintains or returns to baseline urinary function  Description: INTERVENTIONS:  - Assess urinary function  - Encourage oral fluids to ensure adequate hydration if ordered  - Administer IV fluids as ordered to ensure adequate hydration  - Administer ordered medications as needed  - Offer frequent toileting  - Follow urinary retention protocol if ordered  Outcome: Progressing  Goal: Absence of urinary retention  Description: INTERVENTIONS:  - Assess patient’s ability to void and empty bladder  - Monitor I/O  - Bladder scan as needed  - Discuss with physician/AP medications to alleviate retention as needed  - Discuss catheterization for long term situations as appropriate  Outcome: Progressing  Goal: Urinary catheter remains patent  Description: INTERVENTIONS:  - Assess patency of urinary catheter  - If patient has a chronic de jesus, consider changing catheter if non-functioning  - Follow guidelines for intermittent irrigation of non-functioning urinary catheter  Outcome: Progressing

## 2024-02-29 NOTE — PROGRESS NOTES
Afebrile, vital signs stable.  Urine is completely clear.  Ate breakfast this morning without any problems.  Has been up out of bed.  Plan discharge this morning with follow-up tomorrow for catheter removal in the office.  Went over instructions with patient again.

## 2024-02-29 NOTE — TELEPHONE ENCOUNTER
Call placed to patient to follow up with him s/p TURP and schedule Talavera removal tomorrow morning. Unable to leave a message as VM box is full at this time. Will try again later this afternoon.

## 2024-02-29 NOTE — TELEPHONE ENCOUNTER
Post Op Note    Vini Ybarra is a 63 y.o. male s/p TRANSURETHRAL RESECTION OF PROSTATE (TURP) (Bladder)  performed 2/28/2024.  Vini Ybarra is a patient of Dr. Dr. Rodrigues and is seen at the [place] office.     How would you rate your pain on a scale from 1 to 10, 10 being the worst pain ever? 0    Have you had a fever? No    Have your bowel movements been regular? Has not had a BM since procedure- he was given Senna at the hospital and will use miralax or colace if needed     Do you have any difficulty urinating? De Jesus in place and draining     If the patient has a de jesus- are you comfortable caring for your de jesus? Yes Is it draining urine? Yes    Do you have any other questions or concerns that I can address at this time?     Patient reports doing well at this time. No pain, just some intermittent discomfort from De Jesus catheter. He reports some blood in the urine but no clots and De Jesus is draining without difficulty. Patient scheduled for De Jesus catheter removal in the [place] office tomorrow morning at 9:00 am.

## 2024-02-29 NOTE — RESPIRATORY THERAPY NOTE
RT Protocol Note  Vini Ybarra 63 y.o. male MRN: [id number]  Unit/Bed#: -A Encounter: [id number]    Assessment    Principal Problem:    Benign prostatic hyperplasia with incomplete bladder emptying      Home Pulmonary Medications:    Home Devices/Therapy: Other (Comment) (Patient denies home O2, denies any PAP therapy for SONA and denies the use of any bronchodilator chronically.)    Past Medical History:   Diagnosis Date    Arthritis     Chronic pain disorder     COVID 2021    GERD (gastroesophageal reflux disease)     Spinal cord dysplasia (HCC)      Social History     Socioeconomic History    Marital status: Single     Spouse name: None    Number of children: None    Years of education: None    Highest education level: None   Occupational History    None   Tobacco Use    Smoking status: Former     Passive exposure: Past    Smokeless tobacco: Never   Vaping Use    Vaping status: Never Used   Substance and Sexual Activity    Alcohol use: Yes     Comment: couple beers a week    Drug use: Not Currently    Sexual activity: Not Currently   Other Topics Concern    None   Social History Narrative    None     Social Determinants of Health     Financial Resource Strain: Low Risk  (9/29/2023)    Overall Financial Resource Strain (CARDIA)     Difficulty of Paying Living Expenses: Not hard at all   Food Insecurity: Not on file   Transportation Needs: No Transportation Needs (9/29/2023)    PRAPARE - Transportation     Lack of Transportation (Medical): No     Lack of Transportation (Non-Medical): No   Physical Activity: Not on file   Stress: Not on file   Social Connections: Not on file   Intimate Partner Violence: Not on file   Housing Stability: Not on file       Subjective     Patient assessed per RT protocol.  Patient is a post-op biopsy of lower bladder.  Upon my assessment, the patient's lung sounds are clear bilaterally, and the patient is in no respiratory distress.  He denies any prior breathing issues, denies any  "chronic use of bronchodilator therapy, denies any PAP therapy for SONA and denies wearing home O2.  He is currently on room air, SPO2 98%.  Will continue with post operative Incentive Spirometer and discontinue protocol.  Will gladly re assess if needed.     Objective    Physical Exam:   Assessment Type: Assess only  General Appearance: Alert, Awake  Respiratory Pattern: Normal  Chest Assessment: Chest expansion symmetrical  Bilateral Breath Sounds: Clear  Cough: None    Vitals:  Blood pressure 158/91, pulse 78, temperature 97.6 °F (36.4 °C), temperature source Tympanic, resp. rate 18, height 5' 10\" (1.778 m), weight 83.2 kg (183 lb 6.8 oz), SpO2 98%.          Imaging and other studies: I have personally reviewed pertinent reports.            Plan    Respiratory Plan: No distress/Pulmonary history, Discontinue Protocol  Airway Clearance Plan: Incentive Spirometer         "

## 2024-03-01 ENCOUNTER — PROCEDURE VISIT (OUTPATIENT)
Dept: UROLOGY | Facility: CLINIC | Age: 64
End: 2024-03-01

## 2024-03-01 VITALS
SYSTOLIC BLOOD PRESSURE: 130 MMHG | WEIGHT: 214 LBS | HEART RATE: 70 BPM | DIASTOLIC BLOOD PRESSURE: 76 MMHG | BODY MASS INDEX: 30.64 KG/M2 | HEIGHT: 70 IN

## 2024-03-01 DIAGNOSIS — N40.0 BENIGN PROSTATIC HYPERPLASIA, UNSPECIFIED WHETHER LOWER URINARY TRACT SYMPTOMS PRESENT: Primary | ICD-10-CM

## 2024-03-01 DIAGNOSIS — R33.9 INCOMPLETE BLADDER EMPTYING: ICD-10-CM

## 2024-03-01 PROCEDURE — 99024 POSTOP FOLLOW-UP VISIT: CPT

## 2024-03-01 NOTE — PROGRESS NOTES
"3/1/2024    Vini Ybarra is a 63 y.o. male  [id number]    Diagnosis: BPH with incomplete bladder emptying   Chief Complaint    Talavera removal post TURP         Patient presents for Talavera catheter  removal s/p TRANSURETHRAL RESECTION OF PROSTATE (TURP) (Bladder)  on 2/28/24 with Dr. Rodrigues    Plan:  Follow up with Dr. Rodrigues on 3/19/24 @ 2:30 pm in [place].     Patient aware to contact the office in the meantime with any issues urinating.     Procedure:  Talavera catheter removed after deflation of balloon. Patient tolerated well. Reviewed post op expectations with patient.     Vitals:    03/01/24 0909   BP: 130/76   Pulse: 70   Weight: 97.1 kg (214 lb)   Height: 5' 10\" (1.778 m)         Lazara De Oliveira RN   "

## 2024-03-05 PROCEDURE — 88305 TISSUE EXAM BY PATHOLOGIST: CPT | Performed by: STUDENT IN AN ORGANIZED HEALTH CARE EDUCATION/TRAINING PROGRAM

## 2024-03-19 ENCOUNTER — OFFICE VISIT (OUTPATIENT)
Dept: UROLOGY | Facility: CLINIC | Age: 64
End: 2024-03-19
Payer: COMMERCIAL

## 2024-03-19 VITALS
OXYGEN SATURATION: 97 % | DIASTOLIC BLOOD PRESSURE: 70 MMHG | WEIGHT: 213.4 LBS | HEART RATE: 80 BPM | SYSTOLIC BLOOD PRESSURE: 138 MMHG | BODY MASS INDEX: 30.55 KG/M2 | HEIGHT: 70 IN

## 2024-03-19 DIAGNOSIS — N40.0 BENIGN PROSTATIC HYPERPLASIA, UNSPECIFIED WHETHER LOWER URINARY TRACT SYMPTOMS PRESENT: ICD-10-CM

## 2024-03-19 DIAGNOSIS — R33.9 INCOMPLETE BLADDER EMPTYING: Primary | ICD-10-CM

## 2024-03-19 DIAGNOSIS — R97.20 ELEVATED PSA: ICD-10-CM

## 2024-03-19 LAB
POST-VOID RESIDUAL VOLUME, ML POC: 188 ML
SL AMB  POCT GLUCOSE, UA: NEGATIVE
SL AMB LEUKOCYTE ESTERASE,UA: NORMAL
SL AMB POCT BILIRUBIN,UA: NEGATIVE
SL AMB POCT BLOOD,UA: NORMAL
SL AMB POCT CLARITY,UA: CLEAR
SL AMB POCT COLOR,UA: YELLOW
SL AMB POCT KETONES,UA: NEGATIVE
SL AMB POCT NITRITE,UA: NEGATIVE
SL AMB POCT PH,UA: 6
SL AMB POCT SPECIFIC GRAVITY,UA: 1
SL AMB POCT URINE PROTEIN: NEGATIVE
SL AMB POCT UROBILINOGEN: 0.2

## 2024-03-19 PROCEDURE — 81002 URINALYSIS NONAUTO W/O SCOPE: CPT | Performed by: UROLOGY

## 2024-03-19 PROCEDURE — 99024 POSTOP FOLLOW-UP VISIT: CPT | Performed by: UROLOGY

## 2024-03-19 PROCEDURE — 51798 US URINE CAPACITY MEASURE: CPT | Performed by: UROLOGY

## 2024-03-19 NOTE — PROGRESS NOTES
UROLOGY PROGRESS NOTE   [facility]  [address]  [address]  [place] [address]  [phone]  Fax:[phone]  [email or web address]      NAME: Vini Ybarra  AGE: 63 y.o. SEX: male  : 1960   MRN: [id number]    DATE: 3/19/2024  TIME: 2:53 PM    Assessment and Plan:    BPH with obstruction status post TURP 2024-doing well.  The mild incontinence should resolve on its own.  Follow-up in 6 months with another PVR.  Pathology benign.  Recheck PSA at that time as well.  This is for elevated PSA.  They should drop after having the operation.  49 g resection.               Chief Complaint     Chief Complaint   Patient presents with    Follow-up     Post TURP Procedure: 2024. PVR and Urine dip done today.       History of Present Illness     BPH with obstruction: Status post TURP by me in 2024.  PVR is 188 cc.  It was 320 preop.  Urinalysis is suspected as having a TURP.  No sign of infection.  49 g resection, no malignancy.  He is doing well, much better stream and strong pressure behind it.  He currently wears a pad because he does have a little bit of leakage where all of a sudden he finds himself voiding a little bit when walking around and he gets some postvoid dribbling.  Hopefully this will resolve over the next couple of weeks.  He is drinking a lot of fluids, but his nocturia is still less at maybe 1 time per night.  Prior to the operation he was getting up 3 times per night.    The following portions of the patient's history were reviewed and updated as appropriate: allergies, current medications, past family history, past medical history, past social history, past surgical history and problem list.  Past Medical History:   Diagnosis Date    Arthritis     Chronic pain disorder     COVID     GERD (gastroesophageal reflux disease)     Spinal cord dysplasia (HCC)      Past Surgical History:   Procedure Laterality Date    CERVICAL FUSION      EGD    "   HERNIA REPAIR Left     NASAL SEPTOPLASTY W/ TURBINOPLASTY  08/22/2003    PA BX PROSTATE STRTCTC SATURATION SAMPLING IMG GID N/A 11/22/2023    Procedure: TRANSPERINEAL ULTRASOUND GUIDED BIOPSY PROSTATE;  Surgeon: Luis Antonio Rodrigues MD;  Location: [facility];  Service: Urology    PA TRURL ELECTROSURG RESCJ PROSTATE BLEED COMPLETE N/A 2/28/2024    Procedure: TRANSURETHRAL RESECTION OF PROSTATE (TURP);  Surgeon: Luis Antonio Rodrigues MD;  Location: [facility];  Service: Urology    TOOTH EXTRACTION       shoulder  Review of Systems   Review of Systems   Constitutional:  Negative for fever.   Gastrointestinal:  Negative for diarrhea.   Genitourinary:         As per HPI       Active Problem List     Patient Active Problem List   Diagnosis    Myelopathy (HCC)    Chronic hand pain, right    Arthritis    Myelomalacia of cervical cord (HCC)    Esophageal dysphagia    Gastroesophageal reflux disease    Epigastric pain    Eosinophilic esophagitis    Benign prostatic hyperplasia with incomplete bladder emptying       Objective   /70   Pulse 80   Ht 5' 10\" (1.778 m)   Wt 96.8 kg (213 lb 6.4 oz)   SpO2 97%   BMI 30.62 kg/m²     Physical Exam  Vitals reviewed.   Constitutional:       Appearance: Normal appearance.   HENT:      Head: Normocephalic and atraumatic.   Eyes:      Extraocular Movements: Extraocular movements intact.   Pulmonary:      Effort: Pulmonary effort is normal.   Musculoskeletal:         General: Normal range of motion.      Cervical back: Normal range of motion.   Skin:     Coloration: Skin is not jaundiced or pale.   Neurological:      General: No focal deficit present.      Mental Status: He is alert and oriented to person, place, and time. Mental status is at baseline.   Psychiatric:         Mood and Affect: Mood normal.         Behavior: Behavior normal.         Thought Content: Thought content normal.         Judgment: Judgment normal.             Current Medications     Current Outpatient Medications:     " finasteride (PROSCAR) 5 mg tablet, take 1 tablet by mouth once daily, Disp: 90 tablet, Rfl: 1    gabapentin (NEURONTIN) 300 mg capsule, Take 300 mg by mouth 3 (three) times a day as needed, Disp: , Rfl:     ibuprofen (MOTRIN) 800 mg tablet, ibuprofen 800 mg tablet, Disp: , Rfl:     NON FORMULARY, Take 1 capsule by mouth in the morning zyflamend, Disp: , Rfl:     nortriptyline (PAMELOR) 10 mg capsule, Take 20 mg by mouth, Disp: , Rfl:     omeprazole (PriLOSEC) 40 MG capsule, Take 1 capsule (40 mg total) by mouth daily, Disp: 30 capsule, Rfl: 5    oxyCODONE (OXY-IR) 5 MG capsule, Take 1 capsule (5 mg total) by mouth 2 (two) times a day as needed for moderate pain Max Daily Amount: 10 mg, Disp: 60 capsule, Rfl: 0    tamsulosin (FLOMAX) 0.4 mg, take 2 capsules by mouth IN THE MORNING, Disp: 180 capsule, Rfl: 1    Zinc 50 MG CAPS, Take by mouth, Disp: , Rfl:     cholecalciferol (VITAMIN D3) 1,000 units tablet, Take 1,000 Units by mouth daily (Patient not taking: Reported on 2/28/2024), Disp: , Rfl:     phenazopyridine (PYRIDIUM) 200 mg tablet, Take 1 tablet (200 mg total) by mouth 3 (three) times a day as needed for bladder spasms (Patient not taking: Reported on 3/1/2024), Disp: 30 tablet, Rfl: 0        Luis Antonio Rodrigues MD

## 2024-03-26 ENCOUNTER — RA CDI HCC (OUTPATIENT)
Dept: OTHER | Facility: HOSPITAL | Age: 64
End: 2024-03-26

## 2024-04-02 ENCOUNTER — OFFICE VISIT (OUTPATIENT)
Dept: FAMILY MEDICINE CLINIC | Facility: CLINIC | Age: 64
End: 2024-04-02
Payer: COMMERCIAL

## 2024-04-02 VITALS
WEIGHT: 211 LBS | HEART RATE: 63 BPM | SYSTOLIC BLOOD PRESSURE: 148 MMHG | BODY MASS INDEX: 30.21 KG/M2 | HEIGHT: 70 IN | OXYGEN SATURATION: 98 % | DIASTOLIC BLOOD PRESSURE: 82 MMHG

## 2024-04-02 DIAGNOSIS — N40.0 BPH WITHOUT OBSTRUCTION/LOWER URINARY TRACT SYMPTOMS: ICD-10-CM

## 2024-04-02 DIAGNOSIS — G95.89 MYELOMALACIA OF CERVICAL CORD (HCC): Primary | ICD-10-CM

## 2024-04-02 DIAGNOSIS — M19.90 ARTHRITIS: ICD-10-CM

## 2024-04-02 DIAGNOSIS — K20.0 EOSINOPHILIC ESOPHAGITIS: ICD-10-CM

## 2024-04-02 PROBLEM — R39.14 BENIGN PROSTATIC HYPERPLASIA WITH INCOMPLETE BLADDER EMPTYING: Chronic | Status: RESOLVED | Noted: 2024-02-28 | Resolved: 2024-04-02

## 2024-04-02 PROBLEM — N40.1 BENIGN PROSTATIC HYPERPLASIA WITH INCOMPLETE BLADDER EMPTYING: Chronic | Status: RESOLVED | Noted: 2024-02-28 | Resolved: 2024-04-02

## 2024-04-02 PROCEDURE — 99213 OFFICE O/P EST LOW 20 MIN: CPT | Performed by: INTERNAL MEDICINE

## 2024-04-02 PROCEDURE — G2211 COMPLEX E/M VISIT ADD ON: HCPCS | Performed by: INTERNAL MEDICINE

## 2024-04-02 NOTE — ASSESSMENT & PLAN NOTE
Currently on currently on omeprazole.  Has a reevaluation for persistent eosinophilic esophagitis, with possible progression to Dupixent.

## 2024-04-02 NOTE — ASSESSMENT & PLAN NOTE
Status post TUR and symptoms have greatly improved.  Biopsies negative for malignancy during the TUR.  Elevated PSAs are noted and he is being followed by urology regularly now.  He is now off his tamsulosin and finasteride

## 2024-04-02 NOTE — PROGRESS NOTES
Name: Vini Ybarra      : 1960      MRN: [id number]  Encounter Provider: Gui Espinosa DO  Encounter Date: 2024   Encounter department: [facility] PRIMARY CARE    Assessment & Plan     1. Myelomalacia of cervical cord (HCC)  Assessment & Plan:  MRIs suggest significant myelomalacia of the cervical cord following his fracture.  Continues to have symptoms including chronic pain in his cervical spine, and occasional radicular symptoms down both arms.  Continues on gabapentin 300 mg 3 times a day without ill side effect.      2. Eosinophilic esophagitis  Assessment & Plan:  Currently on currently on omeprazole.  Has a reevaluation for persistent eosinophilic esophagitis, with possible progression to Dupixent.      3. BPH without obstruction/lower urinary tract symptoms  Assessment & Plan:  Status post TUR and symptoms have greatly improved.  Biopsies negative for malignancy during the TUR.  Elevated PSAs are noted and he is being followed by urology regularly now.  He is now off his tamsulosin and finasteride      4. Arthritis  Assessment & Plan:  Takes occasional ibuprofen             Subjective      Is doing well.  Recently lost his mother was in the nursing home for extended period time.  He himself is not having much issue.  Continues with his intermittent neck pain with diminished range of motion.  Chronic pain related to his cervical fracture with significant cord injury.   Recent surgery noted with very a TUR for BPH with urinary obstruction.  This is resolved, urination is back on course.  No evidence of incontinence is noted.  Reviewed need for vaccines, which refuses all today.  Reluctant to get a new blood testing either.      Review of Systems   Constitutional:  Negative for chills and fever.   HENT:  Negative for rhinorrhea and sore throat.    Eyes:  Negative for visual disturbance.   Respiratory:  Negative for cough and shortness of breath.    Cardiovascular:  Positive for  "chest pain. Negative for leg swelling.   Gastrointestinal:  Negative for abdominal pain, diarrhea, nausea and vomiting.   Genitourinary:  Negative for dysuria.   Musculoskeletal:  Positive for arthralgias and neck pain. Negative for back pain and myalgias.   Skin:  Negative for rash.   Neurological:  Negative for dizziness and headaches.   Psychiatric/Behavioral:  Negative for confusion.    All other systems reviewed and are negative.      Current Outpatient Medications on File Prior to Visit   Medication Sig    cholecalciferol (VITAMIN D3) 1,000 units tablet Take 1,000 Units by mouth daily    gabapentin (NEURONTIN) 300 mg capsule Take 300 mg by mouth 3 (three) times a day as needed    ibuprofen (MOTRIN) 800 mg tablet ibuprofen 800 mg tablet    omeprazole (PriLOSEC) 40 MG capsule Take 1 capsule (40 mg total) by mouth daily    oxyCODONE (OXY-IR) 5 MG capsule Take 1 capsule (5 mg total) by mouth 2 (two) times a day as needed for moderate pain Max Daily Amount: 10 mg    Zinc 50 MG CAPS Take by mouth    [DISCONTINUED] finasteride (PROSCAR) 5 mg tablet take 1 tablet by mouth once daily (Patient not taking: Reported on 4/2/2024)    [DISCONTINUED] NON FORMULARY Take 1 capsule by mouth in the morning zyflamend (Patient not taking: Reported on 4/2/2024)    [DISCONTINUED] nortriptyline (PAMELOR) 10 mg capsule Take 20 mg by mouth (Patient not taking: Reported on 4/2/2024)    [DISCONTINUED] phenazopyridine (PYRIDIUM) 200 mg tablet Take 1 tablet (200 mg total) by mouth 3 (three) times a day as needed for bladder spasms (Patient not taking: Reported on 3/1/2024)    [DISCONTINUED] tamsulosin (FLOMAX) 0.4 mg take 2 capsules by mouth IN THE MORNING (Patient not taking: Reported on 4/2/2024)       Objective     /82 (BP Location: Left arm, Patient Position: Sitting, Cuff Size: Adult)   Pulse 63   Ht 5' 10\" (1.778 m)   Wt 95.7 kg (211 lb)   SpO2 98%   BMI 30.28 kg/m²     Physical Exam  Constitutional:       Appearance: " Normal appearance.   HENT:      Head: Normocephalic and atraumatic.      Nose: No congestion or rhinorrhea.   Eyes:      Extraocular Movements: Extraocular movements intact.      Pupils: Pupils are equal, round, and reactive to light.   Neck:      Vascular: No carotid bruit.   Cardiovascular:      Rate and Rhythm: Normal rate and regular rhythm.      Heart sounds: No murmur heard.  Pulmonary:      Effort: No respiratory distress.      Breath sounds: No wheezing.   Chest:      Chest wall: No tenderness.   Abdominal:      General: There is no distension.      Tenderness: There is no abdominal tenderness.      Hernia: No hernia is present.   Musculoskeletal:         General: Tenderness present. No swelling. Normal range of motion.      Cervical back: Normal range of motion.      Right lower leg: No edema.      Left lower leg: No edema.      Comments: Decreased ROM of the cervical spine   Lymphadenopathy:      Cervical: No cervical adenopathy.   Skin:     General: Skin is warm.      Findings: No rash.   Neurological:      General: No focal deficit present.      Mental Status: He is alert and oriented to person, place, and time.      Sensory: No sensory deficit.   Psychiatric:         Mood and Affect: Mood normal.         Behavior: Behavior normal.       Gui Espinosa DO

## 2024-04-02 NOTE — ASSESSMENT & PLAN NOTE
Continues with occasional epigastric and left upper quadrant pain, this is produced after eating typically.  Remains on omeprazole.  Endoscopy revealed esophagitis, will heed our next EGD

## 2024-04-02 NOTE — ASSESSMENT & PLAN NOTE
MRIs suggest significant myelomalacia of the cervical cord following his fracture.  Continues to have symptoms including chronic pain in his cervical spine, and occasional radicular symptoms down both arms.  Continues on gabapentin 300 mg 3 times a day without ill side effect.

## 2024-05-01 ENCOUNTER — ANESTHESIA (OUTPATIENT)
Dept: PERIOP | Facility: HOSPITAL | Age: 64
End: 2024-05-01

## 2024-05-01 ENCOUNTER — ANESTHESIA EVENT (OUTPATIENT)
Dept: PERIOP | Facility: HOSPITAL | Age: 64
End: 2024-05-01

## 2024-05-01 ENCOUNTER — HOSPITAL ENCOUNTER (OUTPATIENT)
Dept: PERIOP | Facility: HOSPITAL | Age: 64
Setting detail: OUTPATIENT SURGERY
Discharge: HOME | End: 2024-05-01
Payer: COMMERCIAL

## 2024-05-01 VITALS
TEMPERATURE: 97 F | HEART RATE: 63 BPM | BODY MASS INDEX: 30.21 KG/M2 | SYSTOLIC BLOOD PRESSURE: 121 MMHG | RESPIRATION RATE: 20 BRPM | OXYGEN SATURATION: 94 % | WEIGHT: 211 LBS | DIASTOLIC BLOOD PRESSURE: 61 MMHG | HEIGHT: 70 IN

## 2024-05-01 DIAGNOSIS — K20.0 EOSINOPHILIC ESOPHAGITIS: ICD-10-CM

## 2024-05-01 DIAGNOSIS — R13.19 ESOPHAGEAL DYSPHAGIA: ICD-10-CM

## 2024-05-01 DIAGNOSIS — K21.9 GASTROESOPHAGEAL REFLUX DISEASE, UNSPECIFIED WHETHER ESOPHAGITIS PRESENT: ICD-10-CM

## 2024-05-01 DIAGNOSIS — R10.13 EPIGASTRIC PAIN: ICD-10-CM

## 2024-05-01 PROCEDURE — 43239 EGD BIOPSY SINGLE/MULTIPLE: CPT | Performed by: STUDENT IN AN ORGANIZED HEALTH CARE EDUCATION/TRAINING PROGRAM

## 2024-05-01 PROCEDURE — 88305 TISSUE EXAM BY PATHOLOGIST: CPT | Performed by: STUDENT IN AN ORGANIZED HEALTH CARE EDUCATION/TRAINING PROGRAM

## 2024-05-01 RX ORDER — SODIUM CHLORIDE, SODIUM LACTATE, POTASSIUM CHLORIDE, CALCIUM CHLORIDE 600; 310; 30; 20 MG/100ML; MG/100ML; MG/100ML; MG/100ML
100 INJECTION, SOLUTION INTRAVENOUS CONTINUOUS
Status: CANCELLED | OUTPATIENT
Start: 2024-05-01

## 2024-05-01 RX ORDER — LIDOCAINE HYDROCHLORIDE 20 MG/ML
INJECTION, SOLUTION EPIDURAL; INFILTRATION; INTRACAUDAL; PERINEURAL AS NEEDED
Status: DISCONTINUED | OUTPATIENT
Start: 2024-05-01 | End: 2024-05-01

## 2024-05-01 RX ORDER — ALBUTEROL SULFATE 2.5 MG/3ML
2.5 SOLUTION RESPIRATORY (INHALATION) ONCE AS NEEDED
Status: CANCELLED | OUTPATIENT
Start: 2024-05-01

## 2024-05-01 RX ORDER — SODIUM CHLORIDE, SODIUM LACTATE, POTASSIUM CHLORIDE, CALCIUM CHLORIDE 600; 310; 30; 20 MG/100ML; MG/100ML; MG/100ML; MG/100ML
100 INJECTION, SOLUTION INTRAVENOUS CONTINUOUS
Status: DISCONTINUED | OUTPATIENT
Start: 2024-05-01 | End: 2024-05-05 | Stop reason: HOSPADM

## 2024-05-01 RX ORDER — ONDANSETRON 2 MG/ML
4 INJECTION INTRAMUSCULAR; INTRAVENOUS ONCE AS NEEDED
Status: CANCELLED | OUTPATIENT
Start: 2024-05-01

## 2024-05-01 RX ORDER — PROPOFOL 10 MG/ML
INJECTION, EMULSION INTRAVENOUS AS NEEDED
Status: DISCONTINUED | OUTPATIENT
Start: 2024-05-01 | End: 2024-05-01

## 2024-05-01 RX ADMIN — SODIUM CHLORIDE, SODIUM LACTATE, POTASSIUM CHLORIDE, AND CALCIUM CHLORIDE 100 ML/HR: .6; .31; .03; .02 INJECTION, SOLUTION INTRAVENOUS at 12:08

## 2024-05-01 RX ADMIN — LIDOCAINE HYDROCHLORIDE 100 MG: 20 INJECTION, SOLUTION EPIDURAL; INFILTRATION; INTRACAUDAL; PERINEURAL at 13:15

## 2024-05-01 RX ADMIN — PROPOFOL 50 MG: 10 INJECTION, EMULSION INTRAVENOUS at 13:24

## 2024-05-01 RX ADMIN — PROPOFOL 50 MG: 10 INJECTION, EMULSION INTRAVENOUS at 13:20

## 2024-05-01 RX ADMIN — SODIUM CHLORIDE, SODIUM LACTATE, POTASSIUM CHLORIDE, AND CALCIUM CHLORIDE: .6; .31; .03; .02 INJECTION, SOLUTION INTRAVENOUS at 12:46

## 2024-05-01 RX ADMIN — PROPOFOL 150 MG: 10 INJECTION, EMULSION INTRAVENOUS at 13:15

## 2024-05-01 NOTE — ANESTHESIA PREPROCEDURE EVALUATION
Medical History    History Comments   Spinal cord dysplasia (HCC)    Chronic pain disorder    COVID    GERD (gastroesophageal reflux disease)    Arthritis    Benign prostatic hyperplasia with incomplete bladder emptying    Procedure:  EGD    Relevant Problems   ANESTHESIA (within normal limits)      GI/HEPATIC   (+) Esophageal dysphagia   (+) Gastroesophageal reflux disease      /RENAL   (+) BPH without obstruction/lower urinary tract symptoms      MUSCULOSKELETAL   (+) Arthritis      NEURO/PSYCH   (+) Chronic hand pain, right        Physical Exam    Airway    Mallampati score: II  TM Distance: >3 FB  Neck ROM: full     Dental       Cardiovascular  Rate: normal    Pulmonary  Pulmonary exam normal     Other Findings  Per pt denies anything remaining that is loose or removeable      Anesthesia Plan  ASA Score- 3     Anesthesia Type- IV sedation with anesthesia with ASA Monitors.         Additional Monitors:     Airway Plan:            Plan Factors-Exercise tolerance (METS): >4 METS.    Chart reviewed.    Patient summary reviewed.    Patient is a current smoker.              Induction- intravenous.    Postoperative Plan-     Informed Consent- Anesthetic plan and risks discussed with patient.  I personally reviewed this patient with the CRNA. Discussed and agreed on the Anesthesia Plan with the CRNA..

## 2024-05-01 NOTE — H&P
[facility]  History & Physical     PATIENT INFO     Name: Vini Ybarra  YOB: 1960   Age: 63 y.o.   Sex: male   MRN: [id number]     HISTORY OF PRESENT ILLNESS     Vini Ybarra is a 63 y.o. year old male who presents for EGD for epigastric pain, dysphagia, history of EOE.  No antithrombotics or anticoagulants.     REVIEW OF SYSTEMS     Per the HPI, and otherwise unremarkable.    Historical Information   Past Medical History:   Diagnosis Date    Arthritis     Benign prostatic hyperplasia with incomplete bladder emptying 02/28/2024    Chronic pain disorder     COVID 2021    GERD (gastroesophageal reflux disease)     Spinal cord dysplasia (HCC)      Past Surgical History:   Procedure Laterality Date    CERVICAL FUSION      EGD      HERNIA REPAIR Left     NASAL SEPTOPLASTY W/ TURBINOPLASTY  08/22/2003    AR BX PROSTATE STRTCTC SATURATION SAMPLING IMG GID N/A 11/22/2023    Procedure: TRANSPERINEAL ULTRASOUND GUIDED BIOPSY PROSTATE;  Surgeon: Luis Antonio Rodrigues MD;  Location: [facility];  Service: Urology    AR TRURL ELECTROSURG RESCJ PROSTATE BLEED COMPLETE N/A 2/28/2024    Procedure: TRANSURETHRAL RESECTION OF PROSTATE (TURP);  Surgeon: Luis Antonio Rodrigues MD;  Location: [facility];  Service: Urology    TOOTH EXTRACTION       Social History   Social History     Substance and Sexual Activity   Alcohol Use Yes    Comment: couple beers a week     Social History     Substance and Sexual Activity   Drug Use Not Currently     Social History     Tobacco Use   Smoking Status Former    Current packs/day: 0.00    Types: Cigarettes    Quit date: 2014    Years since quitting: 10.3    Passive exposure: Past   Smokeless Tobacco Never     Family History   Problem Relation Age of Onset    Cancer Father     Lung cancer Brother     Cancer Son         MEDICATIONS & ALLERGIES     Current Outpatient Medications   Medication Instructions    cholecalciferol (VITAMIN D3) 1,000 Units, Oral, Daily    gabapentin  "(NEURONTIN) 300 mg, Oral, 3 times daily PRN    ibuprofen (MOTRIN) 800 mg tablet ibuprofen 800 mg tablet    omeprazole (PRILOSEC) 40 mg, Oral, Daily    oxyCODONE (OXY-IR) 5 mg, Oral, 2 times daily PRN    Zinc 50 MG CAPS Oral     Allergies   Allergen Reactions    Acetaminophen Other (See Comments)     Other reaction(s): TONGUE LIPS NOSE TINGLING SENSAT          PHYSICAL EXAM      Objective   Blood pressure 134/83, pulse 70, temperature 98.2 °F (36.8 °C), temperature source Tympanic, resp. rate 20, height 5' 10\" (1.778 m), weight 95.7 kg (211 lb), SpO2 94%. Body mass index is 30.28 kg/m².    General Appearance:   Alert, cooperative, no distress   Lungs:   Equal chest rise, respirations unlabored    Heart:   Regular rate and rhythm   Abdomen:   Soft, non-tender, non-distended; normal bowel sounds; no masses, no organomegaly    Extremities:   No edema       ASSESSMENT & PLAN     This is a 63 y.o. year old male here for EGD, and he is stable and optimized for his procedure.      Mickey Villa D.O.  [facility]  Division of Gastroenterology & Hepatology  Available on TigerText  [email or web address]    ** Please Note: This note is constructed using a voice recognition dictation system. **  "

## 2024-05-06 ENCOUNTER — OFFICE VISIT (OUTPATIENT)
Dept: URGENT CARE | Facility: CLINIC | Age: 64
End: 2024-05-06
Payer: COMMERCIAL

## 2024-05-06 VITALS
TEMPERATURE: 98.2 F | BODY MASS INDEX: 30.49 KG/M2 | WEIGHT: 213 LBS | HEART RATE: 69 BPM | OXYGEN SATURATION: 97 % | RESPIRATION RATE: 18 BRPM | DIASTOLIC BLOOD PRESSURE: 80 MMHG | HEIGHT: 70 IN | SYSTOLIC BLOOD PRESSURE: 130 MMHG

## 2024-05-06 DIAGNOSIS — S50.311A ABRASION OF RIGHT ELBOW, INITIAL ENCOUNTER: ICD-10-CM

## 2024-05-06 DIAGNOSIS — S50.861A TICK BITE OF RIGHT FOREARM, INITIAL ENCOUNTER: Primary | ICD-10-CM

## 2024-05-06 DIAGNOSIS — Z23 ENCOUNTER FOR IMMUNIZATION: ICD-10-CM
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
## 2024-05-06 PROCEDURE — 90715 TDAP VACCINE 7 YRS/> IM: CPT

## 2024-05-06 PROCEDURE — 99213 OFFICE O/P EST LOW 20 MIN: CPT | Performed by: PHYSICIAN ASSISTANT

## 2024-05-06 PROCEDURE — S9083 URGENT CARE CENTER GLOBAL: HCPCS | Performed by: PHYSICIAN ASSISTANT

## 2024-05-06 PROCEDURE — 90471 IMMUNIZATION ADMIN: CPT | Performed by: PHYSICIAN ASSISTANT

## 2024-05-06 RX ORDER — DOXYCYCLINE 100 MG/1
100 TABLET ORAL 2 TIMES DAILY
Qty: 2 TABLET | Refills: 0 | Status: SHIPPED | OUTPATIENT
Start: 2024-05-06 | End: 2024-05-07

## 2024-05-06 NOTE — PROGRESS NOTES
"  [facility]        NAME: Vini Ybarra is a 63 y.o. male  : 1960    MRN: [id number]  DATE: May 6, 2024  TIME: 6:05 PM    Assessment and Plan   Tick bite of right forearm, initial encounter [S50.861A, W57.XXXA]  1. Tick bite of right forearm, initial encounter  doxycycline (ADOXA) 100 MG tablet      2. Encounter for immunization  doxycycline (ADOXA) 100 MG tablet    Tdap Vaccine greater than or equal to 6yo      3. Abrasion of right elbow, initial encounter  Tdap Vaccine greater than or equal to 6yo        Updated TDAP.      Patient Instructions       Follow up with PCP at earliest availability.  Take medications as prescribed.  Proceed to  ER if symptoms worsen.    If tests have been performed at [facility], our office will contact you with results if changes need to be made to the care plan discussed with you at the visit.  You can review your full results on [facility].    Chief Complaint     Chief Complaint   Patient presents with    Tick Bite     He pulled it off Friday off right elbow. It is itchy and red.          History of Present Illness       Patient is a 63 year-old male presenting with a tick bite.  He noticed the tick on Friday and removed it. He has had a rash and \"swollen glands\" since then. He denies any new onset headache or joint swelling/pain. He does not have a history of lyme disease. He does not know when his last tetanus immunization was.     Tick Bite  This is a new problem. The current episode started in the past 7 days. The problem occurs constantly. The problem has been waxing and waning. Associated symptoms include a rash and swollen glands. Pertinent negatives include no abdominal pain, arthralgias, chest pain, chills, coughing, fever, joint swelling, myalgias, sore throat or vomiting.       Review of Systems   Review of Systems   Constitutional:  Negative for chills and fever.   HENT:  Negative for ear pain and sore throat.    Eyes:  Negative for pain and visual " disturbance.   Respiratory:  Negative for cough and shortness of breath.    Cardiovascular:  Negative for chest pain and palpitations.   Gastrointestinal:  Negative for abdominal pain and vomiting.   Genitourinary:  Negative for dysuria and hematuria.   Musculoskeletal:  Negative for arthralgias, back pain, joint swelling and myalgias.   Skin:  Positive for rash and wound. Negative for color change.   Neurological:  Negative for seizures and syncope.   All other systems reviewed and are negative.        Current Medications       Current Outpatient Medications:     cholecalciferol (VITAMIN D3) 1,000 units tablet, Take 1,000 Units by mouth daily, Disp: , Rfl:     doxycycline (ADOXA) 100 MG tablet, Take 1 tablet (100 mg total) by mouth 2 (two) times a day for 1 day, Disp: 2 tablet, Rfl: 0    gabapentin (NEURONTIN) 300 mg capsule, Take 300 mg by mouth 3 (three) times a day as needed, Disp: , Rfl:     ibuprofen (MOTRIN) 800 mg tablet, ibuprofen 800 mg tablet, Disp: , Rfl:     omeprazole (PriLOSEC) 40 MG capsule, Take 1 capsule (40 mg total) by mouth daily, Disp: 30 capsule, Rfl: 5    oxyCODONE (OXY-IR) 5 MG capsule, Take 1 capsule (5 mg total) by mouth 2 (two) times a day as needed for moderate pain Max Daily Amount: 10 mg, Disp: 60 capsule, Rfl: 0    Zinc 50 MG CAPS, Take by mouth (Patient not taking: Reported on 5/6/2024), Disp: , Rfl:     Current Allergies     Allergies as of 05/06/2024 - Reviewed 05/06/2024   Allergen Reaction Noted    Acetaminophen Other (See Comments) 03/24/2014            The following portions of the patient's history were reviewed and updated as appropriate: allergies, current medications, past family history, past medical history, past social history, past surgical history and problem list.     Past Medical History:   Diagnosis Date    Arthritis     Benign prostatic hyperplasia with incomplete bladder emptying 02/28/2024    Chronic pain disorder     COVID 2021    GERD (gastroesophageal reflux  "disease)     Spinal cord dysplasia (HCC)        Past Surgical History:   Procedure Laterality Date    CERVICAL FUSION      EGD      HERNIA REPAIR Left     NASAL SEPTOPLASTY W/ TURBINOPLASTY  08/22/2003    [place] BX PROSTATE STRTCTC SATURATION SAMPLING IMG GID N/A 11/22/2023    Procedure: TRANSPERINEAL ULTRASOUND GUIDED BIOPSY PROSTATE;  Surgeon: Luis Antonio Rodrigues MD;  Location: [facility];  Service: Urology    OH TRURL ELECTROSURG RESCJ PROSTATE BLEED COMPLETE N/A 2/28/2024    Procedure: TRANSURETHRAL RESECTION OF PROSTATE (TURP);  Surgeon: Luis Antonio Rodrigues MD;  Location: [facility];  Service: Urology    TOOTH EXTRACTION         Family History   Problem Relation Age of Onset    Cancer Father     Lung cancer Brother     Cancer Son          Medications have been verified.        Objective   /80   Pulse 69   Temp 98.2 °F (36.8 °C)   Resp 18   Ht 5' 10\" (1.778 m)   Wt 96.6 kg (213 lb)   SpO2 97%   BMI 30.56 kg/m²   No LMP for male patient.       Physical Exam     Physical Exam  Constitutional:       General: He is not in acute distress.     Appearance: He is normal weight. He is not ill-appearing, toxic-appearing or diaphoretic.   HENT:      Head:      Comments: Bilateral enlarged submandibular glands   Cardiovascular:      Rate and Rhythm: Normal rate and regular rhythm.      Heart sounds: No murmur heard.     No friction rub. No gallop.   Pulmonary:      Effort: Pulmonary effort is normal. No respiratory distress.      Breath sounds: No stridor. No wheezing, rhonchi or rales.   Musculoskeletal:        Arms:       Comments: Dime-sized erythematous rash on elbow                    "

## 2024-05-07 ENCOUNTER — OFFICE VISIT (OUTPATIENT)
Dept: FAMILY MEDICINE CLINIC | Facility: CLINIC | Age: 64
End: 2024-05-07
Payer: COMMERCIAL

## 2024-05-07 VITALS
OXYGEN SATURATION: 98 % | DIASTOLIC BLOOD PRESSURE: 94 MMHG | HEART RATE: 86 BPM | TEMPERATURE: 97.8 F | SYSTOLIC BLOOD PRESSURE: 146 MMHG | WEIGHT: 213 LBS | HEIGHT: 70 IN | BODY MASS INDEX: 30.49 KG/M2

## 2024-05-07 DIAGNOSIS — G95.89 MYELOMALACIA OF CERVICAL CORD (HCC): ICD-10-CM

## 2024-05-07 DIAGNOSIS — E78.2 MIXED HYPERLIPIDEMIA: ICD-10-CM

## 2024-05-07 DIAGNOSIS — S40.861D TICK BITE OF RIGHT UPPER ARM, SUBSEQUENT ENCOUNTER: Primary | ICD-10-CM

## 2024-05-07 DIAGNOSIS — Z11.4 SCREENING FOR HIV (HUMAN IMMUNODEFICIENCY VIRUS): ICD-10-CM

## 2024-05-07 DIAGNOSIS — R59.1 LYMPHADENOPATHY OF HEAD AND NECK: ICD-10-CM

## 2024-05-07 DIAGNOSIS — F11.20 CONTINUOUS OPIOID DEPENDENCE (HCC): ICD-10-CM

## 2024-05-07 DIAGNOSIS — Z11.59 NEED FOR HEPATITIS C SCREENING TEST: ICD-10-CM
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
## 2024-05-07 PROBLEM — S40.861A TICK BITE OF RIGHT UPPER ARM: Status: ACTIVE | Noted: 2024-05-07
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
## 2024-05-07 PROCEDURE — 99213 OFFICE O/P EST LOW 20 MIN: CPT | Performed by: INTERNAL MEDICINE

## 2024-05-07 PROCEDURE — G2211 COMPLEX E/M VISIT ADD ON: HCPCS | Performed by: INTERNAL MEDICINE

## 2024-05-07 RX ORDER — DOXYCYCLINE HYCLATE 100 MG/1
100 CAPSULE ORAL 2 TIMES DAILY
Qty: 14 CAPSULE | Refills: 0 | Status: SHIPPED | OUTPATIENT
Start: 2024-05-07 | End: 2024-05-14

## 2024-05-07 NOTE — PROGRESS NOTES
Name: Vini Ybarra      : 1960      MRN: [id number]  Encounter Provider: Gui Espinosa DO  Encounter Date: 2024   Encounter department: [facility] PRIMARY CARE    Assessment & Plan     1. Tick bite of right upper arm, subsequent encounter  Assessment & Plan:  Given patient was not dosed with 200 mg immediately, will give him 100 mg twice a day for complete course of 7 days.    Orders:  -     doxycycline hyclate (VIBRAMYCIN) 100 mg capsule; Take 1 capsule (100 mg total) by mouth 2 (two) times a day for 7 days  -     C-reactive protein; Future    2. Lymphadenopathy of head and neck  -     CBC and differential; Future  -     Comprehensive metabolic panel; Future  -     C-reactive protein; Future    3. Myelomalacia of cervical cord (HCC)  Assessment & Plan:  Chronic condition, recurrent pain.  Remains on oxycodone use very sparingly.      4. Continuous opioid dependence (HCC)  Assessment & Plan:  Continues to be compliant.  Pain management contract signed.      5. Need for hepatitis C screening test  -     Hepatitis C Antibody; Future    6. Screening for HIV (human immunodeficiency virus)  -     HIV 1/2 AG/AB w Reflex [facility] for 2 yr old and above; Future    7. Mixed hyperlipidemia  -     Lipid Panel with Direct LDL reflex; Future           Subjective      Patient seen in ER, had a tick removed.  Given appropriate doses of doxycycline however an adequate Sigg.  Also notes some swollen glands over the last 2 to 3 days.  Had a recent EGD with biopsies and we reviewed these.  Otherwise feels well.  Slight sore throat every day.  Chronic neck pain persist.      Review of Systems   Constitutional:  Negative for chills and fever.   HENT:  Positive for postnasal drip. Negative for ear pain, rhinorrhea and sore throat.         Swollen glands on his parotid area   Eyes:  Negative for pain, redness and visual disturbance.   Respiratory:  Negative for cough and shortness of breath.    Cardiovascular:   "Negative for chest pain and leg swelling.   Gastrointestinal:  Negative for abdominal pain, diarrhea, nausea and vomiting.   Genitourinary:  Negative for dysuria, flank pain, frequency and urgency.   Musculoskeletal:  Negative for back pain, myalgias and neck pain.   Skin:  Negative for rash.   Neurological:  Negative for dizziness, weakness, light-headedness and headaches.   Hematological: Negative.    Psychiatric/Behavioral:  Negative for agitation, confusion and suicidal ideas. The patient is not nervous/anxious.    All other systems reviewed and are negative.      Current Outpatient Medications on File Prior to Visit   Medication Sig    cholecalciferol (VITAMIN D3) 1,000 units tablet Take 1,000 Units by mouth daily    doxycycline (ADOXA) 100 MG tablet Take 1 tablet (100 mg total) by mouth 2 (two) times a day for 1 day    gabapentin (NEURONTIN) 300 mg capsule Take 300 mg by mouth 3 (three) times a day as needed    ibuprofen (MOTRIN) 800 mg tablet ibuprofen 800 mg tablet    omeprazole (PriLOSEC) 40 MG capsule Take 1 capsule (40 mg total) by mouth daily    oxyCODONE (OXY-IR) 5 MG capsule Take 1 capsule (5 mg total) by mouth 2 (two) times a day as needed for moderate pain Max Daily Amount: 10 mg    Zinc 50 MG CAPS Take by mouth (Patient not taking: Reported on 5/6/2024)       Objective     /94 (BP Location: Left arm, Patient Position: Sitting, Cuff Size: Adult)   Pulse 86   Temp 97.8 °F (36.6 °C) (Tympanic)   Ht 5' 10\" (1.778 m)   Wt 96.6 kg (213 lb)   SpO2 98%   BMI 30.56 kg/m²     Physical Exam  Constitutional:       Appearance: Normal appearance.   HENT:      Head: Normocephalic and atraumatic.      Nose: No congestion or rhinorrhea.   Eyes:      Extraocular Movements: Extraocular movements intact.      Pupils: Pupils are equal, round, and reactive to light.   Neck:      Vascular: No carotid bruit.      Comments: Mild submandibular and anterior chain cervical adenopathy.  Body in nature.  Freely " mobile.  Chronic decreased range of motion cervical spine.  Cardiovascular:      Rate and Rhythm: Normal rate and regular rhythm.      Heart sounds: No murmur heard.  Pulmonary:      Effort: No respiratory distress.      Breath sounds: No wheezing.   Chest:      Chest wall: No tenderness.   Abdominal:      General: There is no distension.      Tenderness: There is no abdominal tenderness.      Hernia: No hernia is present.   Musculoskeletal:         General: No swelling or tenderness.      Right lower leg: No edema.      Left lower leg: No edema.   Lymphadenopathy:      Cervical: Cervical adenopathy present.   Skin:     Findings: Rash present.      Comments: Just above right elbow, tick bite noted.  A 1 cm circumference erythematous area around the tick bite without central clearing.  No erythema.   Neurological:      General: No focal deficit present.      Mental Status: He is alert and oriented to person, place, and time.      Sensory: No sensory deficit.   Psychiatric:         Mood and Affect: Mood normal.         Behavior: Behavior normal.       Gui Espinosa, DO

## 2024-05-07 NOTE — ASSESSMENT & PLAN NOTE
Given patient was not dosed with 200 mg immediately, will give him 100 mg twice a day for complete course of 7 days.

## 2024-05-10 ENCOUNTER — TELEPHONE (OUTPATIENT)
Dept: PULMONOLOGY | Facility: CLINIC | Age: 64
End: 2024-05-10

## 2024-05-10 NOTE — TELEPHONE ENCOUNTER
----- Message from Mickey Villa DO sent at 5/10/2024  2:20 PM EDT -----  Hi,    Please inform patient that stomach biopsies were negative for H. Pylori infection.  Esophageal biopsies were negative for EOE.    Recommend continuing medications as directed and following up with AP in the office.    Thanks.    Mickey Villa D.O.  [facility]  Division of Gastroenterology & Hepatology  Available on TigerText  [email or web address]

## 2024-05-10 NOTE — TELEPHONE ENCOUNTER
Called and gave tissue results to Vini. He understood and scheduled office follow up per recommendation.

## 2024-05-17 DIAGNOSIS — G95.9 MYELOPATHY (HCC): ICD-10-CM

## 2024-05-17 RX ORDER — OXYCODONE HYDROCHLORIDE 5 MG/1
5 CAPSULE ORAL 2 TIMES DAILY PRN
Qty: 60 CAPSULE | Refills: 0 | Status: SHIPPED | OUTPATIENT
Start: 2024-05-17

## 2024-05-17 NOTE — TELEPHONE ENCOUNTER
Pt calling requesting refill of oxycodone (oxy-ir) 5 mg     Sent to [facility]

## 2024-05-20 ENCOUNTER — TELEPHONE (OUTPATIENT)
Age: 64
End: 2024-05-20

## 2024-05-20 NOTE — TELEPHONE ENCOUNTER
PA for Oxycodone 5mg tablets    Submitted via    []CMM-KEY   [x][employer]-Case ID # [id number]   []Faxed to plan   []Other website   []Phone call Case ID #     Office notes sent, clinical questions answered. Awaiting determination    Turnaround time for your insurance to make a decision on your Prior Authorization can take 7-21 business days.

## 2024-05-21 ENCOUNTER — APPOINTMENT (OUTPATIENT)
Dept: LAB | Facility: HOSPITAL | Age: 64
End: 2024-05-21
Payer: COMMERCIAL

## 2024-05-21 DIAGNOSIS — E78.2 MIXED HYPERLIPIDEMIA: ICD-10-CM

## 2024-05-21 DIAGNOSIS — Z11.4 SCREENING FOR HIV (HUMAN IMMUNODEFICIENCY VIRUS): ICD-10-CM

## 2024-05-21 DIAGNOSIS — R59.1 LYMPHADENOPATHY OF HEAD AND NECK: ICD-10-CM

## 2024-05-21 DIAGNOSIS — Z11.59 NEED FOR HEPATITIS C SCREENING TEST: ICD-10-CM

## 2024-05-21 DIAGNOSIS — S40.861D TICK BITE OF RIGHT UPPER ARM, SUBSEQUENT ENCOUNTER: ICD-10-CM
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
## 2024-05-21 LAB
ALBUMIN SERPL BCP-MCNC: 4.4 G/DL (ref 3.5–5)
ALP SERPL-CCNC: 59 U/L (ref 34–104)
ALT SERPL W P-5'-P-CCNC: 23 U/L (ref 7–52)
ANION GAP SERPL CALCULATED.3IONS-SCNC: 7 MMOL/L (ref 4–13)
AST SERPL W P-5'-P-CCNC: 23 U/L (ref 13–39)
BASOPHILS # BLD AUTO: 0.08 THOUSANDS/ÂΜL (ref 0–0.1)
BASOPHILS NFR BLD AUTO: 1 % (ref 0–1)
BILIRUB SERPL-MCNC: 0.54 MG/DL (ref 0.2–1)
BUN SERPL-MCNC: 13 MG/DL (ref 5–25)
CALCIUM SERPL-MCNC: 9.5 MG/DL (ref 8.4–10.2)
CHLORIDE SERPL-SCNC: 104 MMOL/L (ref 96–108)
CO2 SERPL-SCNC: 29 MMOL/L (ref 21–32)
CREAT SERPL-MCNC: 1.1 MG/DL (ref 0.6–1.3)
CRP SERPL QL: 1.2 MG/L
EOSINOPHIL # BLD AUTO: 0.32 THOUSAND/ÂΜL (ref 0–0.61)
EOSINOPHIL NFR BLD AUTO: 6 % (ref 0–6)
ERYTHROCYTE [DISTWIDTH] IN BLOOD BY AUTOMATED COUNT: 12.9 % (ref 11.6–15.1)
GFR SERPL CREATININE-BSD FRML MDRD: 70 ML/MIN/1.73SQ M
GLUCOSE P FAST SERPL-MCNC: 103 MG/DL (ref 65–99)
HCT VFR BLD AUTO: 41.5 % (ref 36.5–49.3)
HGB BLD-MCNC: 13.9 G/DL (ref 12–17)
IMM GRANULOCYTES # BLD AUTO: 0.02 THOUSAND/UL (ref 0–0.2)
IMM GRANULOCYTES NFR BLD AUTO: 0 % (ref 0–2)
LYMPHOCYTES # BLD AUTO: 1.76 THOUSANDS/ÂΜL (ref 0.6–4.47)
LYMPHOCYTES NFR BLD AUTO: 30 % (ref 14–44)
MCH RBC QN AUTO: 30.5 PG (ref 26.8–34.3)
MCHC RBC AUTO-ENTMCNC: 33.5 G/DL (ref 31.4–37.4)
MCV RBC AUTO: 91 FL (ref 82–98)
MONOCYTES # BLD AUTO: 0.52 THOUSAND/ÂΜL (ref 0.17–1.22)
MONOCYTES NFR BLD AUTO: 9 % (ref 4–12)
NEUTROPHILS # BLD AUTO: 3.11 THOUSANDS/ÂΜL (ref 1.85–7.62)
NEUTS SEG NFR BLD AUTO: 54 % (ref 43–75)
NRBC BLD AUTO-RTO: 0 /100 WBCS
PLATELET # BLD AUTO: 214 THOUSANDS/UL (ref 149–390)
PMV BLD AUTO: 10.2 FL (ref 8.9–12.7)
POTASSIUM SERPL-SCNC: 4.3 MMOL/L (ref 3.5–5.3)
PROT SERPL-MCNC: 7.2 G/DL (ref 6.4–8.4)
RBC # BLD AUTO: 4.56 MILLION/UL (ref 3.88–5.62)
SODIUM SERPL-SCNC: 140 MMOL/L (ref 135–147)
WBC # BLD AUTO: 5.81 THOUSAND/UL (ref 4.31–10.16)

## 2024-05-21 PROCEDURE — 85025 COMPLETE CBC W/AUTO DIFF WBC: CPT

## 2024-05-21 PROCEDURE — 80061 LIPID PANEL: CPT

## 2024-05-21 PROCEDURE — 36415 COLL VENOUS BLD VENIPUNCTURE: CPT

## 2024-05-21 PROCEDURE — 87389 HIV-1 AG W/HIV-1&-2 AB AG IA: CPT

## 2024-05-21 PROCEDURE — 86140 C-REACTIVE PROTEIN: CPT

## 2024-05-21 PROCEDURE — 80053 COMPREHEN METABOLIC PANEL: CPT

## 2024-05-21 PROCEDURE — 86803 HEPATITIS C AB TEST: CPT

## 2024-05-22 DIAGNOSIS — E78.2 MIXED HYPERLIPIDEMIA: Primary | ICD-10-CM

## 2024-05-22 LAB
CHOLEST SERPL-MCNC: 232 MG/DL
HCV AB SER QL: NORMAL
HDLC SERPL-MCNC: 36 MG/DL
HIV 1+2 AB+HIV1 P24 AG SERPL QL IA: NORMAL
HIV 2 AB SERPL QL IA: NORMAL
HIV1 AB SERPL QL IA: NORMAL
HIV1 P24 AG SERPL QL IA: NORMAL
LDLC SERPL CALC-MCNC: 139 MG/DL (ref 0–100)
TRIGL SERPL-MCNC: 284 MG/DL

## 2024-05-22 RX ORDER — ROSUVASTATIN CALCIUM 5 MG/1
5 TABLET, COATED ORAL DAILY
Qty: 90 TABLET | Refills: 3 | Status: SHIPPED | OUTPATIENT
Start: 2024-05-22

## 2024-06-23 ENCOUNTER — HOSPITAL ENCOUNTER (INPATIENT)
Facility: HOSPITAL | Age: 64
LOS: 2 days | Discharge: HOME | DRG: 175 | End: 2024-06-25
Attending: EMERGENCY MEDICINE | Admitting: FAMILY MEDICINE
Payer: COMMERCIAL

## 2024-06-23 ENCOUNTER — APPOINTMENT (EMERGENCY)
Dept: CT IMAGING | Facility: HOSPITAL | Age: 64
DRG: 175 | End: 2024-06-23
Payer: COMMERCIAL

## 2024-06-23 ENCOUNTER — APPOINTMENT (EMERGENCY)
Dept: RADIOLOGY | Facility: HOSPITAL | Age: 64
DRG: 175 | End: 2024-06-23
Payer: COMMERCIAL

## 2024-06-23 DIAGNOSIS — R07.81 PLEURITIC CHEST PAIN: ICD-10-CM

## 2024-06-23 DIAGNOSIS — I26.94 MULTIPLE SUBSEGMENTAL PULMONARY EMBOLI WITHOUT ACUTE COR PULMONALE (HCC): Primary | ICD-10-CM

## 2024-06-23 DIAGNOSIS — J18.9 LEFT LOWER LOBE PNEUMONIA: ICD-10-CM

## 2024-06-23 PROBLEM — I26.99 BILATERAL PULMONARY EMBOLISM (HCC): Status: ACTIVE | Noted: 2024-06-23

## 2024-06-23 LAB
2HR DELTA HS TROPONIN: 0 NG/L
4HR DELTA HS TROPONIN: 0 NG/L
ALBUMIN SERPL BCG-MCNC: 4.6 G/DL (ref 3.5–5)
ALP SERPL-CCNC: 63 U/L (ref 34–104)
ALT SERPL W P-5'-P-CCNC: 21 U/L (ref 7–52)
ANION GAP SERPL CALCULATED.3IONS-SCNC: 10 MMOL/L (ref 4–13)
APTT PPP: 110 SECONDS (ref 23–37)
APTT PPP: 28 SECONDS (ref 23–37)
AST SERPL W P-5'-P-CCNC: 15 U/L (ref 13–39)
BASOPHILS # BLD AUTO: 0.05 THOUSANDS/ÂΜL (ref 0–0.1)
BASOPHILS NFR BLD AUTO: 1 % (ref 0–1)
BILIRUB SERPL-MCNC: 0.79 MG/DL (ref 0.2–1)
BNP SERPL-MCNC: 9 PG/ML (ref 0–100)
BUN SERPL-MCNC: 15 MG/DL (ref 5–25)
CALCIUM SERPL-MCNC: 9.7 MG/DL (ref 8.4–10.2)
CARDIAC TROPONIN I PNL SERPL HS: 3 NG/L
CHLORIDE SERPL-SCNC: 102 MMOL/L (ref 96–108)
CO2 SERPL-SCNC: 27 MMOL/L (ref 21–32)
CREAT SERPL-MCNC: 1.21 MG/DL (ref 0.6–1.3)
D DIMER PPP FEU-MCNC: 0.8 UG/ML FEU
DEPRECATED AT III PPP: 118 % OF NORMAL (ref 92–136)
EOSINOPHIL # BLD AUTO: 0.28 THOUSAND/ÂΜL (ref 0–0.61)
EOSINOPHIL NFR BLD AUTO: 4 % (ref 0–6)
ERYTHROCYTE [DISTWIDTH] IN BLOOD BY AUTOMATED COUNT: 13.1 % (ref 11.6–15.1)
ERYTHROCYTE [DISTWIDTH] IN BLOOD BY AUTOMATED COUNT: 13.1 % (ref 11.6–15.1)
GFR SERPL CREATININE-BSD FRML MDRD: 62 ML/MIN/1.73SQ M
GLUCOSE SERPL-MCNC: 110 MG/DL (ref 65–140)
HCT VFR BLD AUTO: 38.7 % (ref 36.5–49.3)
HCT VFR BLD AUTO: 39.5 % (ref 36.5–49.3)
HGB BLD-MCNC: 12.9 G/DL (ref 12–17)
HGB BLD-MCNC: 13.2 G/DL (ref 12–17)
IMM GRANULOCYTES # BLD AUTO: 0.03 THOUSAND/UL (ref 0–0.2)
IMM GRANULOCYTES NFR BLD AUTO: 0 % (ref 0–2)
INR PPP: 1.03 (ref 0.84–1.19)
LIPASE SERPL-CCNC: 18 U/L (ref 11–82)
LYMPHOCYTES # BLD AUTO: 2.09 THOUSANDS/ÂΜL (ref 0.6–4.47)
LYMPHOCYTES NFR BLD AUTO: 28 % (ref 14–44)
MCH RBC QN AUTO: 30.3 PG (ref 26.8–34.3)
MCH RBC QN AUTO: 30.3 PG (ref 26.8–34.3)
MCHC RBC AUTO-ENTMCNC: 33.3 G/DL (ref 31.4–37.4)
MCHC RBC AUTO-ENTMCNC: 33.4 G/DL (ref 31.4–37.4)
MCV RBC AUTO: 91 FL (ref 82–98)
MCV RBC AUTO: 91 FL (ref 82–98)
MONOCYTES # BLD AUTO: 0.71 THOUSAND/ÂΜL (ref 0.17–1.22)
MONOCYTES NFR BLD AUTO: 10 % (ref 4–12)
NEUTROPHILS # BLD AUTO: 4.31 THOUSANDS/ÂΜL (ref 1.85–7.62)
NEUTS SEG NFR BLD AUTO: 57 % (ref 43–75)
NRBC BLD AUTO-RTO: 0 /100 WBCS
PLATELET # BLD AUTO: 192 THOUSANDS/UL (ref 149–390)
PLATELET # BLD AUTO: 201 THOUSANDS/UL (ref 149–390)
PMV BLD AUTO: 10.2 FL (ref 8.9–12.7)
PMV BLD AUTO: 10.3 FL (ref 8.9–12.7)
POTASSIUM SERPL-SCNC: 4 MMOL/L (ref 3.5–5.3)
PROT SERPL-MCNC: 7.6 G/DL (ref 6.4–8.4)
PROTHROMBIN TIME: 13.4 SECONDS (ref 11.6–14.5)
RBC # BLD AUTO: 4.26 MILLION/UL (ref 3.88–5.62)
RBC # BLD AUTO: 4.35 MILLION/UL (ref 3.88–5.62)
SODIUM SERPL-SCNC: 139 MMOL/L (ref 135–147)
WBC # BLD AUTO: 7.47 THOUSAND/UL (ref 4.31–10.16)
WBC # BLD AUTO: 9.08 THOUSAND/UL (ref 4.31–10.16)

## 2024-06-23 PROCEDURE — 85379 FIBRIN DEGRADATION QUANT: CPT | Performed by: EMERGENCY MEDICINE

## 2024-06-23 PROCEDURE — 85610 PROTHROMBIN TIME: CPT | Performed by: EMERGENCY MEDICINE

## 2024-06-23 PROCEDURE — 85303 CLOT INHIBIT PROT C ACTIVITY: CPT | Performed by: EMERGENCY MEDICINE

## 2024-06-23 PROCEDURE — 86146 BETA-2 GLYCOPROTEIN ANTIBODY: CPT | Performed by: EMERGENCY MEDICINE

## 2024-06-23 PROCEDURE — 99285 EMERGENCY DEPT VISIT HI MDM: CPT

## 2024-06-23 PROCEDURE — 74177 CT ABD & PELVIS W/CONTRAST: CPT

## 2024-06-23 PROCEDURE — 85613 RUSSELL VIPER VENOM DILUTED: CPT | Performed by: EMERGENCY MEDICINE

## 2024-06-23 PROCEDURE — 85732 THROMBOPLASTIN TIME PARTIAL: CPT | Performed by: EMERGENCY MEDICINE

## 2024-06-23 PROCEDURE — 83690 ASSAY OF LIPASE: CPT | Performed by: EMERGENCY MEDICINE

## 2024-06-23 PROCEDURE — 85705 THROMBOPLASTIN INHIBITION: CPT | Performed by: EMERGENCY MEDICINE

## 2024-06-23 PROCEDURE — 85300 ANTITHROMBIN III ACTIVITY: CPT | Performed by: EMERGENCY MEDICINE

## 2024-06-23 PROCEDURE — 85305 CLOT INHIBIT PROT S TOTAL: CPT | Performed by: EMERGENCY MEDICINE

## 2024-06-23 PROCEDURE — 83880 ASSAY OF NATRIURETIC PEPTIDE: CPT | Performed by: EMERGENCY MEDICINE

## 2024-06-23 PROCEDURE — 85306 CLOT INHIBIT PROT S FREE: CPT | Performed by: EMERGENCY MEDICINE

## 2024-06-23 PROCEDURE — 85027 COMPLETE CBC AUTOMATED: CPT | Performed by: EMERGENCY MEDICINE

## 2024-06-23 PROCEDURE — 71275 CT ANGIOGRAPHY CHEST: CPT

## 2024-06-23 PROCEDURE — 80053 COMPREHEN METABOLIC PANEL: CPT | Performed by: EMERGENCY MEDICINE

## 2024-06-23 PROCEDURE — 36415 COLL VENOUS BLD VENIPUNCTURE: CPT | Performed by: EMERGENCY MEDICINE

## 2024-06-23 PROCEDURE — 85730 THROMBOPLASTIN TIME PARTIAL: CPT | Performed by: FAMILY MEDICINE

## 2024-06-23 PROCEDURE — 86147 CARDIOLIPIN ANTIBODY EA IG: CPT | Performed by: EMERGENCY MEDICINE

## 2024-06-23 PROCEDURE — 99291 CRITICAL CARE FIRST HOUR: CPT | Performed by: EMERGENCY MEDICINE

## 2024-06-23 PROCEDURE — 99223 1ST HOSP IP/OBS HIGH 75: CPT | Performed by: FAMILY MEDICINE

## 2024-06-23 PROCEDURE — 93005 ELECTROCARDIOGRAM TRACING: CPT

## 2024-06-23 PROCEDURE — 85025 COMPLETE CBC W/AUTO DIFF WBC: CPT | Performed by: EMERGENCY MEDICINE

## 2024-06-23 PROCEDURE — 84484 ASSAY OF TROPONIN QUANT: CPT | Performed by: FAMILY MEDICINE

## 2024-06-23 PROCEDURE — 85670 THROMBIN TIME PLASMA: CPT | Performed by: EMERGENCY MEDICINE

## 2024-06-23 PROCEDURE — 85730 THROMBOPLASTIN TIME PARTIAL: CPT | Performed by: EMERGENCY MEDICINE

## 2024-06-23 PROCEDURE — 71046 X-RAY EXAM CHEST 2 VIEWS: CPT

## 2024-06-23 PROCEDURE — 84484 ASSAY OF TROPONIN QUANT: CPT | Performed by: EMERGENCY MEDICINE

## 2024-06-23 RX ORDER — LIDOCAINE 50 MG/G
1 PATCH TOPICAL DAILY
Status: DISCONTINUED | OUTPATIENT
Start: 2024-06-23 | End: 2024-06-25 | Stop reason: HOSPADM

## 2024-06-23 RX ORDER — ATORVASTATIN CALCIUM 40 MG/1
40 TABLET, FILM COATED ORAL
Status: DISCONTINUED | OUTPATIENT
Start: 2024-06-23 | End: 2024-06-25 | Stop reason: HOSPADM

## 2024-06-23 RX ORDER — HEPARIN SODIUM 10000 [USP'U]/100ML
3-30 INJECTION, SOLUTION INTRAVENOUS
Status: DISCONTINUED | OUTPATIENT
Start: 2024-06-23 | End: 2024-06-25

## 2024-06-23 RX ORDER — HEPARIN SODIUM 1000 [USP'U]/ML
7600 INJECTION, SOLUTION INTRAVENOUS; SUBCUTANEOUS ONCE
Status: COMPLETED | OUTPATIENT
Start: 2024-06-23 | End: 2024-06-23

## 2024-06-23 RX ORDER — HEPARIN SODIUM 1000 [USP'U]/ML
3800 INJECTION, SOLUTION INTRAVENOUS; SUBCUTANEOUS EVERY 6 HOURS PRN
Status: DISCONTINUED | OUTPATIENT
Start: 2024-06-23 | End: 2024-06-25

## 2024-06-23 RX ORDER — HEPARIN SODIUM 1000 [USP'U]/ML
7600 INJECTION, SOLUTION INTRAVENOUS; SUBCUTANEOUS EVERY 6 HOURS PRN
Status: DISCONTINUED | OUTPATIENT
Start: 2024-06-23 | End: 2024-06-25

## 2024-06-23 RX ORDER — PANTOPRAZOLE SODIUM 40 MG/1
40 TABLET, DELAYED RELEASE ORAL
Status: DISCONTINUED | OUTPATIENT
Start: 2024-06-23 | End: 2024-06-25 | Stop reason: HOSPADM

## 2024-06-23 RX ORDER — OXYCODONE HYDROCHLORIDE 5 MG/1
5 TABLET ORAL EVERY 6 HOURS PRN
Status: DISCONTINUED | OUTPATIENT
Start: 2024-06-23 | End: 2024-06-25 | Stop reason: HOSPADM

## 2024-06-23 RX ORDER — GABAPENTIN 300 MG/1
300 CAPSULE ORAL 3 TIMES DAILY
Status: DISCONTINUED | OUTPATIENT
Start: 2024-06-23 | End: 2024-06-25 | Stop reason: HOSPADM

## 2024-06-23 RX ORDER — ONDANSETRON 2 MG/ML
4 INJECTION INTRAMUSCULAR; INTRAVENOUS EVERY 6 HOURS PRN
Status: DISCONTINUED | OUTPATIENT
Start: 2024-06-23 | End: 2024-06-25 | Stop reason: HOSPADM

## 2024-06-23 RX ADMIN — ATORVASTATIN CALCIUM 40 MG: 40 TABLET, FILM COATED ORAL at 16:38

## 2024-06-23 RX ADMIN — HEPARIN SODIUM 7600 UNITS: 1000 INJECTION INTRAVENOUS; SUBCUTANEOUS at 11:28

## 2024-06-23 RX ADMIN — GABAPENTIN 300 MG: 300 CAPSULE ORAL at 20:46

## 2024-06-23 RX ADMIN — OXYCODONE HYDROCHLORIDE 5 MG: 5 TABLET ORAL at 19:22

## 2024-06-23 RX ADMIN — IOHEXOL 100 ML: 350 INJECTION, SOLUTION INTRAVENOUS at 09:41

## 2024-06-23 RX ADMIN — HEPARIN SODIUM 18 UNITS/KG/HR: 10000 INJECTION, SOLUTION INTRAVENOUS at 11:28

## 2024-06-23 NOTE — ASSESSMENT & PLAN NOTE
Continue opiate pain medication  Given acute pleuritic chest pain in setting of pulmonary emboli, will increase frequency and available dosing for pain control

## 2024-06-23 NOTE — ED PROVIDER NOTES
History  Chief Complaint   Patient presents with    Chest Pain     Patient complains of midsternal chest pain that radiates into his left side that started last night. Patient states when he takes a deep breath the pain is worse. EMS gave aspirin and 1 nitro. Patients pain is 7/10 at this time.      HPI  63-year-old male past medical history includes GERD, eosinophilic esophagitis, BPH, history of C-spine fusion, myelomalacia of cervical cord history of spinal cord injury, remote tobacco use, presenting to the ER for evaluation of chest discomfort.    Patient reports chest pain which began yesterday throughout the day he noted left lateral chest wall pain which radiates to the lower substernal region.  He feels like it hurts to take a deep breath in.  He denies association with meals, denies nausea or vomiting lightheadedness dizziness diaphoresis syncope.  Reports pain waxing and waning was worse this morning prompting him to seek evaluation.  Did not feel like he could drive due to the severity of the symptoms.  Denies a history of the symptoms prior to the last few days.  Denies any recent overexertion reports has been staying in the house and the air conditioning taking care of sick dog  Prior to Admission Medications   Prescriptions Last Dose Informant Patient Reported? Taking?   Zinc 50 MG CAPS  Self Yes Yes   Sig: Take by mouth   cholecalciferol (VITAMIN D3) 1,000 units tablet  Self Yes Yes   Sig: Take 1,000 Units by mouth daily   gabapentin (NEURONTIN) 300 mg capsule  Self Yes No   Sig: Take 300 mg by mouth 3 (three) times a day as needed   ibuprofen (MOTRIN) 800 mg tablet  Self Yes No   Sig: ibuprofen 800 mg tablet   omeprazole (PriLOSEC) 40 MG capsule  Self No Yes   Sig: Take 1 capsule (40 mg total) by mouth daily   oxyCODONE (OXY-IR) 5 MG capsule   No Yes   Sig: Take 1 capsule (5 mg total) by mouth 2 (two) times a day as needed for moderate pain Max Daily Amount: 10 mg   rosuvastatin (CRESTOR) 5 mg tablet    No Yes   Sig: Take 1 tablet (5 mg total) by mouth daily      Facility-Administered Medications: None       Past Medical History:   Diagnosis Date    Arthritis     Benign prostatic hyperplasia with incomplete bladder emptying 02/28/2024    Chronic pain disorder     COVID 2021    GERD (gastroesophageal reflux disease)     Spinal cord dysplasia (HCC)        Past Surgical History:   Procedure Laterality Date    CERVICAL FUSION      EGD      HERNIA REPAIR Left     NASAL SEPTOPLASTY W/ TURBINOPLASTY  08/22/2003    MD BX PROSTATE STRTCTC SATURATION SAMPLING IMG GID N/A 11/22/2023    Procedure: TRANSPERINEAL ULTRASOUND GUIDED BIOPSY PROSTATE;  Surgeon: Luis Antonio Rodrigues MD;  Location: [facility];  Service: Urology    MD TRURL ELECTROSURG RESCJ PROSTATE BLEED COMPLETE N/A 2/28/2024    Procedure: TRANSURETHRAL RESECTION OF PROSTATE (TURP);  Surgeon: Luis Antonio Rodrigues MD;  Location: [facility];  Service: Urology    TOOTH EXTRACTION         Family History   Problem Relation Age of Onset    Cancer Father     Lung cancer Brother     Cancer Son      I have reviewed and agree with the history as documented.    E-Cigarette/Vaping    E-Cigarette Use Never User      E-Cigarette/Vaping Substances    Nicotine No     THC No     CBD No     Flavoring No     Other No     Unknown No      Social History     Tobacco Use    Smoking status: Former     Average packs/day: 0.5 packs/day for 40.0 years (20.0 ttl pk-yrs)     Types: Cigarettes     Start date: 1974     Passive exposure: Past    Smokeless tobacco: Never   Vaping Use    Vaping status: Never Used   Substance Use Topics    Alcohol use: Yes     Comment: couple beers a week    Drug use: Not Currently       Review of Systems   Constitutional:  Negative for activity change, appetite change, chills, diaphoresis, fatigue and fever.   HENT:  Negative for ear pain and sore throat.    Eyes:  Negative for pain and visual disturbance.   Respiratory:  Positive for shortness of breath. Negative for cough.     Cardiovascular:  Positive for chest pain. Negative for palpitations and leg swelling.   Gastrointestinal:  Negative for abdominal pain, nausea and vomiting.   Genitourinary:  Negative for dysuria and hematuria.   Musculoskeletal:  Negative for arthralgias and back pain.   Skin:  Negative for color change and rash.   Neurological:  Negative for dizziness, seizures, syncope, light-headedness and numbness.   All other systems reviewed and are negative.      Physical Exam  Physical Exam  Vitals and nursing note reviewed.   Constitutional:       General: He is not in acute distress.     Appearance: He is well-developed. He is not ill-appearing, toxic-appearing or diaphoretic.   HENT:      Head: Normocephalic and atraumatic.   Neck:      Vascular: No JVD.      Trachea: No tracheal deviation.   Cardiovascular:      Rate and Rhythm: Normal rate and regular rhythm.      Pulses:           Radial pulses are 2+ on the right side and 2+ on the left side.      Heart sounds: Normal heart sounds. No murmur heard.     No systolic murmur is present.      No diastolic murmur is present.   Pulmonary:      Effort: Pulmonary effort is normal.      Breath sounds: Normal breath sounds. No decreased breath sounds, wheezing, rhonchi or rales.   Chest:      Chest wall: No tenderness.   Abdominal:      Palpations: Abdomen is soft.   Musculoskeletal:      Right lower leg: No tenderness. No edema.      Left lower leg: No tenderness. No edema.   Skin:     General: Skin is warm and dry.      Capillary Refill: Capillary refill takes less than 2 seconds.      Coloration: Skin is not cyanotic or pale.   Neurological:      General: No focal deficit present.      Mental Status: He is alert and oriented to person, place, and time.         Vital Signs  ED Triage Vitals   Temperature Pulse Respirations Blood Pressure SpO2   06/23/24 0741 06/23/24 0741 06/23/24 0741 06/23/24 0744 06/23/24 0741   99.7 °F (37.6 °C) 105 18 146/84 91 %      Temp Source Heart  Rate Source Patient Position - Orthostatic VS BP Location FiO2 (%)   06/23/24 0741 06/23/24 0741 06/23/24 0800 06/23/24 0800 --   Temporal Monitor Sitting Right arm       Pain Score       06/23/24 0741       7           Vitals:    06/23/24 0800 06/23/24 0830 06/23/24 0900 06/23/24 0945   BP: 137/80 128/79 130/79 150/82   Pulse: 90 79 80 77   Patient Position - Orthostatic VS: Sitting Sitting Sitting Sitting         Visual Acuity      ED Medications  Medications   heparin (porcine) injection 7,600 Units (has no administration in time range)   heparin (porcine) 25,000 units in 0.45% NaCl 250 mL infusion (premix) (has no administration in time range)   heparin (porcine) injection 7,600 Units (has no administration in time range)   heparin (porcine) injection 3,800 Units (has no administration in time range)   iohexol (OMNIPAQUE) 350 MG/ML injection (MULTI-DOSE) 100 mL (100 mL Intravenous Given 6/23/24 0941)       Diagnostic Studies  Results Reviewed       Procedure Component Value Units Date/Time    APTT [[id number]]  (Normal) Collected: 06/23/24 1100    Lab Status: Final result Specimen: Blood from Arm, Right Updated: 06/23/24 1118     PTT 28 seconds     Protime-INR [[id number]]  (Normal) Collected: 06/23/24 1100    Lab Status: Final result Specimen: Blood from Arm, Right Updated: 06/23/24 1118     Protime 13.4 seconds      INR 1.03    CBC [[id number]]  (Normal) Collected: 06/23/24 1100    Lab Status: Final result Specimen: Blood from Arm, Right Updated: 06/23/24 1109     WBC 9.08 Thousand/uL      RBC 4.26 Million/uL      Hemoglobin 12.9 g/dL      Hematocrit 38.7 %      MCV 91 fL      MCH 30.3 pg      MCHC 33.3 g/dL      RDW 13.1 %      Platelets 192 Thousands/uL      MPV 10.2 fL     Beta-2 glycoprotein antibodies [[id number]] Collected: 06/23/24 1058    Lab Status: In process Specimen: Blood from Arm, Right Updated: 06/23/24 1105    Cardiolipin antibody [[id number]] Collected: 06/23/24 1058    Lab Status: In process  Specimen: Blood from Arm, Right Updated: 06/23/24 1105    Protein S activity [[id number]] Collected: 06/23/24 1058    Lab Status: In process Specimen: Blood from Arm, Right Updated: 06/23/24 1105    Protein C activity [[id number]] Collected: 06/23/24 1058    Lab Status: In process Specimen: Blood from Arm, Right Updated: 06/23/24 1105    Protein S Antigen, Total & Free [[id number]] Collected: 06/23/24 1058    Lab Status: In process Specimen: Blood from Arm, Right Updated: 06/23/24 1105    Antithrombin III Activity [[id number]] Collected: 06/23/24 1058    Lab Status: In process Specimen: Blood from Arm, Right Updated: 06/23/24 1105    Lupus anticoagulant [[id number]] Collected: 06/23/24 1058    Lab Status: In process Specimen: Blood from Arm, Right Updated: 06/23/24 1105    B-Type Natriuretic Peptide(BNP) [[id number]]  (Normal) Collected: 06/23/24 0745    Lab Status: Final result Specimen: Blood from Arm, Right Updated: 06/23/24 1055     BNP 9 pg/mL     HS Troponin I 2hr [[id number]]  (Normal) Collected: 06/23/24 0955    Lab Status: Final result Specimen: Blood from Arm, Left Updated: 06/23/24 1026     hs TnI 2hr 3 ng/L      Delta 2hr hsTnI 0 ng/L     HS Troponin 0hr (reflex protocol) [[id number]]  (Normal) Collected: 06/23/24 0745    Lab Status: Final result Specimen: Blood from Arm, Right Updated: 06/23/24 0817     hs TnI 0hr 3 ng/L     HS Troponin I 4hr [[id number]]     Lab Status: No result Specimen: Blood     D-Dimer [[id number]]  (Abnormal) Collected: 06/23/24 0745    Lab Status: Final result Specimen: Blood from Arm, Right Updated: 06/23/24 0817     D-Dimer, Quant 0.80 ug/ml FEU     Narrative:      In the evaluation for possible pulmonary embolism, in the appropriate (Well's Score of 4 or less) patient, the age adjusted d-dimer cutoff for this patient can be calculated as:    Age x 0.01 (in ug/mL) for Age-adjusted D-dimer exclusion threshold for a patient over 50 years.    Comprehensive metabolic panel [[id number]]  Collected: 06/23/24 0745    Lab Status: Final result Specimen: Blood from Arm, Right Updated: 06/23/24 0813     Sodium 139 mmol/L      Potassium 4.0 mmol/L      Chloride 102 mmol/L      CO2 27 mmol/L      ANION GAP 10 mmol/L      BUN 15 mg/dL      Creatinine 1.21 mg/dL      Glucose 110 mg/dL      Calcium 9.7 mg/dL      AST 15 U/L      ALT 21 U/L      Alkaline Phosphatase 63 U/L      Total Protein 7.6 g/dL      Albumin 4.6 g/dL      Total Bilirubin 0.79 mg/dL      eGFR 62 ml/min/1.73sq m     Narrative:      [employer] guidelines for Chronic Kidney Disease (CKD):     Stage 1 with normal or high GFR (GFR > 90 mL/min/1.73 square meters)    Stage 2 Mild CKD (GFR = 60-89 mL/min/1.73 square meters)    Stage 3A Moderate CKD (GFR = 45-59 mL/min/1.73 square meters)    Stage 3B Moderate CKD (GFR = 30-44 mL/min/1.73 square meters)    Stage 4 Severe CKD (GFR = 15-29 mL/min/1.73 square meters)    Stage 5 End Stage CKD (GFR <15 mL/min/1.73 square meters)  Note: GFR calculation is accurate only with a steady state creatinine    Lipase [[id number]]  (Normal) Collected: 06/23/24 0745    Lab Status: Final result Specimen: Blood from Arm, Right Updated: 06/23/24 0813     Lipase 18 u/L     CBC and differential [[id number]] Collected: 06/23/24 0745    Lab Status: Final result Specimen: Blood from Arm, Right Updated: 06/23/24 0750     WBC 7.47 Thousand/uL      RBC 4.35 Million/uL      Hemoglobin 13.2 g/dL      Hematocrit 39.5 %      MCV 91 fL      MCH 30.3 pg      MCHC 33.4 g/dL      RDW 13.1 %      MPV 10.3 fL      Platelets 201 Thousands/uL      nRBC 0 /100 WBCs      Segmented % 57 %      Immature Grans % 0 %      Lymphocytes % 28 %      Monocytes % 10 %      Eosinophils Relative 4 %      Basophils Relative 1 %      Absolute Neutrophils 4.31 Thousands/µL      Absolute Immature Grans 0.03 Thousand/uL      Absolute Lymphocytes 2.09 Thousands/µL      Absolute Monocytes 0.71 Thousand/µL      Eosinophils Absolute 0.28  Thousand/µL      Basophils Absolute 0.05 Thousands/µL                    PE Study with CT abdomen & pelvis with contrast   Final Result by Augustine Addison MD (06/23 1022)         1. Small peripheral bilateral pulmonary arterial filling defects consistent with acute pulmonary emboli. Somewhat elevated RV/LV ratio at 1.03.   2. Left bibasilar atelectasis. More focal dependent left lower lobe opacification could also represent pneumonia. Small loculated left pleural effusion.   3. Mildly enlarged mediastinal and left hilar lymph nodes, likely reactive in nature.   4. No acute abnormality identified in the abdomen or pelvis.   5. Additional findings as noted.         I personally discussed this study with KRISTY CHAVIRA on 6/23/2024 10:22 AM.                           Workstation performed: [id number]         XR chest 2 views   Final Result by Deena Bass MD (06/23 0824)      Low lung volumes with bibasilar opacity which could be due to atelectasis. Pneumonia/aspiration not excluded in the appropriate clinical setting.               Workstation performed: [id number]          VAS VENOUS DUPLEX - LOWER LIMB BILATERAL    (Results Pending)              Procedures  CriticalCare Time    Date/Time: 6/23/2024 11:19 AM    Performed by: Kristy Chavira DO  Authorized by: Kristy Chavira DO    Critical care provider statement:     Critical care time (minutes):  40    Critical care time was exclusive of:  Separately billable procedures and treating other patients and teaching time    Critical care was necessary to treat or prevent imminent or life-threatening deterioration of the following conditions:  Circulatory failure and cardiac failure    Critical care was time spent personally by me on the following activities:  Development of treatment plan with patient or surrogate, obtaining history from patient or surrogate, discussions with consultants, discussions with primary provider, evaluation of  patient's response to treatment, examination of patient, review of old charts, re-evaluation of patient's condition, ordering and review of radiographic studies, ordering and review of laboratory studies and ordering and performing treatments and interventions    I assumed direction of critical care for this patient from another provider in my specialty: no             ED Course  ED Course as of 06/23/24 1122   Sun Jun 23, 2024   0744 Pulse: 105   0744 SpO2: 91 %   0744 EKG interpreted by myself.  EKG dated 6/30/2024 at 0744 demonstrates sinus tachycardia 102 bpm, normal OK, QRS, QTc durations, age-indeterminate septal infarct pattern, no STEMI.   0747 When compared to prior EKG on file from 2/19/2024, septal infarct pattern is chronic.   0928 hs TnI 0hr: 3   1027 IMPRESSION:        1. Small peripheral bilateral pulmonary arterial filling defects consistent with acute pulmonary emboli. Somewhat elevated RV/LV ratio at 1.03.  2. Left bibasilar atelectasis. More focal dependent left lower lobe opacification could also represent pneumonia. Small loculated left pleural effusion.  3. Mildly enlarged mediastinal and left hilar lymph nodes, likely reactive in nature.  4. No acute abnormality identified in the abdomen or pelvis.  5. Additional findings as noted.        I personally discussed this study with KRISTY CHAVIRA on 6/23/2024 10:22 AM.     1050 Counseled patient on diagnosis and plan, brother at bedside too, all questions answered             HEART Risk Score      Flowsheet Row Most Recent Value   Heart Score Risk Calculator    History 1 Filed at: 06/23/2024 0928   ECG 1 Filed at: 06/23/2024 0928   Age 2 Filed at: 06/23/2024 0928   Risk Factors 1 Filed at: 06/23/2024 0928   Troponin 0 Filed at: 06/23/2024 0928   HEART Score 5 Filed at: 06/23/2024 0928                          SBIRT 22yo+      Flowsheet Row Most Recent Value   Initial Alcohol Screen: US AUDIT-C     1. How often do you have a drink containing  alcohol? 0 Filed at: 06/23/2024 0801   2. How many drinks containing alcohol do you have on a typical day you are drinking?  0 Filed at: 06/23/2024 0801   3a. Male UNDER 65: How often do you have five or more drinks on one occasion? 0 Filed at: 06/23/2024 0801   3b. FEMALE Any Age, or MALE 65+: How often do you have 4 or more drinks on one occassion? 0 Filed at: 06/23/2024 0801   Audit-C Score 0 Filed at: 06/23/2024 0801   TORSTEN: How many times in the past year have you...    Used an illegal drug or used a prescription medication for non-medical reasons? Never Filed at: 06/23/2024 0801                      Medical Decision Making  63-year-old male presented to the ER for 2-day history of new onset chest pain described as left-sided, pleuritic somewhat described as dyspnea on exertion.  Has been continuous.  His lungs are clear.  Heart rate slightly elevated but otherwise normal vital signs low normal oxygen not requiring supplemental oxygen in the ER.  No history of respiratory illness.  No infectious symptoms to suggest pneumonia.  Troponin not elevated EKG no STEMI.  D-dimer elevated proceeded with CTA chest PE which demonstrated bilateral subsegmental pulmonary emboli and some reactive changes to the left lower lung which are felt to explain the patient's pleuritic left-sided chest pain.  RV to LV ratio was elevated greater than 0.9.  Patient had no evidence of shock.  Utilizing the PERT algorithm, patient was intermediate risk due to the presence of an elevated RV to LV ratio without presence of shock.  PESI score was calculated at 75 (consistent with class II) patient did not have elevated cardiac biomarkers.  This places the patient in the intermediate low risk category recommending admission to [facility], heparin drip, priority per echo, case discussed with Darnell agreeable to admit.  Patient updated on the plan and agreeable.  All questions answered.    Problems Addressed:  Multiple subsegmental pulmonary emboli  without acute cor pulmonale (HCC): acute illness or injury that poses a threat to life or bodily functions  Pleuritic chest pain: acute illness or injury    Amount and/or Complexity of Data Reviewed  Labs: ordered. Decision-making details documented in ED Course.  Radiology: ordered.  ECG/medicine tests: ordered and independent interpretation performed. Decision-making details documented in ED Course.    Risk  Prescription drug management.  Decision regarding hospitalization.        PESI  Age: 63 years old  Sex: 10  History of Cancer: 0  History of Heart Failure: 0  History of Chronic Lung Disease: 0  Heart rate greater than or equal to 110: 0  Systolic BP < 100 mmH  Respiratory rate greater than or equal to 30: 0  Temperature <36°C/96.8°F: 0  Altered Mental Status (Disorientation, lethargy, stupor, or coma): 0  O2 saturation <90%: 0  PESI Score Results: 75        Disposition  Final diagnoses:   Multiple subsegmental pulmonary emboli without acute cor pulmonale (HCC)   Pleuritic chest pain     Time reflects when diagnosis was documented in both MDM as applicable and the Disposition within this note       Time User Action Codes Description Comment    2024 11:16 AM Perez Williamson Add [I26.94] Multiple subsegmental pulmonary emboli without acute cor pulmonale (HCC)     2024 11:17 AM Perez Williamson [R07.81] Pleuritic chest pain           ED Disposition       ED Disposition   Admit    Condition   Stable    Date/Time   Sun 2024 1116    Comment   Case was discussed with CALEB and the patient's admission status was agreed to be Admission Status: inpatient status to the service of Dr. Scott .               Follow-up Information    None         Patient's Medications   Discharge Prescriptions    No medications on file       No discharge procedures on file.    PDMP Review         Value Time User    PDMP Reviewed  Yes 2024 11:33 AM Gui Espinosa DO            ED  Provider  Electronically Signed by             Perez Williamson DO  06/23/24 112

## 2024-06-23 NOTE — ASSESSMENT & PLAN NOTE
This is a 62 yo M with history of DVT approximately 10 yrs ago considered provoked from cervical fusion surgery, history of chronic opiate pain medication dependence, GERD who presents with 2-day history of pleuritic chest and left-sided pain    CTA of the chest PE protocol performed in the ED with the following findings:  1. Small peripheral bilateral pulmonary arterial filling defects consistent with acute pulmonary emboli. Somewhat elevated RV/LV ratio at 1.03.   2. Left bibasilar atelectasis. More focal dependent left lower lobe opacification could also represent pneumonia. Small loculated left pleural effusion.   3. Mildly enlarged mediastinal and left hilar lymph nodes, likely reactive in nature.     Per [facility] PE treatment protocol with a score calculated at 75 with mildly elevated RV to LV ratio with no shock, normal biomarkers  The patient was initiated on IV heparin drip, continue  Patient is hemodynamically stable and is admitted to [facility] with telemetry, upgrade to higher level of care if any concern for hemodynamic instability  A 2D echo ordered  Venous duplex pending  Thrombosis panel obtained in ED, follow-up results  Discussed with pulmonology regarding imaging.  The measurement appears borderline for RV strain with small PE, no contrast in the liver.  Formal pulmonology consult also placed.  Patient had previously been on Xarelto, can send to pharmacy for pricing  Close monitoring of hemodynamics and respiratory status  Provide analgesia

## 2024-06-23 NOTE — H&P
[facility]  H&P  Name: Vini Ybarra 63 y.o. male I MRN: [id number]  Unit/Bed#: 404-01 I Date of Admission: 6/23/2024   Date of Service: 6/23/2024 I Hospital Day: 0      Assessment & Plan   * Bilateral pulmonary embolism (HCC)  Assessment & Plan  This is a 64 yo M with history of DVT approximately 10 yrs ago considered provoked from cervical fusion surgery, history of chronic opiate pain medication dependence, GERD who presents with 2-day history of pleuritic chest and left-sided pain    CTA of the chest PE protocol performed in the ED with the following findings:  1. Small peripheral bilateral pulmonary arterial filling defects consistent with acute pulmonary emboli. Somewhat elevated RV/LV ratio at 1.03.   2. Left bibasilar atelectasis. More focal dependent left lower lobe opacification could also represent pneumonia. Small loculated left pleural effusion.   3. Mildly enlarged mediastinal and left hilar lymph nodes, likely reactive in nature.     Per [facility] PE treatment protocol with a score calculated at 75 with mildly elevated RV to LV ratio with no shock, normal biomarkers  The patient was initiated on IV heparin drip, continue  Patient is hemodynamically stable and is admitted to [facility] with telemetry, upgrade to higher level of care if any concern for hemodynamic instability  A 2D echo ordered  Venous duplex pending  Thrombosis panel obtained in ED, follow-up results  Discussed with pulmonology regarding imaging.  The measurement appears borderline for RV strain with small PE, no contrast in the liver.  Formal pulmonology consult also placed.  Patient had previously been on Xarelto, can send to pharmacy for pricing  Close monitoring of hemodynamics and respiratory status  Provide analgesia     Continuous opioid dependence (HCC)  Assessment & Plan  Continue opiate pain medication  Given acute pleuritic chest pain in setting of pulmonary emboli, will increase frequency and available  dosing for pain control    BPH without obstruction/lower urinary tract symptoms  Assessment & Plan  Monitor for urinary retention    Gastroesophageal reflux disease  Assessment & Plan  Continue PPI    Myelomalacia of cervical cord (HCC)  Assessment & Plan  Status post cervical fusion  Provide analgesia         VTE Pharmacologic Prophylaxis: VTE Score: 6 High Risk (Score >/= 5) - Pharmacological DVT Prophylaxis Ordered: heparin drip. Sequential Compression Devices Ordered.  Code Status: Level 1 - Full Code   Discussion with family: Updated  (brother) at bedside.    Anticipated Length of Stay: Patient will be admitted on an inpatient basis with an anticipated length of stay of greater than 2 midnights secondary to IV heparin, diagnostic workup.    Total Time Spent on Date of Encounter in care of patient: 75 mins. This time was spent on one or more of the following: performing physical exam; counseling and coordination of care; obtaining or reviewing history; documenting in the medical record; reviewing/ordering tests, medications or procedures; communicating with other healthcare professionals and discussing with patient's family/caregivers.    Chief Complaint: Epigastric and left-sided chest pain    History of Present Illness:  Vini Ybarra is a 63 y.o. male with a PMH of opiate dependence, chronic neck pain status post cervical fusion, provoked DVT 10 years ago, GERD who presents with epigastric and pain at left side of torso on deep inspiration which woke him up from sleep in the middle of the night the day prior.  The patient states that prior to this he had been at his normal state of health.  The patient denies chills or fevers, palpitations, nausea or vomiting, bowel or urinary symptoms or lower extremity edema, erythema or pain.  He does note that he recently has been less active staying at home more.    Review of Systems:  Review of Systems   Constitutional:  Negative for chills, fatigue and  fever.   HENT:  Negative for congestion.    Eyes:  Negative for visual disturbance.   Respiratory:  Negative for shortness of breath.         Pain on deep inspiration   Cardiovascular:  Negative for palpitations and leg swelling.   Gastrointestinal:  Negative for constipation, diarrhea, nausea and vomiting.   Endocrine: Negative for polydipsia and polyuria.   Genitourinary:  Negative for dysuria.   Musculoskeletal:  Positive for neck pain.   Skin:  Negative for rash.   Neurological:  Negative for dizziness and syncope.   Psychiatric/Behavioral:  Negative for confusion.        Past Medical and Surgical History:   Past Medical History:   Diagnosis Date    Arthritis     Benign prostatic hyperplasia with incomplete bladder emptying 02/28/2024    Chronic pain disorder     COVID 2021    GERD (gastroesophageal reflux disease)     Spinal cord dysplasia (HCC)        Past Surgical History:   Procedure Laterality Date    CERVICAL FUSION      EGD      HERNIA REPAIR Left     NASAL SEPTOPLASTY W/ TURBINOPLASTY  08/22/2003    MA BX PROSTATE STRTCTC SATURATION SAMPLING IMG GID N/A 11/22/2023    Procedure: TRANSPERINEAL ULTRASOUND GUIDED BIOPSY PROSTATE;  Surgeon: Luis Antonio Rodrigues MD;  Location: [facility];  Service: Urology    MA TRURL ELECTROSURG RESCJ PROSTATE BLEED COMPLETE N/A 2/28/2024    Procedure: TRANSURETHRAL RESECTION OF PROSTATE (TURP);  Surgeon: Luis Antonio Rodrigues MD;  Location: [facility];  Service: Urology    TOOTH EXTRACTION         Meds/Allergies:  Prior to Admission medications    Medication Sig Start Date End Date Taking? Authorizing Provider   cholecalciferol (VITAMIN D3) 1,000 units tablet Take 1,000 Units by mouth daily   Yes Historical Provider, MD   omeprazole (PriLOSEC) 40 MG capsule Take 1 capsule (40 mg total) by mouth daily 1/31/24  Yes LILIANA Patrick   oxyCODONE (OXY-IR) 5 MG capsule Take 1 capsule (5 mg total) by mouth 2 (two) times a day as needed for moderate pain Max Daily Amount: 10 mg 5/17/24  Yes  "Gui Espinosa DO   rosuvastatin (CRESTOR) 5 mg tablet Take 1 tablet (5 mg total) by mouth daily 5/22/24  Yes Gui Espinosa DO   Zinc 50 MG CAPS Take by mouth   Yes Historical Provider, MD   gabapentin (NEURONTIN) 300 mg capsule Take 300 mg by mouth 3 (three) times a day as needed    Historical Provider, MD   ibuprofen (MOTRIN) 800 mg tablet ibuprofen 800 mg tablet 9/12/14   Historical Provider, MD     I have reviewed home medications with a medical source (PCP, Pharmacy, other).    Allergies:   Allergies   Allergen Reactions    Acetaminophen Other (See Comments)     Other reaction(s): TONGUE LIPS NOSE TINGLING SENSAT         Social History:  Marital Status: Single   Occupation: not addressed  Patient Pre-hospital Living Situation: Home  Patient Pre-hospital Level of Mobility: walks  Patient Pre-hospital Diet Restrictions: None  Substance Use History:   Social History     Substance and Sexual Activity   Alcohol Use Yes    Comment: couple beers a week     Social History     Tobacco Use   Smoking Status Former    Average packs/day: 0.5 packs/day for 40.0 years (20.0 ttl pk-yrs)    Types: Cigarettes    Start date: 1974    Passive exposure: Past   Smokeless Tobacco Never     Social History     Substance and Sexual Activity   Drug Use Not Currently       Family History:  Family History   Problem Relation Age of Onset    Cancer Father     Lung cancer Brother     Cancer Son        Physical Exam:     Vitals:   Blood Pressure: 130/82 (06/23/24 1154)  Pulse: 75 (06/23/24 1154)  Temperature: 98.2 °F (36.8 °C) (06/23/24 1154)  Temp Source: Oral (06/23/24 1154)  Respirations: 15 (06/23/24 1154)  Height: 5' 10\" (177.8 cm) (06/23/24 1154)  Weight - Scale: 93.6 kg (206 lb 5.6 oz) (06/23/24 1154)  SpO2: 95 % (06/23/24 1208)    Physical Exam  Constitutional:       General: He is not in acute distress.  HENT:      Head: Normocephalic and atraumatic.   Eyes:      Conjunctiva/sclera: Conjunctivae normal.   Cardiovascular:      " Rate and Rhythm: Normal rate and regular rhythm.      Heart sounds: No murmur heard.  Pulmonary:      Effort: No respiratory distress.      Breath sounds: No wheezing or rales.   Chest:      Chest wall: Tenderness (left side around ribs, lateral aspect of torso) present.   Abdominal:      General: There is no distension.      Tenderness: There is no abdominal tenderness. There is no guarding.   Musculoskeletal:         General: No tenderness.      Right lower leg: No edema.      Left lower leg: No edema.   Skin:     General: Skin is warm and dry.      Findings: No erythema.   Neurological:      Mental Status: He is oriented to person, place, and time.   Psychiatric:         Mood and Affect: Mood normal.          Additional Data:     Lab Results:  Results from last 7 days   Lab Units 06/23/24  1100 06/23/24  0745   WBC Thousand/uL 9.08 7.47   HEMOGLOBIN g/dL 12.9 13.2   HEMATOCRIT % 38.7 39.5   PLATELETS Thousands/uL 192 201   SEGS PCT %  --  57   LYMPHO PCT %  --  28   MONO PCT %  --  10   EOS PCT %  --  4     Results from last 7 days   Lab Units 06/23/24  0745   SODIUM mmol/L 139   POTASSIUM mmol/L 4.0   CHLORIDE mmol/L 102   CO2 mmol/L 27   BUN mg/dL 15   CREATININE mg/dL 1.21   ANION GAP mmol/L 10   CALCIUM mg/dL 9.7   ALBUMIN g/dL 4.6   TOTAL BILIRUBIN mg/dL 0.79   ALK PHOS U/L 63   ALT U/L 21   AST U/L 15   GLUCOSE RANDOM mg/dL 110     Results from last 7 days   Lab Units 06/23/24  1100   INR  1.03         Lab Results   Component Value Date    HGBA1C 5.3 04/14/2021    HGBA1C 5.5 04/05/2018           Lines/Drains:  Invasive Devices       Peripheral Intravenous Line  Duration             Peripheral IV 06/23/24 Left Antecubital <1 day    Peripheral IV 06/23/24 Right Antecubital <1 day                        Imaging:   PE Study with CT abdomen & pelvis with contrast   Final Result by Augustine Addison MD (06/23 1022)         1. Small peripheral bilateral pulmonary arterial filling defects consistent with acute  pulmonary emboli. Somewhat elevated RV/LV ratio at 1.03.   2. Left bibasilar atelectasis. More focal dependent left lower lobe opacification could also represent pneumonia. Small loculated left pleural effusion.   3. Mildly enlarged mediastinal and left hilar lymph nodes, likely reactive in nature.   4. No acute abnormality identified in the abdomen or pelvis.   5. Additional findings as noted.         I personally discussed this study with KRISTY CHAVIRA on 6/23/2024 10:22 AM.                           Workstation performed: [id number]         XR chest 2 views   Final Result by Deena Bass MD (06/23 0838)      Low lung volumes with bibasilar opacity which could be due to atelectasis. Pneumonia/aspiration not excluded in the appropriate clinical setting.               Workstation performed: [id number]          VAS VENOUS DUPLEX - LOWER LIMB BILATERAL    (Results Pending)       EKG and Other Studies Reviewed on Admission:   EKG:   Sinus tachycardia  Possible Left atrial enlargement  Septal infarct (cited on or before 19-FEB-2024)  Abnormal ECG  When compared with ECG of 19-FEB-2024 12:15,  Vent. rate has increased BY  39 BPM       ** Please Note: This note has been constructed using a voice recognition system. **

## 2024-06-23 NOTE — PLAN OF CARE
Problem: Potential for Falls  Goal: Patient will remain free of falls  Description: INTERVENTIONS:  - Educate patient/family on patient safety including physical limitations  - Instruct patient to call for assistance with activity (independent)  - Consult OT/PT to assist with strengthening/mobility   - Keep Call bell within reach  - Keep bed low and locked with side rails adjusted as appropriate  - Keep care items and personal belongings within reach  - Initiate and maintain comfort rounds  - Make Fall Risk Sign visible to staff (low fall risk)  - Offer Toileting every 1-2 Hours, in advance of need  - Obtain necessary fall risk management equipment: nonskid footwear  Outcome: Progressing     Problem: PAIN - ADULT  Goal: Verbalizes/displays adequate comfort level or baseline comfort level  Description: Interventions:  - Encourage patient to monitor pain and request assistance  - Assess pain using appropriate pain scale (0-10 pain scale)  - Administer analgesics based on type and severity of pain and evaluate response  - Implement non-pharmacological measures as appropriate and evaluate response  - Consider cultural and social influences on pain and pain management  - Notify physician/advanced practitioner if interventions unsuccessful or patient reports new pain  Outcome: Progressing     Problem: INFECTION - ADULT  Goal: Absence or prevention of progression during hospitalization  Description: INTERVENTIONS:  - Assess and monitor for signs and symptoms of infection  - Monitor lab/diagnostic results  - Monitor all insertion sites, i.e. indwelling lines  - Administer medications as ordered  - Instruct and encourage patient and family to use good hand hygiene technique  Outcome: Progressing     Problem: SAFETY ADULT  Goal: Patient will remain free of falls  Description: INTERVENTIONS:  - Educate patient/family on patient safety including physical limitations  - Instruct patient to call for assistance with activity  (independent)  - Consult OT/PT to assist with strengthening/mobility   - Keep Call bell within reach  - Keep bed low and locked with side rails adjusted as appropriate  - Keep care items and personal belongings within reach  - Initiate and maintain comfort rounds  - Make Fall Risk Sign visible to staff (low fall risk)  - Offer Toileting every 1-2 Hours, in advance of need  - Obtain necessary fall risk management equipment: nonskid footwear  Outcome: Progressing  Goal: Maintain or return to baseline ADL function  Description: INTERVENTIONS:  -  Assess patient's ability to carry out ADLs; (independent)  - Assess/evaluate cause of self-care deficits   - Assess range of motion  - Assess patient's mobility; (independent)  - Assess patient's need for assistive devices and provide as appropriate  - Encourage maximum independence but intervene and supervise when necessary  - Involve family in performance of ADLs  - Assess for home care needs following discharge   - Consider OT consult to assist with ADL evaluation and planning for discharge  - Provide patient education as appropriate  Outcome: Progressing  Goal: Maintains/Returns to pre admission functional level  Description: INTERVENTIONS:  - Perform AM-PAC 6 Click Basic Mobility/ Daily Activity assessment daily.  - Set and communicate daily mobility goal to care team and patient/family/caregiver.   - Collaborate with rehabilitation services on mobility goals if consulted  - Ambulate patient 3-6 times a day  - Out of bed to chair 3 times a day   - Out of bed for meals 3 times a day  - Out of bed for toileting  - Record patient progress and toleration of activity level   Outcome: Progressing     Problem: DISCHARGE PLANNING  Goal: Discharge to home or other facility with appropriate resources  Description: INTERVENTIONS:  - Identify barriers to discharge w/patient and caregiver  - Arrange for needed discharge resources and transportation as appropriate  - Identify discharge  learning needs (meds, wound care, etc.)  - Refer to Case Management Department for coordinating discharge planning if the patient needs post-hospital services based on physician/advanced practitioner order or complex needs related to functional status, cognitive ability, or social support system  Outcome: Progressing     Problem: Knowledge Deficit  Goal: Patient/family/caregiver demonstrates understanding of disease process, treatment plan, medications, and discharge instructions  Description: Complete learning assessment and assess knowledge base.  Interventions:  - Provide teaching at level of understanding  - Provide teaching via preferred learning methods  Outcome: Progressing     Problem: CARDIOVASCULAR - ADULT  Goal: Maintains optimal cardiac output and hemodynamic stability  Description: INTERVENTIONS:  - Monitor I/O, vital signs and rhythm  - Monitor for S/S and trends of decreased cardiac output  - Assess quality of pulses, skin color and temperature  - Assess for signs of decreased coronary artery perfusion  - Instruct patient to report change in severity of symptoms  Outcome: Progressing  Goal: Absence of cardiac dysrhythmias or at baseline rhythm  Description: INTERVENTIONS:  - Continuous cardiac monitoring, vital signs, obtain 12 lead EKG if ordered  - Administer antiarrhythmic and heart rate control medications as ordered  - Monitor electrolytes and administer replacement therapy as ordered  Outcome: Progressing     Problem: RESPIRATORY - ADULT  Goal: Achieves optimal ventilation and oxygenation  Description: INTERVENTIONS:  - Assess for changes in respiratory status  - Assess for changes in mentation and behavior  - Position to facilitate oxygenation and minimize respiratory effort  - Oxygen administered by appropriate delivery if ordered  - Encourage broncho-pulmonary hygiene including cough, deep breathe, Incentive Spirometry  - Assess and instruct to report SOB or any respiratory difficulty  -  Respiratory Therapy support as indicated  Outcome: Progressing     Problem: HEMATOLOGIC - ADULT  Goal: Maintains hematologic stability  Description: INTERVENTIONS  - Assess for signs and symptoms of bleeding or hemorrhage  - Monitor labs  - Administer supportive blood products/factors as ordered and appropriate  Outcome: Progressing

## 2024-06-24 ENCOUNTER — APPOINTMENT (INPATIENT)
Dept: NON INVASIVE DIAGNOSTICS | Facility: HOSPITAL | Age: 64
DRG: 175 | End: 2024-06-24
Payer: COMMERCIAL

## 2024-06-24 PROBLEM — J18.9 LEFT LOWER LOBE PNEUMONIA: Status: ACTIVE | Noted: 2024-06-24

## 2024-06-24 LAB
ALBUMIN SERPL BCG-MCNC: 4.4 G/DL (ref 3.5–5)
ALP SERPL-CCNC: 63 U/L (ref 34–104)
ALT SERPL W P-5'-P-CCNC: 19 U/L (ref 7–52)
ANION GAP SERPL CALCULATED.3IONS-SCNC: 8 MMOL/L (ref 4–13)
AORTIC ROOT: 3.5 CM
APTT PPP: 83 SECONDS (ref 23–37)
APTT PPP: 85 SECONDS (ref 23–37)
ASCENDING AORTA: 3.3 CM
AST SERPL W P-5'-P-CCNC: 16 U/L (ref 13–39)
ATRIAL RATE: 102 BPM
AV LVOT MEAN GRADIENT: 2 MMHG
AV LVOT PEAK GRADIENT: 4 MMHG
AV REGURGITATION PRESSURE HALF TIME: 717 MS
BASOPHILS # BLD AUTO: 0.07 THOUSANDS/ÂΜL (ref 0–0.1)
BASOPHILS NFR BLD AUTO: 1 % (ref 0–1)
BILIRUB SERPL-MCNC: 0.76 MG/DL (ref 0.2–1)
BSA FOR ECHO PROCEDURE: 2.11 M2
BUN SERPL-MCNC: 13 MG/DL (ref 5–25)
CALCIUM SERPL-MCNC: 9.7 MG/DL (ref 8.4–10.2)
CHLORIDE SERPL-SCNC: 102 MMOL/L (ref 96–108)
CO2 SERPL-SCNC: 27 MMOL/L (ref 21–32)
CREAT SERPL-MCNC: 1.09 MG/DL (ref 0.6–1.3)
DOP CALC LVOT PEAK VEL VTI: 21.48 CM
DOP CALC LVOT PEAK VEL: 1.02 M/S
E WAVE DECELERATION TIME: 208 MS
E/A RATIO: 0.91
EOSINOPHIL # BLD AUTO: 0.25 THOUSAND/ÂΜL (ref 0–0.61)
EOSINOPHIL NFR BLD AUTO: 3 % (ref 0–6)
ERYTHROCYTE [DISTWIDTH] IN BLOOD BY AUTOMATED COUNT: 13.1 % (ref 11.6–15.1)
FRACTIONAL SHORTENING: 27 (ref 28–44)
GFR SERPL CREATININE-BSD FRML MDRD: 70 ML/MIN/1.73SQ M
GLUCOSE SERPL-MCNC: 101 MG/DL (ref 65–140)
HCT VFR BLD AUTO: 40.5 % (ref 36.5–49.3)
HGB BLD-MCNC: 13.2 G/DL (ref 12–17)
IMM GRANULOCYTES # BLD AUTO: 0.03 THOUSAND/UL (ref 0–0.2)
IMM GRANULOCYTES NFR BLD AUTO: 0 % (ref 0–2)
INTERVENTRICULAR SEPTUM IN DIASTOLE (PARASTERNAL SHORT AXIS VIEW): 0.9 CM
INTERVENTRICULAR SEPTUM: 0.9 CM (ref 0.6–1.1)
L PNEUMO1 AG UR QL IA.RAPID: NEGATIVE
LAAS-AP2: 27.7 CM2
LAAS-AP4: 25.5 CM2
LEFT ATRIUM SIZE: 4.6 CM
LEFT ATRIUM VOLUME (MOD BIPLANE): 97 ML
LEFT ATRIUM VOLUME INDEX (MOD BIPLANE): 46 ML/M2
LEFT INTERNAL DIMENSION IN SYSTOLE: 3.3 CM (ref 2.1–4)
LEFT VENTRICULAR INTERNAL DIMENSION IN DIASTOLE: 4.5 CM (ref 3.5–6)
LEFT VENTRICULAR POSTERIOR WALL IN END DIASTOLE: 1 CM
LEFT VENTRICULAR STROKE VOLUME: 49 ML
LVSV (TEICH): 49 ML
LYMPHOCYTES # BLD AUTO: 2.24 THOUSANDS/ÂΜL (ref 0.6–4.47)
LYMPHOCYTES NFR BLD AUTO: 27 % (ref 14–44)
MCH RBC QN AUTO: 30.1 PG (ref 26.8–34.3)
MCHC RBC AUTO-ENTMCNC: 32.6 G/DL (ref 31.4–37.4)
MCV RBC AUTO: 93 FL (ref 82–98)
MONOCYTES # BLD AUTO: 0.84 THOUSAND/ÂΜL (ref 0.17–1.22)
MONOCYTES NFR BLD AUTO: 10 % (ref 4–12)
MV E'TISSUE VEL-LAT: 10 CM/S
MV E'TISSUE VEL-SEP: 8 CM/S
MV PEAK A VEL: 0.69 M/S
MV PEAK E VEL: 63 CM/S
MV STENOSIS PRESSURE HALF TIME: 60 MS
MV VALVE AREA P 1/2 METHOD: 3.7
NEUTROPHILS # BLD AUTO: 4.97 THOUSANDS/ÂΜL (ref 1.85–7.62)
NEUTS SEG NFR BLD AUTO: 59 % (ref 43–75)
NRBC BLD AUTO-RTO: 0 /100 WBCS
P AXIS: 25 DEGREES
PA SYSTOLIC PRESSURE: 28 MMHG
PLATELET # BLD AUTO: 217 THOUSANDS/UL (ref 149–390)
PMV BLD AUTO: 10.6 FL (ref 8.9–12.7)
POTASSIUM SERPL-SCNC: 4.1 MMOL/L (ref 3.5–5.3)
PR INTERVAL: 188 MS
PROT C AG ACT/NOR PPP IA: 127 % OF NORMAL (ref 60–150)
PROT S ACT/NOR PPP: 92 % (ref 71–117)
PROT SERPL-MCNC: 7.5 G/DL (ref 6.4–8.4)
PULM VEIN S/D RATIO: 0.85
PV PEAK D VEL: 0.2 M/S
PV PEAK S VEL: 0.17 M/S
QRS AXIS: 16 DEGREES
QRSD INTERVAL: 94 MS
QT INTERVAL: 322 MS
QTC INTERVAL: 419 MS
RBC # BLD AUTO: 4.38 MILLION/UL (ref 3.88–5.62)
RIGHT ATRIAL 2D VOLUME: 76 ML
RIGHT ATRIUM AREA SYSTOLE A4C: 23.2 CM2
RIGHT VENTRICLE ID DIMENSION: 3.9 CM
S PNEUM AG UR QL: NEGATIVE
SL CV AV DECELERATION TIME RETROGRADE: 2472 MS
SL CV AV PEAK GRADIENT RETROGRADE: 20 MMHG
SL CV LEFT ATRIUM LENGTH A2C: 5.9 CM
SL CV LV EF: 55
SL CV PED ECHO LEFT VENTRICLE DIASTOLIC VOLUME (MOD BIPLANE) 2D: 92 ML
SL CV PED ECHO LEFT VENTRICLE SYSTOLIC VOLUME (MOD BIPLANE) 2D: 43 ML
SODIUM SERPL-SCNC: 137 MMOL/L (ref 135–147)
T WAVE AXIS: 16 DEGREES
TR MAX PG: 28 MMHG
TR PEAK VELOCITY: 2.6 M/S
TRICUSPID VALVE PEAK REGURGITATION VELOCITY: 2.63 M/S
VENTRICULAR RATE: 102 BPM
WBC # BLD AUTO: 8.4 THOUSAND/UL (ref 4.31–10.16)

## 2024-06-24 PROCEDURE — 99223 1ST HOSP IP/OBS HIGH 75: CPT

## 2024-06-24 PROCEDURE — 99232 SBSQ HOSP IP/OBS MODERATE 35: CPT | Performed by: FAMILY MEDICINE

## 2024-06-24 PROCEDURE — 93010 ELECTROCARDIOGRAM REPORT: CPT | Performed by: INTERNAL MEDICINE

## 2024-06-24 PROCEDURE — 85730 THROMBOPLASTIN TIME PARTIAL: CPT | Performed by: FAMILY MEDICINE

## 2024-06-24 PROCEDURE — 93970 EXTREMITY STUDY: CPT | Performed by: SURGERY

## 2024-06-24 PROCEDURE — 93306 TTE W/DOPPLER COMPLETE: CPT

## 2024-06-24 PROCEDURE — 85025 COMPLETE CBC W/AUTO DIFF WBC: CPT | Performed by: FAMILY MEDICINE

## 2024-06-24 PROCEDURE — 87449 NOS EACH ORGANISM AG IA: CPT | Performed by: FAMILY MEDICINE

## 2024-06-24 PROCEDURE — 80053 COMPREHEN METABOLIC PANEL: CPT | Performed by: FAMILY MEDICINE

## 2024-06-24 PROCEDURE — 93306 TTE W/DOPPLER COMPLETE: CPT | Performed by: INTERNAL MEDICINE

## 2024-06-24 PROCEDURE — 93970 EXTREMITY STUDY: CPT

## 2024-06-24 RX ORDER — CEFTRIAXONE 1 G/50ML
1000 INJECTION, SOLUTION INTRAVENOUS EVERY 24 HOURS
Status: DISCONTINUED | OUTPATIENT
Start: 2024-06-24 | End: 2024-06-25 | Stop reason: HOSPADM

## 2024-06-24 RX ORDER — AZITHROMYCIN 250 MG/1
500 TABLET, FILM COATED ORAL EVERY 24 HOURS
Status: DISCONTINUED | OUTPATIENT
Start: 2024-06-25 | End: 2024-06-25 | Stop reason: HOSPADM

## 2024-06-24 RX ADMIN — ATORVASTATIN CALCIUM 40 MG: 40 TABLET, FILM COATED ORAL at 16:25

## 2024-06-24 RX ADMIN — CEFTRIAXONE 1000 MG: 1 INJECTION, SOLUTION INTRAVENOUS at 09:13

## 2024-06-24 RX ADMIN — PANTOPRAZOLE SODIUM 40 MG: 40 TABLET, DELAYED RELEASE ORAL at 05:23

## 2024-06-24 RX ADMIN — HEPARIN SODIUM 16 UNITS/KG/HR: 10000 INJECTION, SOLUTION INTRAVENOUS at 19:11

## 2024-06-24 RX ADMIN — AZITHROMYCIN MONOHYDRATE 500 MG: 500 INJECTION, POWDER, LYOPHILIZED, FOR SOLUTION INTRAVENOUS at 09:13

## 2024-06-24 RX ADMIN — HEPARIN SODIUM 16 UNITS/KG/HR: 10000 INJECTION, SOLUTION INTRAVENOUS at 02:03

## 2024-06-24 RX ADMIN — GABAPENTIN 300 MG: 300 CAPSULE ORAL at 16:25

## 2024-06-24 RX ADMIN — GABAPENTIN 300 MG: 300 CAPSULE ORAL at 09:12

## 2024-06-24 RX ADMIN — Medication 7.5 MG: at 03:15

## 2024-06-24 RX ADMIN — GABAPENTIN 300 MG: 300 CAPSULE ORAL at 21:46

## 2024-06-24 NOTE — PROGRESS NOTES
The azithromycin has / have been converted to Oral per [facility] IV-to-PO Auto-Conversion Protocol for Adults as approved by the Pharmacy and Therapeutics Committee. The patient met all eligible criteria:  1) Age = 18 years old   2) Received at least one dose of the IV form   3) Receiving at least one other scheduled oral/enteral medication   4) Tolerating an oral/enteral diet   and did not have any exclusions:   1) Critical care patient   2) Active GI bleed (IF assessing H2RAs or PPIs)   3) Continuous tube feeding (IF assessing cipro, doxycycline, levofloxacin, minocycline, rifampin, or voriconazole)   4) Receiving PO vancomycin (IF assessing metronidazole)   5) Persistent nausea and/or vomiting   6) Ileus or gastrointestinal obstruction   7) Angelika/nasogastric tube set for continuous suction   8) Specific order not to automatically convert to PO (in the order's comments or if discussed in the most recent Infectious Disease or primary team's progress notes).

## 2024-06-24 NOTE — UTILIZATION REVIEW
Initial Clinical Review    Admission: Date/Time/Statement:   Admission Orders (From admission, onward)       Ordered        06/23/24 1119  INPATIENT ADMISSION  Once                          Orders Placed This Encounter   Procedures    INPATIENT ADMISSION     Standing Status:   Standing     Number of Occurrences:   1     Order Specific Question:   Level of Care     Answer:   Med Surg [16]     Order Specific Question:   Estimated length of stay     Answer:   More than 2 Midnights     Order Specific Question:   Certification     Answer:   I certify that inpatient services are medically necessary for this patient for a duration of greater than two midnights. See H&P and MD Progress Notes for additional information about the patient's course of treatment.     ED Arrival Information       Expected   -    Arrival   6/23/2024 07:41    Acuity   Emergent              Means of arrival   Ambulance    Escorted by   [facility]    Service   Hospitalist    Admission type   Emergency              Arrival complaint   chest pain             Chief Complaint   Patient presents with    Chest Pain     Patient complains of midsternal chest pain that radiates into his left side that started last night. Patient states when he takes a deep breath the pain is worse. EMS gave aspirin and 1 nitro. Patients pain is 7/10 at this time.        Initial Presentation: 63 y.o. male to the ED from home via EMS with complaints of chest pain radiating to left side.  Admitted to inpatient for   Bilateral PE. H/O opiate dependence, chronic neck pain status post cervical fusion, provoked DVT 10 years ago, GERD.  Arrives with chest pain, tachycardia, pulse ox 91% on room air. EMS gave asa.  D-dimer 0.80.  CT chest shows: bilateral PEs.  In the ED given IV heparin bolus, started on Heparin drip. Check ECHo, thrombosis panel. Tender to palpation on left side of chest, around ribs.   Anticipated Length of Stay/Certification Statement: Patient will be  admitted on an inpatient basis with an anticipated length of stay of greater than 2 midnights secondary to IV heparin, diagnostic workup.     Date: 6/24   Day 2:  Lungs clear.  IV Heparin drip continues. LLL pneumonia noted.  Started on IV abx, check urin legionella and pneumococcal antigen. MOnitor fever curve.     Pulmonary consult:  This is his second VTE.  REcommend lifelong anticoag.  Continue CTX while inpatient.  Trend wbcs. Unclear provoking factors for new VTE although chronic appearing DVT in RLE was noted in patient has prior history of DVT in the RLE that was felt to be provoked at that time. Effusion too small for thoracentesis.        Date: 6/25  Day 3: Has surpassed a 2nd midnight with active treatments and services. Initially admitted for bilateral PE, started on IV heparin, ECHo shows no strain.  Transitioned to Xarelto.        ED Triage Vitals   Temperature Pulse Respirations Blood Pressure SpO2 Pain Score   06/23/24 0741 06/23/24 0741 06/23/24 0741 06/23/24 0744 06/23/24 0741 06/23/24 0741   99.7 °F (37.6 °C) 105 18 146/84 91 % 7     Weight (last 2 days)       Date/Time Weight    06/25/24 0545 94.3 (207.89)    06/24/24 07:43:06 93.4 (205.91)    06/24/24 0600 93.4 (205.91)    06/23/24 11:54:41 93.6 (206.35)    06/23/24 1046 97.2 (214.29)            Vital Signs (last 3 days)       Date/Time Temp Pulse Resp BP MAP (mmHg) SpO2 O2 Device Patient Position - Orthostatic VS Vita Coma Scale Score Pain    06/25/24 07:43:50 98 °F (36.7 °C) 64 -- 124/73 90 93 % -- -- -- --    06/25/24 07:17:44 98.2 °F (36.8 °C) 70 18 141/81 101 92 % -- -- -- --    06/24/24 2358 -- -- -- -- -- -- None (Room air) -- -- No Pain    06/24/24 19:08:20 99.6 °F (37.6 °C) 78 18 121/74 90 94 % -- -- -- --    06/24/24 15:31:05 99.3 °F (37.4 °C) 83 14 124/74 91 92 % -- -- -- --    06/24/24 1156 -- -- -- -- -- 94 % None (Room air) -- 15 --    06/24/24 11:03:54 98.2 °F (36.8 °C) 85 16 132/69 90 92 % -- -- -- --    06/24/24 07:43:06 --  64 -- 104/55 71 94 % -- -- -- --    06/24/24 07:26:25 98.3 °F (36.8 °C) 71 -- 113/80 91 93 % -- -- -- --    06/24/24 0315 -- -- -- -- -- -- -- -- -- 7 06/24/24 03:13:36 99.1 °F (37.3 °C) 77 17 135/78 97 92 % None (Room air) Sitting -- --    06/24/24 01:43:35 99.3 °F (37.4 °C) 82 -- -- -- 91 % -- -- -- --    06/24/24 00:25:20 100.2 °F (37.9 °C) 78 16 116/72 87 93 % None (Room air) Lying -- --    06/23/24 1922 -- -- -- -- -- -- -- -- -- 5 06/23/24 1917 -- -- -- -- -- -- None (Room air) -- -- --    06/23/24 19:13:12 98.4 °F (36.9 °C) 78 16 133/73 93 97 % None (Room air) Lying -- --    06/23/24 14:33:42 99.4 °F (37.4 °C) 75 14 144/78 100 95 % None (Room air) -- -- --    06/23/24 1230 -- -- -- -- -- -- -- -- -- 5 06/23/24 1208 -- -- -- -- -- 95 % None (Room air) -- -- 5 06/23/24 11:54:41 98.2 °F (36.8 °C) 75 15 130/82 98 94 % None (Room air) Sitting -- --    06/23/24 11:53:59 98.2 °F (36.8 °C) -- 16 130/82 98 -- -- -- -- --    06/23/24 1130 -- 73 15 141/73 100 96 % None (Room air) Sitting -- --    06/23/24 1100 -- 73 16 150/84 108 95 % None (Room air) Sitting -- --    06/23/24 0945 -- 77 16 150/82 109 94 % None (Room air) Sitting -- 5    06/23/24 0900 -- 80 17 130/79 96 94 % None (Room air) Sitting -- --    06/23/24 0830 -- 79 18 128/79 98 93 % None (Room air) Sitting -- --    06/23/24 0801 -- -- -- -- -- -- -- -- 15 --    06/23/24 0800 -- 90 15 137/80 101 92 % None (Room air) Sitting -- --    06/23/24 0759 -- -- -- -- -- -- None (Room air) -- -- --    06/23/24 0744 -- -- -- 146/84 -- -- -- -- -- --    06/23/24 0741 99.7 °F (37.6 °C) 105 18 -- -- 91 % None (Room air) -- -- 7            Pertinent Labs/Diagnostic Test Results:   Radiology:   VAS VENOUS DUPLEX - LOWER LIMB BILATERAL   CONCLUSION:  Impression:  RIGHT LOWER LIMB:  No evidence of acute deep vein thrombosis.  Evidence of chronic non-occlusive deep vein thrombosis noted in one of the  paired peroneal veins.  No evidence of superficial  thrombophlebitis noted.  Venous reflux noted in one of the paired peroneal veins, suggestive of deep  venous insufficiency.  Popliteal, posterior tibial and anterior tibial arterial Doppler waveforms are  triphasic.     LEFT LOWER LIMB:  No evidence of acute or chronic deep vein thrombosis.  Evidence of subacute vs chronic superficial thrombophlebitis noted in the mid  and distal calf greater saphenous vein.  Doppler evaluation shows a normal response to augmentation maneuvers.  Popliteal, posterior tibial and anterior tibial arterial Doppler waveforms are  triphasic.      PE Study with CT abdomen & pelvis with contrast   Final Interpretation by Augustine Addison MD (06/23 1022)         1. Small peripheral bilateral pulmonary arterial filling defects consistent with acute pulmonary emboli. Somewhat elevated RV/LV ratio at 1.03.   2. Left bibasilar atelectasis. More focal dependent left lower lobe opacification could also represent pneumonia. Small loculated left pleural effusion.   3. Mildly enlarged mediastinal and left hilar lymph nodes, likely reactive in nature.   4. No acute abnormality identified in the abdomen or pelvis.   5. Additional findings as noted.         I personally discussed this study with KRISTY CHAVIRA on 6/23/2024 10:22 AM.                           Workstation performed: [id number]         XR chest 2 views   Final Interpretation by Deena Bass MD (06/23 0828)      Low lung volumes with bibasilar opacity which could be due to atelectasis. Pneumonia/aspiration not excluded in the appropriate clinical setting.               Workstation performed: [id number]           Cardiology:  Echo complete w/ contrast if indicated   Final Result by Stanley Cartagena MD (06/24 7993)        Left Ventricle: Left ventricular cavity size is normal. Wall thickness    is normal. The left ventricular ejection fraction is 55%. Systolic    function is normal. Wall motion is normal. Diastolic function  is normal.     Left Atrium: The atrium is mildly dilated.     Aortic Valve: There is aortic valve sclerosis.     Mitral Valve: There is mild regurgitation.     Tricuspid Valve: There is mild regurgitation.     Pulmonary Artery: The estimated pulmonary artery systolic pressure is    28.0 mmHg.         ECG 12 lead   Final Result by Stanley Cartagena MD (06/24 0855)   Sinus tachycardia   Possible Left atrial enlargement   Septal infarct (cited on or before 19-FEB-2024)   Abnormal ECG   When compared with ECG of 19-FEB-2024 12:15,   Vent. rate has increased BY  39 BPM   Confirmed by Stanley Cartagena ([id number]) on 6/24/2024 8:55:25 AM              Results from last 7 days   Lab Units 06/25/24  0621 06/24/24  0732 06/23/24  1100 06/23/24  0745   WBC Thousand/uL 7.57 8.40 9.08 7.47   HEMOGLOBIN g/dL 13.9 13.2 12.9 13.2   HEMATOCRIT % 41.7 40.5 38.7 39.5   PLATELETS Thousands/uL 236 217 192 201   TOTAL NEUT ABS Thousands/µL 4.23 4.97  --  4.31         Results from last 7 days   Lab Units 06/25/24  0621 06/24/24  0732 06/23/24  0745   SODIUM mmol/L 139 137 139   POTASSIUM mmol/L 4.1 4.1 4.0   CHLORIDE mmol/L 103 102 102   CO2 mmol/L 27 27 27   ANION GAP mmol/L 9 8 10   BUN mg/dL 14 13 15   CREATININE mg/dL 1.09 1.09 1.21   EGFR ml/min/1.73sq m 70 70 62   CALCIUM mg/dL 10.0 9.7 9.7     Results from last 7 days   Lab Units 06/24/24  0732 06/23/24  0745   AST U/L 16 15   ALT U/L 19 21   ALK PHOS U/L 63 63   TOTAL PROTEIN g/dL 7.5 7.6   ALBUMIN g/dL 4.4 4.6   TOTAL BILIRUBIN mg/dL 0.76 0.79         Results from last 7 days   Lab Units 06/25/24  0621 06/24/24  0732 06/23/24  0745   GLUCOSE RANDOM mg/dL 102 101 110         Results from last 7 days   Lab Units 06/23/24  1230 06/23/24  0955 06/23/24  0745   HS TNI 0HR ng/L  --   --  3   HS TNI 2HR ng/L  --  3  --    HSTNI D2 ng/L  --  0  --    HS TNI 4HR ng/L 3  --   --    HSTNI D4 ng/L 0  --   --      Results from last 7 days   Lab Units 06/23/24  0745   D-DIMER QUANTITATIVE ug/ml FEU  0.80*     Results from last 7 days   Lab Units 06/25/24  0541 06/24/24  0732 06/24/24  0031 06/23/24  1729 06/23/24  1100   PROTIME seconds  --   --   --   --  13.4   INR   --   --   --   --  1.03   PTT seconds 67* 85* 83*   < > 28    < > = values in this interval not displayed.       Results from last 7 days   Lab Units 06/23/24  0745   BNP pg/mL 9       Results from last 7 days   Lab Units 06/23/24  0745   LIPASE u/L 18         ED Treatment-Medication Administration from 06/23/2024 0741 to 06/23/2024 1144         Date/Time Order Dose Route Action     06/23/2024 0941 iohexol (OMNIPAQUE) 350 MG/ML injection (MULTI-DOSE) 100 mL 100 mL Intravenous Given     06/23/2024 1128 heparin (porcine) injection 7,600 Units 7,600 Units Intravenous Given     06/23/2024 1128 heparin (porcine) 25,000 units in 0.45% NaCl 250 mL infusion (premix) 18 Units/kg/hr Intravenous New Bag            Past Medical History:   Diagnosis Date    Arthritis     Benign prostatic hyperplasia with incomplete bladder emptying 02/28/2024    Chronic pain disorder     COVID 2021    GERD (gastroesophageal reflux disease)     Spinal cord dysplasia (HCC)          Admitting Diagnosis: Pleuritic chest pain [R07.81]  Chest pain [R07.9]  Multiple subsegmental pulmonary emboli without acute cor pulmonale (HCC) [I26.94]  Age/Sex: 63 y.o. male  Admission Orders:  Scheduled Medications:  atorvastatin, 40 mg, Oral, Daily With Dinner  [START ON 6/25/2024] azithromycin, 500 mg, Oral, Q24H  cefTRIAXone, 1,000 mg, Intravenous, Q24H  gabapentin, 300 mg, Oral, TID  lidocaine, 1 patch, Topical, Daily  pantoprazole, 40 mg, Oral, Early Morning      Continuous IV Infusions:       PRN Meds:  ondansetron, 4 mg, Intravenous, Q6H PRN  oxyCODONE, 5 mg, Oral, Q6H PRN   Or  oxyCODONE, 7.5 mg, Oral, Q6H PRN        IP CONSULT TO PULMONOLOGY    Network Utilization Review Department  ATTENTION: Please call with any questions or concerns to [phone] and carefully listen to the prompts  so that you are directed to the right person. All voicemails are confidential.   For Discharge needs, contact Care Management DC Support Team at [phone] opt. [phone]  Send all requests for admission clinical reviews, approved or denied determinations and any other requests to dedicated fax number below belonging to the campus where the patient is receiving treatment. List of dedicated fax numbers for the Facilities:  FACILITY NAME UR FAX NUMBER   ADMISSION DENIALS (Administrative/Medical Necessity) [phone]   DISCHARGE SUPPORT TEAM (NETWORK) [phone]   PARENT CHILD HEALTH (Maternity/NICU/Pediatrics) [phone]   [facility] [phone]   [facility] [phone]   [facility] [phone]   [facility] [phone]   [facility] [phone]   [facility] [phone]   [facility] [phone]   [facility] [phone]   [facility] [phone]   [facility] [phone]   [facility] [phone]   [facility] [phone]

## 2024-06-24 NOTE — CONSULTS
Consultation - Pulmonary Medicine   Vini Ybarra 63 y.o. male MRN: [id number]  Unit/Bed#: 404-01 Encounter: [id number]      Assessment:    Acute bilateral PE without cor pulmonale   Left lower lobe pneumonia  Mediastinal/hilar lymphadenopathy  Trace left effusion  Chronic opioid use    Pulmonary recommendations/plan:    Monitor SpO2- maintain greater than 90%.   Continue heparin gtt for now, goal PTT 60-90 sec. Transition to Eliquis or other DOAC equivalent (depending on insurance coverage) tomorrow.   This is his second VTE- recommend lifelong anticoagulation.  Continue CTX while inpatient- can transition to p.o. equivalent/cefdinir at discharge to complete 5-day course.  Discontinue azithromycin given low drip score  Trend WBC, procalcitonin, temps  No indication for thoracentesis at this time  Repeat CT chest w contrast in 2-3 months  Further management per primary team      History of Present Illness   Physician Requesting Consult: Raghu Scott MD  Reason for Consult / Principal Problem: PE  Hx and PE limited by: n/a  Chief Complaint: chest pain  HPI: Vini Ybarra is a 63 y.o.  male who presented to [facility] with complaints of chest pain that progressively got worse over 24 hours.  Patient has a past medical history positive for GERD, osteoarthritis, chronic opioid dependence, myelomalacia of the cervical cord status post cervical fusion 10 years ago.  Patient reports that he was feeling more rundown over the last week originally attributing it to the heat and humidity.  Denies significant cough, sputum production, hemoptysis or wheeze.  Denies shortness of breath.  Saturday into Sunday he did start to notice increased pain in left side of his chest that reached around to his back/flank area on the left side.  Pain was worse with deep breaths and with movement.  At first he thought this was musculoskeletal pain did not go away and actually significantly increased Sunday morning  prompting him to call EMS.  Denies sick contacts but does report that his dog was sick with GI symptoms recently although likely not pertinent.  On arrival in the ED he was noted to be hemodynamically stable but initial blood work revealed elevated D-dimer prompting CTA chest PE study that showed bilateral PE, left lower lobe infiltrates, loculated left pleural effusion and mediastinal/hilar lymphadenopathy.  Patient was admitted for PE and pneumonia started on blood thinners.  Pulmonary was consulted to help manage this.    Patient does not follow with pulmonary at baseline.  Denies chronic lung disease.  Does not use inhalers, nebulizers, supplemental oxygen at baseline.  He is a former smoker with a quit date 10 years ago.  Prior to that reports smoking for 35 years although states it was light smoking stanozolol a few cigarettes a day on average.  Patient worked at [employer] with exposures to asbestos pipes. 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 Vini  tells me that this is not this first time he has has a blood clot. He reports right-sided DVT approximately 10 years ago provoked in the setting of cervical spine surgery at the time. He was treated with Xarelto for 3 months then stopped.  She denies personal history of malignancy.  Denies personal or family history of hypercoagulable condition.  Denies recent travel.  Denies recent traumas or surgeries.    Inpatient consult to Pulmonology  Consult performed by: Dk Crawford PA-C  Consult ordered by: Beena Root MD          Review of Systems   All other systems reviewed and are negative.      Historical Information   Past Medical History:   Diagnosis Date    Arthritis     Benign prostatic hyperplasia with incomplete bladder emptying 02/28/2024    Chronic pain disorder     COVID 2021    GERD (gastroesophageal reflux disease)     Spinal cord dysplasia (HCC)      Past Surgical History:   Procedure Laterality Date    CERVICAL FUSION      EGD      HERNIA  REPAIR Left     NASAL SEPTOPLASTY W/ TURBINOPLASTY  08/22/2003    NJ BX PROSTATE STRTCTC SATURATION SAMPLING IMG GID N/A 11/22/2023    Procedure: TRANSPERINEAL ULTRASOUND GUIDED BIOPSY PROSTATE;  Surgeon: Luis Antonio Rodrigues MD;  Location: [facility];  Service: Urology    NJ TRURL ELECTROSURG RESCJ PROSTATE BLEED COMPLETE N/A 2/28/2024    Procedure: TRANSURETHRAL RESECTION OF PROSTATE (TURP);  Surgeon: Luis Antonio Rodrigues MD;  Location: [facility];  Service: Urology    TOOTH EXTRACTION       Social History   Social History     Substance and Sexual Activity   Alcohol Use Yes    Comment: couple beers a week     Social History     Substance and Sexual Activity   Drug Use Not Currently     Social History     Tobacco Use   Smoking Status Former    Average packs/day: 0.5 packs/day for 40.0 years (20.0 ttl pk-yrs)    Types: Cigarettes    Start date: 1974    Passive exposure: Past   Smokeless Tobacco Never     E-Cigarette/Vaping    E-Cigarette Use Never User      E-Cigarette/Vaping Substances    Nicotine No     THC No     CBD No     Flavoring No     Other No     Unknown No      Occupational History: see above    Family History:   Family History   Problem Relation Age of Onset    Cancer Father     Lung cancer Brother     Cancer Son        Meds/Allergies   all current active meds have been reviewed, pertinent pulmonary meds have been reviewed, and current meds:   Current Facility-Administered Medications   Medication Dose Route Frequency    atorvastatin (LIPITOR) tablet 40 mg  40 mg Oral Daily With Dinner    [START ON 6/25/2024] azithromycin (ZITHROMAX) tablet 500 mg  500 mg Oral Q24H    cefTRIAXone (ROCEPHIN) IVPB (premix in dextrose) 1,000 mg 50 mL  1,000 mg Intravenous Q24H    gabapentin (NEURONTIN) capsule 300 mg  300 mg Oral TID    heparin (porcine) 25,000 units in 0.45% NaCl 250 mL infusion (premix)  3-30 Units/kg/hr (Order-Specific) Intravenous Titrated    heparin (porcine) injection 3,800 Units  3,800 Units Intravenous Q6H PRN     "heparin (porcine) injection 7,600 Units  7,600 Units Intravenous Q6H PRN    lidocaine (LIDODERM) 5 % patch 1 patch  1 patch Topical Daily    ondansetron (ZOFRAN) injection 4 mg  4 mg Intravenous Q6H PRN    oxyCODONE (ROXICODONE) IR tablet 5 mg  5 mg Oral Q6H PRN    Or    oxyCODONE (ROXICODONE) split tablet 7.5 mg  7.5 mg Oral Q6H PRN    pantoprazole (PROTONIX) EC tablet 40 mg  40 mg Oral Early Morning       Allergies   Allergen Reactions    Acetaminophen Other (See Comments)     Other reaction(s): TONGUE LIPS NOSE TINGLING SENSAT         Objective   Vitals: Blood pressure 132/69, pulse 85, temperature 98.2 °F (36.8 °C), resp. rate 16, height 5' 10\" (1.778 m), weight 93.4 kg (205 lb 14.6 oz), SpO2 92%.  Room air,Body mass index is 29.54 kg/m².    Intake/Output Summary (Last 24 hours) at 6/24/2024 1120  Last data filed at 6/24/2024 0935  Gross per 24 hour   Intake 1680 ml   Output 2300 ml   Net -620 ml     Invasive Devices       Peripheral Intravenous Line  Duration             Peripheral IV 06/23/24 Right Antecubital 1 day    Peripheral IV 06/23/24 Left;Ventral (anterior) Forearm <1 day                    Physical Exam  Vitals and nursing note reviewed.   Constitutional:       General: He is not in acute distress.     Appearance: Normal appearance.   HENT:      Head: Normocephalic and atraumatic.      Mouth/Throat:      Mouth: Mucous membranes are moist.      Pharynx: Oropharynx is clear.   Cardiovascular:      Rate and Rhythm: Normal rate and regular rhythm.      Heart sounds: No murmur heard.  Pulmonary:      Effort: Pulmonary effort is normal.      Breath sounds: Rales (few crackles left>right base) present. No wheezing.   Musculoskeletal:      Cervical back: Normal range of motion.   Skin:     General: Skin is warm and dry.   Neurological:      General: No focal deficit present.      Mental Status: He is alert. Mental status is at baseline.   Psychiatric:         Mood and Affect: Mood normal.         Behavior: " "Behavior normal.         Lab Results: I have personally reviewed pertinent lab results., ABG: No results found for: \"PHART\", \"PJM6JCQ\", \"PO2ART\", \"EPP2CRO\", \"J7EAGANZ\", \"BEART\", \"SOURCE\", BNP: No results found for: \"BNP\", CBC:   Lab Results   Component Value Date    WBC 8.40 06/24/2024    HGB 13.2 06/24/2024    HCT 40.5 06/24/2024    MCV 93 06/24/2024     06/24/2024    RBC 4.38 06/24/2024    MCH 30.1 06/24/2024    MCHC 32.6 06/24/2024    RDW 13.1 06/24/2024    MPV 10.6 06/24/2024    NRBC 0 06/24/2024   , CMP:   Lab Results   Component Value Date    SODIUM 137 06/24/2024    K 4.1 06/24/2024     06/24/2024    CO2 27 06/24/2024    BUN 13 06/24/2024    CREATININE 1.09 06/24/2024    CALCIUM 9.7 06/24/2024    AST 16 06/24/2024    ALT 19 06/24/2024    ALKPHOS 63 06/24/2024    EGFR 70 06/24/2024   , PT/INR: No results found for: \"PT\", \"INR\", Troponin: No results found for: \"TROPONINI\"    D-Dimer: 0.80    BNP: 9    Imaging Studies: I have personally reviewed pertinent reports.   and I have personally reviewed pertinent films in PACS     CTA chest PE study with CT abdomen pelvis with contrast 6/23/2024  Bilateral pulmonary artery filling defects including right lower lobe, left lower lobe, inferior lingula.  RV/LV ratio 1.03.  Trace loculated pleural effusion on left.  Small cyst.  Centimeter left lower lobe calcified granuloma.  Dependent atelectasis in the lung bases present.  More ill-defined opacification of the left lower lobe also noted.  Mildly enlarged heart.  Coronary artery calcification present.  Mildly prominent mediastinal and left hilar lymph nodes.      EKG, Pathology, and Other Studies: I have personally reviewed pertinent reports.       Echocardiogram 6/20/2024  EF 55%.  Diastolic function normal.  Right ventricular size and systolic function normal.  Mild mitral regurgitation.  Mild tricuspid regurgitation.    Pulmonary Results (PFTs, PSG): none to review      Code Status: Level 1 - Full " "Code    Portions of the record may have been created with voice recognition software.  Occasional wrong word or \"sound a like\" substitutions may have occurred due to the inherent limitations of voice recognition software.  Read the chart carefully and recognize, using context, where substitutions have occurred.    "

## 2024-06-24 NOTE — ASSESSMENT & PLAN NOTE
This is a 62 yo M with history of DVT approximately 10 yrs ago considered provoked from cervical fusion surgery, history of chronic opiate pain medication dependence, GERD who presents with 2-day history of pleuritic chest and left-sided pain      Continue heparin infusion VTE protocol  Follow up 2D echo

## 2024-06-24 NOTE — PROGRESS NOTES
"[facility]  Progress Note  Name: Vini Ybarra I  MRN: [id number]  Unit/Bed#: 404-01 I Date of Admission: 6/23/2024   Date of Service: 6/24/2024 I Hospital Day: 1    Assessment & Plan   * Bilateral pulmonary embolism (HCC)  Assessment & Plan  This is a 62 yo M with history of DVT approximately 10 yrs ago considered provoked from cervical fusion surgery, history of chronic opiate pain medication dependence, GERD who presents with 2-day history of pleuritic chest and left-sided pain      Continue heparin infusion VTE protocol  Follow up 2D echo      Left lower lobe pneumonia  Assessment & Plan  Start ceftriaxone and azithromycin  Check urine Legionella and pneumococcal antigen  Monitor fever curve    Continuous opioid dependence (HCC)  Assessment & Plan  Continue opiate pain medication  Given acute pleuritic chest pain in setting of pulmonary emboli, will increase frequency and available dosing for pain control    Myelomalacia of cervical cord (HCC)  Assessment & Plan  Status post cervical fusion  Provide analgesia             Progress Note - Vini Ybarra 63 y.o. male MRN: [id number]    Unit/Bed#: 404-01 Encounter: [id number]        Subjective:   Patient seen and examined, complains of left sided pleuritic pain (although it is improving)  No cough     Objective:     Vitals:   Vitals:    06/24/24 0726   BP: 113/80   Pulse: 71   Resp:    Temp: 98.3 °F (36.8 °C)   SpO2: 93%     Body mass index is 29.54 kg/m².    Intake/Output Summary (Last 24 hours) at 6/24/2024 0740  Last data filed at 6/24/2024 0318  Gross per 24 hour   Intake 1440 ml   Output 2300 ml   Net -860 ml       Physical Exam:   /80   Pulse 71   Temp 98.3 °F (36.8 °C)   Resp 17   Ht 5' 10\" (1.778 m)   Wt 93.4 kg (205 lb 14.6 oz)   SpO2 93%   BMI 29.54 kg/m²   General appearance: alert and oriented, in no acute distress  Head: Normocephalic, without obvious abnormality, atraumatic  Lungs: clear to auscultation " bilaterally  Heart: regular rate and rhythm, S1, S2 normal, no murmur, click, rub or gallop  Abdomen: soft, non-tender; bowel sounds normal; no masses,  no organomegaly  Extremities: extremities normal, warm and well-perfused; no cyanosis, clubbing, or edema  Pulses: 2+ and symmetric  Neurologic: Grossly normal     Invasive Devices       Peripheral Intravenous Line  Duration             Peripheral IV 06/23/24 Left;Ventral (anterior) Forearm <1 day    Peripheral IV 06/23/24 Right Antecubital <1 day                    Results from last 7 days   Lab Units 06/23/24  1100 06/23/24  0745   WBC Thousand/uL 9.08 7.47   HEMOGLOBIN g/dL 12.9 13.2   HEMATOCRIT % 38.7 39.5   PLATELETS Thousands/uL 192 201       Results from last 7 days   Lab Units 06/23/24  0745   POTASSIUM mmol/L 4.0   CHLORIDE mmol/L 102   CO2 mmol/L 27   BUN mg/dL 15   CREATININE mg/dL 1.21   CALCIUM mg/dL 9.7   ALK PHOS U/L 63   ALT U/L 21   AST U/L 15       Medication Administration - last 24 hours from 06/23/2024 0740 to 06/24/2024 0740         Date/Time Order Dose Route Action Action by     06/23/2024 0941 EDT iohexol (OMNIPAQUE) 350 MG/ML injection (MULTI-DOSE) 100 mL 100 mL Intravenous Given Evelia Ren     06/23/2024 1225 EDT heparin (VTE/PE) high 0 Units Intravenous Hold Salvador Neff LPN     06/23/2024 1128 EDT heparin (porcine) injection 7,600 Units 7,600 Units Intravenous Given Nettie Skaggs RN     06/24/2024 0715 EDT heparin (porcine) 25,000 units in 0.45% NaCl 250 mL infusion (premix) 16 Units/kg/hr Intravenous Rate/Dose Verify Rolf Roland Reyes, RN     06/24/2024 0203 EDT heparin (porcine) 25,000 units in 0.45% NaCl 250 mL infusion (premix) 16 Units/kg/hr Intravenous New Bag Rolf Roland Reyes, RN     06/23/2024 1833 EDT heparin (porcine) 25,000 units in 0.45% NaCl 250 mL infusion (premix) 16 Units/kg/hr Intravenous Rate/Dose Change Tanner Cramer RN     06/23/2024 1145 EDT heparin (porcine) 25,000 units in 0.45% NaCl 250 mL infusion  (premix) 18 Units/kg/hr Intravenous Rate/Dose Verify Tanner Cramer, KENNEDY     06/23/2024 1128 EDT heparin (porcine) 25,000 units in 0.45% NaCl 250 mL infusion (premix) 18 Units/kg/hr Intravenous New Bag Nettie Skaggs, KENNEDY     06/24/2024 0523 EDT pantoprazole (PROTONIX) EC tablet 40 mg 40 mg Oral Given Rolf Roland Reyes, RN     06/23/2024 1322 EDT pantoprazole (PROTONIX) EC tablet 40 mg 40 mg Oral Not Given Salvadorgriselda Neff LPN     06/23/2024 1638 EDT atorvastatin (LIPITOR) tablet 40 mg 40 mg Oral Given Salvadorgriselda Neff LPN     06/23/2024 2046 EDT gabapentin (NEURONTIN) capsule 300 mg 300 mg Oral Given Rolf Roland Reyes, RN     06/23/2024 1322 EDT gabapentin (NEURONTIN) capsule 300 mg 300 mg Oral Not Given Salvador Neff LPN     06/24/2024 0315 EDT oxyCODONE (ROXICODONE) IR tablet 5 mg -- Oral See Alternative Rolf Roland Reyes, RN     06/23/2024 1922 EDT oxyCODONE (ROXICODONE) IR tablet 5 mg 5 mg Oral Given Rolf Roland Reyes, RN     06/24/2024 0315 EDT oxyCODONE (ROXICODONE) split tablet 7.5 mg 7.5 mg Oral Given Rolf Roland Reyes, RN     06/23/2024 1922 EDT oxyCODONE (ROXICODONE) split tablet 7.5 mg -- Oral See Alternative Rolf Roland Reyes, RN     06/23/2024 1321 EDT lidocaine (LIDODERM) 5 % patch 1 patch 1 patch Topical Not Given Salvador Neff LPN              Lab, Imaging and other studies: I have personally reviewed pertinent reports.    VTE Pharmacologic Prophylaxis: heparin GTT  VTE Mechanical Prophylaxis: sequential compression device     Raghu Scott MD  6/24/2024,7:40 AM

## 2024-06-24 NOTE — CASE MANAGEMENT
Case Management Discharge Planning Note    Patient name Vini Ybarra  Location /404-01 MRN [id number]  : 1960 Date 2024       Current Admission Date: 2024  Current Admission Diagnosis:Bilateral pulmonary embolism (HCC)   Patient Active Problem List    Diagnosis Date Noted Date Diagnosed    Left lower lobe pneumonia 2024     Bilateral pulmonary embolism (HCC) 2024     Continuous opioid dependence (HCC) 2024     Tick bite of right upper arm 2024     BPH without obstruction/lower urinary tract symptoms 2024     Eosinophilic esophagitis 2024     Gastroesophageal reflux disease 2023     Epigastric pain 2023     Esophageal dysphagia 10/20/2023     Chronic hand pain, right 2023     Arthritis 2023     Myelomalacia of cervical cord (HCC) 2023     Myelopathy (HCC) 2023       LOS (days): 1  Geometric Mean LOS (GMLOS) (days): 4  Days to GMLOS:2.9     OBJECTIVE:  Risk of Unplanned Readmission Score: 8.39         Current admission status: Inpatient   Preferred Pharmacy:   [id number] [place] [address] [address]  Phone: [phone] Fax: [phone]    Primary Care Provider: Gui Espinosa DO    Primary Insurance: ADA  REP  Secondary Insurance:     DISCHARGE DETAILS:        Chart review completed.  CM to follow for any discharge needs.

## 2024-06-24 NOTE — ASSESSMENT & PLAN NOTE
Start ceftriaxone and azithromycin  Check urine Legionella and pneumococcal antigen  Monitor fever curve

## 2024-06-25 ENCOUNTER — TELEPHONE (OUTPATIENT)
Dept: HEMATOLOGY ONCOLOGY | Facility: CLINIC | Age: 64
End: 2024-06-25

## 2024-06-25 ENCOUNTER — TRANSITIONAL CARE MANAGEMENT (OUTPATIENT)
Dept: FAMILY MEDICINE CLINIC | Facility: CLINIC | Age: 64
End: 2024-06-25

## 2024-06-25 VITALS
SYSTOLIC BLOOD PRESSURE: 124 MMHG | BODY MASS INDEX: 29.76 KG/M2 | DIASTOLIC BLOOD PRESSURE: 73 MMHG | TEMPERATURE: 98 F | WEIGHT: 207.89 LBS | RESPIRATION RATE: 18 BRPM | HEIGHT: 70 IN | HEART RATE: 64 BPM | OXYGEN SATURATION: 93 %

## 2024-06-25 LAB
ANION GAP SERPL CALCULATED.3IONS-SCNC: 9 MMOL/L (ref 4–13)
APTT PPP: 67 SECONDS (ref 23–37)
B2 GLYCOPROT1 IGA SERPL IA-ACNC: 0.8
B2 GLYCOPROT1 IGG SERPL IA-ACNC: 1.4
B2 GLYCOPROT1 IGM SERPL IA-ACNC: <2.4
BASOPHILS # BLD AUTO: 0.08 THOUSANDS/ÂΜL (ref 0–0.1)
BASOPHILS NFR BLD AUTO: 1 % (ref 0–1)
BUN SERPL-MCNC: 14 MG/DL (ref 5–25)
CALCIUM SERPL-MCNC: 10 MG/DL (ref 8.4–10.2)
CARDIOLIPIN IGA SER IA-ACNC: 1.9
CARDIOLIPIN IGG SER IA-ACNC: 5.9
CARDIOLIPIN IGM SER IA-ACNC: 7.4
CHLORIDE SERPL-SCNC: 103 MMOL/L (ref 96–108)
CO2 SERPL-SCNC: 27 MMOL/L (ref 21–32)
CREAT SERPL-MCNC: 1.09 MG/DL (ref 0.6–1.3)
EOSINOPHIL # BLD AUTO: 0.21 THOUSAND/ÂΜL (ref 0–0.61)
EOSINOPHIL NFR BLD AUTO: 3 % (ref 0–6)
ERYTHROCYTE [DISTWIDTH] IN BLOOD BY AUTOMATED COUNT: 13 % (ref 11.6–15.1)
GFR SERPL CREATININE-BSD FRML MDRD: 70 ML/MIN/1.73SQ M
GLUCOSE SERPL-MCNC: 102 MG/DL (ref 65–140)
HCT VFR BLD AUTO: 41.7 % (ref 36.5–49.3)
HGB BLD-MCNC: 13.9 G/DL (ref 12–17)
IMM GRANULOCYTES # BLD AUTO: 0.02 THOUSAND/UL (ref 0–0.2)
IMM GRANULOCYTES NFR BLD AUTO: 0 % (ref 0–2)
LYMPHOCYTES # BLD AUTO: 2.33 THOUSANDS/ÂΜL (ref 0.6–4.47)
LYMPHOCYTES NFR BLD AUTO: 31 % (ref 14–44)
MCH RBC QN AUTO: 30.3 PG (ref 26.8–34.3)
MCHC RBC AUTO-ENTMCNC: 33.3 G/DL (ref 31.4–37.4)
MCV RBC AUTO: 91 FL (ref 82–98)
MONOCYTES # BLD AUTO: 0.7 THOUSAND/ÂΜL (ref 0.17–1.22)
MONOCYTES NFR BLD AUTO: 9 % (ref 4–12)
NEUTROPHILS # BLD AUTO: 4.23 THOUSANDS/ÂΜL (ref 1.85–7.62)
NEUTS SEG NFR BLD AUTO: 56 % (ref 43–75)
NRBC BLD AUTO-RTO: 0 /100 WBCS
PLATELET # BLD AUTO: 236 THOUSANDS/UL (ref 149–390)
PMV BLD AUTO: 11.5 FL (ref 8.9–12.7)
POTASSIUM SERPL-SCNC: 4.1 MMOL/L (ref 3.5–5.3)
PROCALCITONIN SERPL-MCNC: 0.06 NG/ML
RBC # BLD AUTO: 4.59 MILLION/UL (ref 3.88–5.62)
SODIUM SERPL-SCNC: 139 MMOL/L (ref 135–147)
WBC # BLD AUTO: 7.57 THOUSAND/UL (ref 4.31–10.16)

## 2024-06-25 PROCEDURE — 85025 COMPLETE CBC W/AUTO DIFF WBC: CPT | Performed by: FAMILY MEDICINE

## 2024-06-25 PROCEDURE — 99232 SBSQ HOSP IP/OBS MODERATE 35: CPT | Performed by: PHYSICIAN ASSISTANT

## 2024-06-25 PROCEDURE — 84145 PROCALCITONIN (PCT): CPT | Performed by: FAMILY MEDICINE

## 2024-06-25 PROCEDURE — 85730 THROMBOPLASTIN TIME PARTIAL: CPT | Performed by: FAMILY MEDICINE

## 2024-06-25 PROCEDURE — 80048 BASIC METABOLIC PNL TOTAL CA: CPT | Performed by: FAMILY MEDICINE

## 2024-06-25 PROCEDURE — 99239 HOSP IP/OBS DSCHRG MGMT >30: CPT | Performed by: FAMILY MEDICINE

## 2024-06-25 RX ORDER — RIVAROXABAN 15 MG-20MG
KIT ORAL
Qty: 1 EACH | Refills: 0 | Status: SHIPPED | OUTPATIENT
Start: 2024-06-25 | End: 2024-07-02 | Stop reason: ALTCHOICE

## 2024-06-25 RX ORDER — CEFDINIR 300 MG/1
300 CAPSULE ORAL EVERY 12 HOURS SCHEDULED
Qty: 8 CAPSULE | Refills: 0 | Status: SHIPPED | OUTPATIENT
Start: 2024-06-25 | End: 2024-06-29

## 2024-06-25 RX ADMIN — CEFTRIAXONE 1000 MG: 1 INJECTION, SOLUTION INTRAVENOUS at 06:07

## 2024-06-25 RX ADMIN — GABAPENTIN 300 MG: 300 CAPSULE ORAL at 08:51

## 2024-06-25 RX ADMIN — PANTOPRAZOLE SODIUM 40 MG: 40 TABLET, DELAYED RELEASE ORAL at 06:07

## 2024-06-25 RX ADMIN — RIVAROXABAN 15 MG: 15 TABLET, FILM COATED ORAL at 10:23

## 2024-06-25 RX ADMIN — AZITHROMYCIN DIHYDRATE 500 MG: 250 TABLET ORAL at 08:51

## 2024-06-25 NOTE — PROGRESS NOTES
"Progress Note - Pulmonary   Vini Ybarra 63 y.o. male MRN: [id number]  Unit/Bed#: 404-01 Encounter: [id number]    Assessment:    Acute bilateral PE without cor pulmonale   Left lower lobe pneumonia  Mediastinal/hilar lymphadenopathy  Trace left effusion  Chronic opioid use  Chronic right DVT      Pulmonary recommendations/plan:     Monitor SpO2- maintain greater than 90%. Seen on room air.   Continue heparin gtt for now, goal PTT 60-90 sec. Transitioned to Xarelto today.    This is his second VTE- recommend lifelong anticoagulation or at the very minimum 6 months AC. Can plan for risks vs benefits conversation as outpatient at a later date.   Continue CTX while inpatient- can transition to p.o. equivalent/cefdinir at discharge to complete 5-day course.  Discontinue azithromycin given low drip score  Trend WBC, procalcitonin, temps  No indication for thoracentesis at this time  Repeat CT chest w contrast in 2-3 months  Further management per primary team    Patient is stable from a pulmonary standpoint. Will sign off. Call with questions.     Chief Complaint:    \"I'm fine.\"    Subjective:    Patient was seen and examined today. No overnight events reported. Patient states the pain in his left chest/back is improving. Denies cough. Denies wheeze. No fevers or chills.     Objective:    Vitals: Blood pressure 124/73, pulse 64, temperature 98 °F (36.7 °C), resp. rate 18, height 5' 10\" (1.778 m), weight 94.3 kg (207 lb 14.3 oz), SpO2 93%.room air,Body mass index is 29.83 kg/m².      Intake/Output Summary (Last 24 hours) at 6/25/2024 1422  Last data filed at 6/25/2024 0854  Gross per 24 hour   Intake 1438.37 ml   Output --   Net 1438.37 ml       Invasive Devices       None                   Physical Exam:     Physical Exam  Vitals and nursing note reviewed.   Constitutional:       General: He is not in acute distress.     Appearance: Normal appearance.   HENT:      Head: Normocephalic and atraumatic.      Mouth/Throat:     " " Mouth: Mucous membranes are moist.      Pharynx: Oropharynx is clear.   Cardiovascular:      Rate and Rhythm: Normal rate and regular rhythm.      Heart sounds: No murmur heard.  Pulmonary:      Effort: Pulmonary effort is normal.      Breath sounds: No wheezing.      Comments: CTA bilaterally  Musculoskeletal:      Cervical back: Normal range of motion.      Right lower leg: No edema.      Left lower leg: No edema.   Skin:     General: Skin is warm and dry.   Neurological:      General: No focal deficit present.      Mental Status: He is alert. Mental status is at baseline.   Psychiatric:         Mood and Affect: Mood normal.         Behavior: Behavior normal.         Labs: I have personally reviewed pertinent lab results., ABG: No results found for: \"PHART\", \"DWG8LWI\", \"PO2ART\", \"AWW0AIR\", \"Z9UVFBAM\", \"BEART\", \"SOURCE\", BNP: No results found for: \"BNP\", CBC:   Lab Results   Component Value Date    WBC 7.57 06/25/2024    HGB 13.9 06/25/2024    HCT 41.7 06/25/2024    MCV 91 06/25/2024     06/25/2024    RBC 4.59 06/25/2024    MCH 30.3 06/25/2024    MCHC 33.3 06/25/2024    RDW 13.0 06/25/2024    MPV 11.5 06/25/2024    NRBC 0 06/25/2024   , CMP:   Lab Results   Component Value Date    SODIUM 139 06/25/2024    K 4.1 06/25/2024     06/25/2024    CO2 27 06/25/2024    BUN 14 06/25/2024    CREATININE 1.09 06/25/2024    CALCIUM 10.0 06/25/2024    EGFR 70 06/25/2024   , PT/INR: No results found for: \"PT\", \"INR\", Troponin: No results found for: \"TROPONINI\"    Imaging and other studies: I have personally reviewed pertinent reports.   and I have personally reviewed pertinent films in PACS    "

## 2024-06-25 NOTE — DISCHARGE SUMMARY
Discharge Summary - Vini Ybarra 63 y.o. male MRN: [id number]    Unit/Bed#: 404-01 Encounter: [id number]    Admission Date:   Admission Orders (From admission, onward)       Ordered        06/23/24 1119  INPATIENT ADMISSION  Once                            Admitting Diagnosis: Pleuritic chest pain [R07.81]  Chest pain [R07.9]  Multiple subsegmental pulmonary emboli without acute cor pulmonale (HCC) [I26.94]    HPI: Vini Ybarra is a 63 y.o. male with a PMH of opiate dependence, chronic neck pain status post cervical fusion, provoked DVT 10 years ago, GERD who presents with epigastric and pain at left side of torso on deep inspiration which woke him up from sleep in the middle of the night the day prior.  The patient states that prior to this he had been at his normal state of health.  The patient denies chills or fevers, palpitations, nausea or vomiting, bowel or urinary symptoms or lower extremity edema, erythema or pain.  He does note that he recently has been less active staying at home more.     Procedures Performed:   Orders Placed This Encounter   Procedures    Critical Care       Summary of Hospital Course:     Bilateral pulmonary embolism    63-year-old male with a history of chronic neck pain and history of PE presented to the emergency room with complaint of epigastric pain and shortness of breath.  Patient underwent a PE study that revealed bilateral pulmonary emboli.  He was initiated on IV heparin, underwent 2D echo, and seen in consultation by pulmonary medicine.  He was transitioned to Xarelto on discharge and will need lifelong anticoagulation given he has had a previous venous thromboembolism in the past.    Addendum 6/26/24 : pharmacy did not have xarelto, patient was switched to eliquis instructed to take morning dose on 6/25    Community-acquired pneumonia: Patient was febrile with evidence of pneumonia on CT, placed on ceftriaxone and will be transition to cefdinir on discharge to complete 5  days of antibiotics    Significant Findings, Care, Treatment and Services Provided:     CT    1. Small peripheral bilateral pulmonary arterial filling defects consistent with acute pulmonary emboli. Somewhat elevated RV/LV ratio at 1.03.   2. Left bibasilar atelectasis. More focal dependent left lower lobe opacification could also represent pneumonia. Small loculated left pleural effusion.   3. Mildly enlarged mediastinal and left hilar lymph nodes, likely reactive in nature.   4. No acute abnormality identified in the abdomen or pelvis.   5. Additional findings as noted.       I personally discussed this study with KRISTY CHAVIRA on 6/23/2024 10:22 AM.                   Workstation performed: [id number]       Complications: none    Discharge Diagnosis: see above    Medical Problems       Resolved Problems  Date Reviewed: 6/24/2024   None         Condition at Discharge: fair         Discharge instructions/Information to patient and family:   See after visit summary for information provided to patient and family.      Provisions for Follow-Up Care:  See after visit summary for information related to follow-up care and any pertinent home health orders.      PCP: Gui Espinosa,     Disposition: Home    Planned Readmission: No      Discharge Statement   I spent 40 minutes discharging the patient. This time was spent on the day of discharge. I had direct contact with the patient on the day of discharge. Additional documentation is required if more than 30 minutes were spent on discharge.     Discharge Medications:  See after visit summary for reconciled discharge medications provided to patient and family.

## 2024-06-25 NOTE — CASE MANAGEMENT
Case Management Discharge Planning Note    Patient name Vini Ybarra  Location /404-01 MRN [id number]  : 1960 Date 2024       Current Admission Date: 2024  Current Admission Diagnosis:Bilateral pulmonary embolism (HCC)   Patient Active Problem List    Diagnosis Date Noted Date Diagnosed    Left lower lobe pneumonia 2024     Bilateral pulmonary embolism (HCC) 2024     Continuous opioid dependence (HCC) 2024     Tick bite of right upper arm 2024     BPH without obstruction/lower urinary tract symptoms 2024     Eosinophilic esophagitis 2024     Gastroesophageal reflux disease 2023     Epigastric pain 2023     Esophageal dysphagia 10/20/2023     Chronic hand pain, right 2023     Arthritis 2023     Myelomalacia of cervical cord (HCC) 2023     Myelopathy (HCC) 2023       LOS (days): 2  Geometric Mean LOS (GMLOS) (days): 4  Days to GMLOS:2.1     OBJECTIVE:  Risk of Unplanned Readmission Score: 8.5         Current admission status: Inpatient   Preferred Pharmacy:   [id number] - [address] [address]  Phone: [phone] Fax: [phone]    Primary Care Provider: Gui Espinosa DO    Primary Insurance: [facility]  Secondary Insurance:     DISCHARGE DETAILS:    Discharge planning discussed with:: Patient        CM contacted family/caregiver?: No- see comments (patient managing health care)  Were Treatment Team discharge recommendations reviewed with patient/caregiver?: Yes  Did patient/caregiver verbalize understanding of patient care needs?: Yes  Were patient/caregiver advised of the risks associated with not following Treatment Team discharge recommendations?: Yes    Contacts  Patient Contacts: see face sheet    Requested Home Health Care         Is the patient interested in HHC at discharge?: No    DME Referral Provided  Referral made for DME?:  No         Would you like to participate in our [facility] service program?  : No - Declined    Treatment Team Recommendation: Home  Discharge Destination Plan:: Home  Transport at Discharge : Family            Patient stable for discharge home today.  Follow up providers listed on AVS.  Family to transport patient home.    Price check for Xarelto   at \[facility]\"" unable to reach.   CM provided patient with 10.00 / michele coupon.

## 2024-06-25 NOTE — TELEPHONE ENCOUNTER
----- Message from Ramona Crawford PA-C sent at 6/25/2024  2:24 PM EDT -----  Hello,     Please schedule Vini Ybarra for a HFU within the next 4 weeks after tentative discharge date of today    Preferred office location: [facility] to be scheduled with: ramona    Patient will need none    Patient discharge to Home        Note: If message sent over the weekend please call patient to confirm appointment time, Date, and location.

## 2024-06-25 NOTE — NURSING NOTE
Pt dc to home. Denies any questions or concerns. States all belongings with him at time of dc. Pt walked to main lobby with RN.

## 2024-06-26 ENCOUNTER — TELEPHONE (OUTPATIENT)
Age: 64
End: 2024-06-26

## 2024-06-26 ENCOUNTER — RA CDI HCC (OUTPATIENT)
Dept: OTHER | Facility: HOSPITAL | Age: 64
End: 2024-06-26

## 2024-06-26 LAB
APTT SCREEN TO CONFIRM RATIO: 1.12 RATIO (ref 0–1.34)
CONFIRM APTT/NORMAL: 40.7 SEC (ref 0–47.6)
DRVVT IMM 1:2 NP PPP: 42.3 SEC (ref 0–40.4)
DRVVT SCREEN TO CONFIRM RATIO: 1.2 RATIO (ref 0.8–1.2)
LA PPP-IMP: ABNORMAL
PROT S ACT/NOR PPP: 94 % (ref 61–136)
PROT S PPP-ACNC: 106 % (ref 60–150)
SCREEN APTT: 40 SEC (ref 0–43.5)
SCREEN DRVVT: 48.1 SEC (ref 0–47)
THROMBIN TIME: 14.3 SEC (ref 0–23)

## 2024-06-26 NOTE — TELEPHONE ENCOUNTER
Incoming call:    Patient asking for GENEVA Crawford to call back. Asked if I could help but he would like to speak to her regarding his recent hospitalization and case.  [phone]

## 2024-06-26 NOTE — TELEPHONE ENCOUNTER
Patient following up on miss call he just had . Patient will like to know it  Bizarre -PA was they one who called  Please advise

## 2024-06-26 NOTE — TELEPHONE ENCOUNTER
I called patient earlier today to discuss.  He reports recently starting vitamin D3/K2 3 weeks ago and expressed concerns that this was the causative agent for his blood clots.  I advised that I would look into this and get back to him.  While Vitamin K can be used as a reversal agent for increased coagulopathy it should not cause increased clotting risk in an other health individual. Furthermore, the dose he is on is well below the daily maximum dose recommended so I do not think that is the cause of his pulmonary emboli. I tried to call him back to review this but there was no answer and voice mail is not set up. If he calls back please let him know the above.

## 2024-06-26 NOTE — UTILIZATION REVIEW
NOTIFICATION OF ADMISSION DISCHARGE   This is a Notification of Discharge from [facility]. Please be advised that this patient has been discharge from our facility. Below you will find the admission and discharge date and time including the patient’s disposition.   UTILIZATION REVIEW CONTACT:  Ferny Pascal  Utilization   Network Utilization Review Department  Phone: [phone] x carefully listen to the prompts. All voicemails are confidential.  Email: [email or web address]     ADMISSION INFORMATION  PRESENTATION DATE: 6/23/2024  7:41 AM  OBERVATION ADMISSION DATE:   INPATIENT ADMISSION DATE: 6/23/24 11:19 AM   DISCHARGE DATE: 6/25/2024 11:08 AM   DISPOSITION:Home/Self Care    Network Utilization Review Department  ATTENTION: Please call with any questions or concerns to [phone] and carefully listen to the prompts so that you are directed to the right person. All voicemails are confidential.   For Discharge needs, contact Care Management DC Support Team at [phone] opt. 2  Send all requests for admission clinical reviews, approved or denied determinations and any other requests to dedicated fax number below belonging to the campus where the patient is receiving treatment. List of dedicated fax numbers for the Facilities:  FACILITY NAME UR FAX NUMBER   ADMISSION DENIALS (Administrative/Medical Necessity) [phone]   DISCHARGE SUPPORT TEAM ([facility]) [phone]   PARENT CHILD HEALTH (Maternity/NICU/Pediatrics) [phone]   [facility] [phone]   [facility] [phone]   [facility] [phone]   [facility] [phone]   [facility] [phone]   [facility] [phone]   [facility] [phone]   [facility] [phone]   [facility]  [facility] [phone]   [facility] [phone]   General acute hospital [phone]   [facility] [phone]

## 2024-07-02 ENCOUNTER — OFFICE VISIT (OUTPATIENT)
Dept: FAMILY MEDICINE CLINIC | Facility: CLINIC | Age: 64
End: 2024-07-02
Payer: COMMERCIAL

## 2024-07-02 VITALS
HEIGHT: 70 IN | HEART RATE: 75 BPM | OXYGEN SATURATION: 96 % | WEIGHT: 209 LBS | DIASTOLIC BLOOD PRESSURE: 66 MMHG | SYSTOLIC BLOOD PRESSURE: 114 MMHG | BODY MASS INDEX: 29.92 KG/M2 | TEMPERATURE: 97.5 F

## 2024-07-02 DIAGNOSIS — I26.99 BILATERAL PULMONARY EMBOLISM (HCC): Primary | ICD-10-CM

## 2024-07-02 DIAGNOSIS — R76.0 LUPUS ANTICOAGULANT POSITIVE: ICD-10-CM

## 2024-07-02 DIAGNOSIS — G95.89 MYELOMALACIA OF CERVICAL CORD (HCC): ICD-10-CM

## 2024-07-02 DIAGNOSIS — N40.0 BPH WITHOUT OBSTRUCTION/LOWER URINARY TRACT SYMPTOMS: ICD-10-CM

## 2024-07-02 DIAGNOSIS — M19.90 ARTHRITIS: ICD-10-CM

## 2024-07-02 PROCEDURE — 99495 TRANSJ CARE MGMT MOD F2F 14D: CPT | Performed by: INTERNAL MEDICINE

## 2024-07-02 NOTE — ASSESSMENT & PLAN NOTE
"Couple subsegmental pulmonary emboli in the face of a positive lupus anticoagulant.  Remains on Eliquis for now.  Is follow-up with pulmonary.  Consider follow-up with hematology to get an opinion on long-term anticoagulation given this is his second thrombotic event, but first \"unprovoked\" event.  "

## 2024-07-02 NOTE — PROGRESS NOTES
"Transition of Care Visit  Name: Vini Ybarra      : 1960      MRN: [id number]  Encounter Provider: Gui Espinosa DO  Encounter Date: 2024   Encounter department: [facility]    Assessment & Plan   1. Bilateral pulmonary embolism (HCC)  Assessment & Plan:  Couple subsegmental pulmonary emboli in the face of a positive lupus anticoagulant.  Remains on Eliquis for now.  Is follow-up with pulmonary.  Consider follow-up with hematology to get an opinion on long-term anticoagulation given this is his second thrombotic event, but first \"unprovoked\" event.  Orders:  -     apixaban (ELIQUIS) 5 mg; Take 1 tablet (5 mg total) by mouth 2 (two) times a day  2. Lupus anticoagulant positive  Assessment & Plan:  Positive lupus anticoagulant on hyper coag profile.  Consider follow-up with hematology as discussed.  Certainly will repeat after several months.  He is to stay on Eliquis and likely 6 months at minimum.  3. Arthritis  Assessment & Plan:  He is aware now that he is on Eliquis he can no longer take anti-inflammatories.  Has oxycodone for severe pain.  4. Myelomalacia of cervical cord (HCC)  Assessment & Plan:  Chronic neck pain associated with this.  Prior history of herniated disc.  5. BPH without obstruction/lower urinary tract symptoms  Assessment & Plan:  Status post TUR, with great improvement of symptomatology.      Depression Screening and Follow-up Plan: Patient was screened for depression during today's encounter. They screened negative with a PHQ-2 score of 0.        History of Present Illness     Transitional Care Management Review:   Vini Ybarra is a 63 y.o. male here for TCM follow up.     During the TCM phone call patient stated:  TCM Call       Date and time call was made  2024 11:42 AM    Hospital care reviewed  Records reviewed    Patient was hospitialized at  [facility]    Date of Admission  24    Date of discharge  24    Diagnosis  " Bilateral pulmonary embolism    Disposition  Home    Current Symptoms  None          TCM Call       Should patient be enrolled in anticoag monitoring?  No    Scheduled for follow up?  Yes    Did you obtain your prescribed medications  Yes    Do you need help managing your prescriptions or medications  No    Is transportation to your appointment needed  No    Living Arrangements  Family members    Are you recieving any outpatient services  No    Are you recieving home care services  No    Are you using any community resources  No    Current waiver services  No    Have you fallen in the last 12 months  No    Interperter language line needed  No    Counseling  Patient          Vini is here for follow-up.  Recently admitted for subsegmental pulmonary emboli.  He is doing quite well.  Now on Eliquis.  His hypercoag profile did reveal an elevated lupus anticoagulant.  He notes that he was taking vitamin D and a vitamin K2 supplement of recent.  Has not been as active as he usually is.  Trying to find reasons why he will not have to be on long-term anticoagulation.  He is not sure why I got this blood clot.  He does have a history of DVT in the past following surgery on his neck.  Treated appropriately with anticoagulation as well.  Currently his pleuritic pain is improved which took him to the ER.  He is breathing easy, and does not feel short of breath.  Treated with antibiotics due to the appearance of the CAT scan suggestive of possible pneumonia, but he had no fever or other symptoms.      Review of Systems   Constitutional:  Negative for chills and fever.   HENT:  Negative for rhinorrhea and sore throat.    Eyes:  Negative for visual disturbance.   Respiratory:  Negative for cough and shortness of breath.    Cardiovascular:  Negative for chest pain and leg swelling.   Gastrointestinal:  Negative for abdominal pain, diarrhea, nausea and vomiting.   Genitourinary:  Negative for dysuria.   Musculoskeletal:  Positive for  "arthralgias, back pain and neck pain. Negative for myalgias.   Skin:  Negative for rash.   Neurological:  Negative for dizziness and headaches.   Psychiatric/Behavioral:  Negative for confusion.    All other systems reviewed and are negative.    Objective     /66 (BP Location: Left arm, Patient Position: Sitting, Cuff Size: Large)   Pulse 75   Temp 97.5 °F (36.4 °C) (Tympanic)   Ht 5' 10\" (1.778 m)   Wt 94.8 kg (209 lb)   SpO2 96%   BMI 29.99 kg/m²     Physical Exam  Vitals and nursing note reviewed.   Constitutional:       General: He is not in acute distress.     Appearance: Normal appearance. He is well-developed.   HENT:      Head: Normocephalic and atraumatic.   Eyes:      Conjunctiva/sclera: Conjunctivae normal.   Cardiovascular:      Rate and Rhythm: Normal rate and regular rhythm.      Pulses: Normal pulses.      Heart sounds: Normal heart sounds. No murmur heard.  Pulmonary:      Effort: Pulmonary effort is normal. No respiratory distress.      Breath sounds: Normal breath sounds.   Abdominal:      Palpations: Abdomen is soft.      Tenderness: There is no abdominal tenderness.   Musculoskeletal:         General: No swelling. Normal range of motion.      Cervical back: Normal range of motion and neck supple.   Skin:     General: Skin is warm and dry.      Capillary Refill: Capillary refill takes less than 2 seconds.   Neurological:      General: No focal deficit present.      Mental Status: He is alert and oriented to person, place, and time.   Psychiatric:         Mood and Affect: Mood normal.       Medications have been reviewed by provider in current encounter    Administrative Statements         "

## 2024-07-02 NOTE — ASSESSMENT & PLAN NOTE
Positive lupus anticoagulant on hyper coag profile.  Consider follow-up with hematology as discussed.  Certainly will repeat after several months.  He is to stay on Eliquis and likely 6 months at minimum.

## 2024-07-02 NOTE — ASSESSMENT & PLAN NOTE
He is aware now that he is on Eliquis he can no longer take anti-inflammatories.  Has oxycodone for severe pain.

## 2024-07-11 DIAGNOSIS — I26.99 BILATERAL PULMONARY EMBOLISM (HCC): ICD-10-CM

## 2024-07-17 ENCOUNTER — OFFICE VISIT (OUTPATIENT)
Age: 64
End: 2024-07-17
Payer: COMMERCIAL

## 2024-07-17 VITALS
DIASTOLIC BLOOD PRESSURE: 64 MMHG | TEMPERATURE: 98.3 F | SYSTOLIC BLOOD PRESSURE: 110 MMHG | WEIGHT: 209 LBS | RESPIRATION RATE: 18 BRPM | BODY MASS INDEX: 29.92 KG/M2 | HEART RATE: 66 BPM | OXYGEN SATURATION: 97 % | HEIGHT: 70 IN

## 2024-07-17 DIAGNOSIS — K21.9 GASTROESOPHAGEAL REFLUX DISEASE, UNSPECIFIED WHETHER ESOPHAGITIS PRESENT: ICD-10-CM

## 2024-07-17 DIAGNOSIS — R10.13 EPIGASTRIC PAIN: Primary | ICD-10-CM

## 2024-07-17 DIAGNOSIS — Z12.11 SCREENING FOR COLON CANCER: ICD-10-CM

## 2024-07-17 DIAGNOSIS — R13.19 ESOPHAGEAL DYSPHAGIA: ICD-10-CM

## 2024-07-17 PROBLEM — K20.0 EOSINOPHILIC ESOPHAGITIS: Status: RESOLVED | Noted: 2024-01-31 | Resolved: 2024-07-17

## 2024-07-17 PROCEDURE — 99214 OFFICE O/P EST MOD 30 MIN: CPT | Performed by: NURSE PRACTITIONER

## 2024-07-17 RX ORDER — KETOCONAZOLE 20 MG/G
CREAM TOPICAL
COMMUNITY
Start: 2024-06-04

## 2024-07-17 RX ORDER — NORTRIPTYLINE HYDROCHLORIDE 10 MG/1
CAPSULE ORAL
COMMUNITY
Start: 2024-05-11

## 2024-07-17 RX ORDER — OMEPRAZOLE 40 MG/1
40 CAPSULE, DELAYED RELEASE ORAL DAILY
Qty: 30 CAPSULE | Refills: 5 | Status: SHIPPED | OUTPATIENT
Start: 2024-07-17 | End: 2024-07-17 | Stop reason: SDUPTHER

## 2024-07-17 RX ORDER — OMEPRAZOLE 40 MG/1
40 CAPSULE, DELAYED RELEASE ORAL DAILY
Qty: 90 CAPSULE | Refills: 1 | Status: SHIPPED | OUTPATIENT
Start: 2024-07-17

## 2024-07-17 NOTE — PATIENT INSTRUCTIONS
Continue Omeprazole 40 mg daily as prescribed.   Continue to avoid trigger foods as much as possible.   Continue to drink at least 64 oz of water daily.   Continue to watch for red flag symptoms.   Schedule a f/u OV in 6 months.     We did review GI red flag symptoms, including, but not limited to: chronic nausea, vomiting, diarrhea, chills, fever, and unintentional weight loss and should call or contact our office with any changes or concerns. I reviewed with the patient that if they notice any blood while vomiting or in their stool they should contact or office or go to the nearest emergency room for immediate evaluation. The patient was agreeable and verbalized an understanding.

## 2024-07-17 NOTE — PROGRESS NOTES
[facility] - Outpatient Follow-up Note  Vini Ybarra 63 y.o. male MRN: [id number]  Encounter: [id number]          ASSESSMENT AND PLAN:    The patient presents today  for follow-up office visit regarding his chronic epigastric pain, dysphagia, and GERD symptoms.    Exam: Abdomen is soft, nondistended, nontender, with hypoactive bowel sounds x 4. No edema noted of the b/l lower extremities upon exam today. Skin is non-icteric.      1. Epigastric pain  -     omeprazole (PriLOSEC) 40 MG capsule; Take 1 capsule (40 mg total) by mouth daily  2. Esophageal dysphagia  -     omeprazole (PriLOSEC) 40 MG capsule; Take 1 capsule (40 mg total) by mouth daily  3. Gastroesophageal reflux disease, unspecified whether esophagitis present  Assessment & Plan:  Improved/Stable  Continue omeprazole 40 mg daily as prescribed for the next 6 months and at next office visit we will discuss de-escalation of the omeprazole if possible.  Continue to avoid trigger foods is much as possible.  Continue to try to drink at least 64 ounces of water daily.  Orders:  -     omeprazole (PriLOSEC) 40 MG capsule; Take 1 capsule (40 mg total) by mouth daily  4. Screening for colon cancer  We will hold off on any repeat colonoscopies until the recommended surveillance date unless the patient would start to develop red flag symptoms in the future.  The patient was agreeable and verbalized an understanding.  DUE: 2026    Reviewed GI red flag symptoms with patient today.      The patient will schedule a follow up office visit in 6 months, but understands to call or contact our office if there are any issues or concerns in the mean time.  ______________________________________________________________________    SUBJECTIVE: Patient is a 63 y.o. male who was previously seen in our office on 1/31/24 and presents today for follow-up office visit regarding his chronic epigastric pain, dysphagia, and GERD symptoms.  At his  last office visit the patient did proceed with the EGD as previously discussed on May 1, 2024 with Dr. Villa which did show evidence of gastritis and possible erosive esophagitis with the pathology coming back normal and negative for EOE.    The patient reports that since his last office visit with the continued use of the omeprazole there is almost complete resolution and definite significant overall improvement in his epigastric pain, dysphagia, and GERD symptoms without any issues or side effects.    The patient denies any reflux, nausea, dysphagia, vomiting, decreased appetite, unplanned weight loss, or abdominal pain. Water Intake: 5 bottles per day.    The patient reports that they have a BM daily and reports that it is relieving, without any consistent diarrhea, nocturnal BMs, constipation, straining, melena or bloody stools. Last BM: Yesterday. Flatus: Yes, normal for him.    Meds: Omeprazole 40 mg daily.   Daily NSAID Use: Denied.    Imaging: (6/23/24): CT of the chest abdomen pelvis with contrast: Normal.    Endoscopy History: EGD: (5/1/24): Evidence of gastritis and possible erosive esophagitis. Path: Normal.    COLONOSCOPY: (1-2 years ago): Dr Sharma or Dr Bruno. Normal according to the patient and is to have repeat in 5 years.       DUE: 2026    REVIEW OF SYSTEMS IS OTHERWISE NEGATIVE.      Historical Information   Past Medical History:   Diagnosis Date    Arthritis     Benign prostatic hyperplasia with incomplete bladder emptying 02/28/2024    Chronic pain disorder     COVID 2021    GERD (gastroesophageal reflux disease)     Spinal cord dysplasia (HCC)      Past Surgical History:   Procedure Laterality Date    CERVICAL FUSION      EGD      HERNIA REPAIR Left     NASAL SEPTOPLASTY W/ TURBINOPLASTY  08/22/2003    NE BX PROSTATE STRTCTC SATURATION SAMPLING IMG GID N/A 11/22/2023    Procedure: TRANSPERINEAL ULTRASOUND GUIDED BIOPSY PROSTATE;  Surgeon: Luis Antonio Rodrigues MD;  Location: [facility];  Service:  Urology    FL TRURL ELECTROSURG RESCJ PROSTATE BLEED COMPLETE N/A 2/28/2024    Procedure: TRANSURETHRAL RESECTION OF PROSTATE (TURP);  Surgeon: Luis Antonio Rodrigues MD;  Location: [facility];  Service: Urology    TOOTH EXTRACTION       Social History   Social History     Substance and Sexual Activity   Alcohol Use Yes    Comment: couple beers a week     Social History     Substance and Sexual Activity   Drug Use Not Currently     Social History     Tobacco Use   Smoking Status Former    Average packs/day: 0.5 packs/day for 40.0 years (20.0 ttl pk-yrs)    Types: Cigarettes    Start date: 1974    Passive exposure: Past   Smokeless Tobacco Never     Family History   Problem Relation Age of Onset    Cancer Father     Lung cancer Brother     Cancer Son        Meds/Allergies       Current Outpatient Medications:     apixaban (ELIQUIS) 5 mg    cholecalciferol (VITAMIN D3) 1,000 units tablet    gabapentin (NEURONTIN) 300 mg capsule    omeprazole (PriLOSEC) 40 MG capsule    oxyCODONE (OXY-IR) 5 MG capsule    rosuvastatin (CRESTOR) 5 mg tablet    Zinc 50 MG CAPS    Allergies   Allergen Reactions    Acetaminophen Other (See Comments)     Other reaction(s): TONGUE LIPS NOSE TINGLING SENSAT             Objective     There were no vitals taken for this visit. There is no height or weight on file to calculate BMI.      PHYSICAL EXAM:      General Appearance:   Alert, cooperative, no distress   HEENT:   Normocephalic, atraumatic, anicteric.     Neck:  Supple, symmetrical, trachea midline   Lungs:   Clear to auscultation bilaterally; no rales, rhonchi or wheezing; respirations unlabored    Heart::   Regular rate and rhythm; no murmur, rub, or gallop.   Abdomen:   Soft, non-tender, non-distended; normal bowel sounds; no masses, no organomegaly    Genitalia:   Deferred    Rectal:   Deferred    Extremities:  No cyanosis, clubbing or edema    Pulses:  2+ and symmetric    Skin:  No jaundice, rashes, or lesions    Lymph nodes:  No palpable  cervical lymphadenopathy        Lab Results:   No visits with results within 1 Day(s) from this visit.   Latest known visit with results is:   No results displayed because visit has over 200 results.            Radiology Results:    VAS VENOUS DUPLEX - LOWER LIMB BILATERAL    Result Date: 6/24/2024  Narrative:  [facility] REPORT CLINICAL: Indications: Patient presents with recent discovery of pulmonary embolism and physician wants to determine potential source.  Patient denies leg symptoms. Risk Factors The patient has history of DVT.  FINDINGS:  Right          Impression                           Reflux  Peroneal       E1.Non Occlusive Thrombus (Chronic)  Reflux   Left           Impression                        GSV Mid Calf   E3.Occlusive Segmental (Chronic)  GSV Dist Calf  E3.Occlusive Segmental (Chronic)     CONCLUSION: Impression: RIGHT LOWER LIMB: No evidence of acute deep vein thrombosis. Evidence of chronic non-occlusive deep vein thrombosis noted in one of the paired peroneal veins. No evidence of superficial thrombophlebitis noted. Venous reflux noted in one of the paired peroneal veins, suggestive of deep venous insufficiency. Popliteal, posterior tibial and anterior tibial arterial Doppler waveforms are triphasic.  LEFT LOWER LIMB: No evidence of acute or chronic deep vein thrombosis. Evidence of subacute vs chronic superficial thrombophlebitis noted in the mid and distal calf greater saphenous vein. Doppler evaluation shows a normal response to augmentation maneuvers. Popliteal, posterior tibial and anterior tibial arterial Doppler waveforms are triphasic.  SIGNATURE: Electronically Signed by: HECTOR ADAIR MD on 2024-06-24 03:19:02 PM    Echo complete w/ contrast if indicated    Result Date: 6/24/2024  Narrative:   Left Ventricle: Left ventricular cavity size is normal. Wall thickness is normal. The left ventricular ejection fraction is 55%. Systolic function is normal. Wall motion is normal.  Diastolic function is normal.   Left Atrium: The atrium is mildly dilated.   Aortic Valve: There is aortic valve sclerosis.   Mitral Valve: There is mild regurgitation.   Tricuspid Valve: There is mild regurgitation.   Pulmonary Artery: The estimated pulmonary artery systolic pressure is 28.0 mmHg.     PE Study with CT abdomen & pelvis with contrast    Result Date: 6/23/2024  Narrative: CT PULMONARY ANGIOGRAM OF THE CHEST AND CT ABDOMEN AND PELVIS WITH INTRAVENOUS CONTRAST INDICATION: L lateral chest pain, x 2 days, constant, pleuritic, elevated D-dimer, r/o PE, LLL lung pathology or LUQ abdominal pathology.. COMPARISON: CT chest 4/22/2019 TECHNIQUE: CT examination of the chest, abdomen and pelvis was performed. Thin section CT angiographic technique was used in the chest in order to evaluate for pulmonary embolus and coronal 3D MIP postprocessing was performed on the acquisition scanner. Multiplanar 2D reformatted images were created from the source data. This examination, like all CT scans performed in the [facility], was performed utilizing techniques to minimize radiation dose exposure, including the use of iterative reconstruction and automated exposure control. Radiation dose length product (DLP) for this visit: 996.89 mGy-cm IV Contrast: 100 mL of iohexol (OMNIPAQUE) Enteric Contrast: Not administered. FINDINGS: CHEST PULMONARY ARTERIAL TREE: There is adequate opacification of the pulmonary arterial vasculature. Small peripheral bilateral pulmonary artery filling defects are identified including right lower lobe (603/73), left lower lobe (603/71) and inferior lingula (603/53), consistent with acute pulmonary emboli.. RV diameter = 5.6 cm LV diameter = 5.4 cm Calculated RV/LV ratio = 1.03 LUNGS: Small superior segment left lower lobe calcified granuloma noted. Dependent atelectasis at the lung bases is present, likely reflecting atelectasis. More focal ill-defined opacification left lower lobe  could reflect pneumonia.. Central airways are  patent. PLEURA: Trace loculated left basilar pleural effusion. HEART/AORTA: The heart is mildly enlarged. Coronary artery calcification present.. No thoracic aortic aneurysm. MEDIASTINUM AND GARRICK: Mildly prominent mediastinal and left hilar lymph nodes noted. Mediastinal lymph nodes in the paratracheal, aorticopulmonary window, prevascular, and subcarinal locations noted measuring up to 12 mm short axis with inferior left hilar lymph node measuring up to 12 mm short axis as well. CHEST WALL AND LOWER NECK: Unremarkable. ABDOMEN LIVER/BILIARY TREE: Diffuse hepatic steatosis. GALLBLADDER: No calcified gallstones. No pericholecystic inflammatory change. SPLEEN: Unremarkable. PANCREAS: Unremarkable. ADRENAL GLANDS: Unremarkable. 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 Simple appearing right renal cysts STOMACH AND BOWEL: No bowel wall thickening or intestinal obstruction noted. Mild retained fecal material in the colon. APPENDIX: Normal appendix. ABDOMINOPELVIC CAVITY: No ascites. No pneumoperitoneum. No lymphadenopathy. VESSELS: Unremarkable for patient's age. PELVIS REPRODUCTIVE ORGANS: Prostatic TURP defect. URINARY BLADDER: Mild circumferential bladder wall thickening. ABDOMINAL WALL/INGUINAL REGIONS: Small fat-containing umbilical hernia. BONES: No acute fracture or suspicious osseous lesion.     Impression: 1. Small peripheral bilateral pulmonary arterial filling defects consistent with acute pulmonary emboli. Somewhat elevated RV/LV ratio at 1.03. 2. Left bibasilar atelectasis. More focal dependent left lower lobe opacification could also represent pneumonia. Small loculated left pleural effusion. 3. Mildly enlarged mediastinal and left hilar lymph nodes, likely reactive in nature. 4. No acute abnormality identified in the abdomen or pelvis. 5. Additional findings as noted. I personally discussed this study  with KRISTY CHAVIRA on 6/23/2024 10:22 AM. Workstation performed: [id number]     XR chest 2 views    Result Date: 6/23/2024  Narrative: XR CHEST PA & LATERAL DUAL ENERGY SUBTRACTION. INDICATION:  cp. Left chest and sternal pain, worsens with deep breath. COMPARISON: CXR 1/18/2022 and 7/14/2020, chest CT 4/22/2019. FINDINGS: Low lung volumes with bibasilar opacity. No pneumothorax or pleural effusion. Unremarkable cardiomediastinal silhouette. Bones are unremarkable for age. Unremarkable upper abdomen.     Impression: Low lung volumes with bibasilar opacity which could be due to atelectasis. Pneumonia/aspiration not excluded in the appropriate clinical setting. Workstation performed: [id number]

## 2024-07-17 NOTE — ASSESSMENT & PLAN NOTE
Improved/Stable  Continue omeprazole 40 mg daily as prescribed for the next 6 months and at next office visit we will discuss de-escalation of the omeprazole if possible.  Continue to avoid trigger foods is much as possible.  Continue to try to drink at least 64 ounces of water daily.

## 2024-07-24 PROBLEM — J18.9 LEFT LOWER LOBE PNEUMONIA: Status: RESOLVED | Noted: 2024-06-24 | Resolved: 2024-07-24

## 2024-07-25 ENCOUNTER — OFFICE VISIT (OUTPATIENT)
Dept: PULMONOLOGY | Facility: CLINIC | Age: 64
End: 2024-07-25
Payer: COMMERCIAL

## 2024-07-25 VITALS
WEIGHT: 205 LBS | HEIGHT: 70 IN | HEART RATE: 73 BPM | OXYGEN SATURATION: 96 % | BODY MASS INDEX: 29.35 KG/M2 | SYSTOLIC BLOOD PRESSURE: 124 MMHG | TEMPERATURE: 98.6 F | DIASTOLIC BLOOD PRESSURE: 78 MMHG

## 2024-07-25 DIAGNOSIS — J18.9 PNEUMONIA OF LEFT LOWER LOBE DUE TO INFECTIOUS ORGANISM: ICD-10-CM

## 2024-07-25 DIAGNOSIS — J90 PLEURAL EFFUSION: ICD-10-CM

## 2024-07-25 DIAGNOSIS — I26.99 BILATERAL PULMONARY EMBOLISM (HCC): Primary | ICD-10-CM

## 2024-07-25 PROCEDURE — 99214 OFFICE O/P EST MOD 30 MIN: CPT | Performed by: PHYSICIAN ASSISTANT

## 2024-07-25 NOTE — ASSESSMENT & PLAN NOTE
Continue Eliquis 5 mg p.o. twice daily for minimum 6 months.  Noted positive lupus anticoagulant on admission. Would repeat after 6 months of AC after which it is likely okay to hold Eliquis for 4 days to repeat testing. Will discuss further at next follow-up. Vini has had 2 separate VTEs and indefinite AC lifelong is recommended.

## 2024-07-25 NOTE — PROGRESS NOTES
Pulmonary Follow Up Note   Vini Ybarra 64 y.o. male MRN: [id number]  7/25/2024      Assessment:    Bilateral pulmonary embolism (HCC)  Continue Eliquis 5 mg p.o. twice daily for minimum 6 months.  Noted positive lupus anticoagulant on admission. Would repeat after 6 months of AC after which it is likely okay to hold Eliquis for 4 days to repeat testing. Will discuss further at next follow-up. Vini has had 2 separate VTEs and indefinite AC lifelong is recommended.     Pneumonia of left lower lobe due to infectious organism  Completed antibiotics.  No indication for repeat antibiotics at this time.  Check CT chest without contrast end of August to follow-up and ensure resolution.    Pleural effusion  As above.  Repeat CT chest end of August to ensure resolution.      Plan:    Diagnoses and all orders for this visit:    Bilateral pulmonary embolism (HCC)    Pleural effusion  -     CT chest wo contrast; Future    Pneumonia of left lower lobe due to infectious organism  -     CT chest wo contrast; Future        No follow-ups on file.    History of Present Illness   HPI:  Vini Ybarra is a 64 y.o. male who presents the office today for hospital follow-up.  Patient was hospitalized 6/23-6/25 for acute pulmonary emboli.  He has past medical is positive for GERD, osteoarthritis, chronic opioid dependence, myelomalacia of the cervical cord status post cervical fusion 10 years ago, remote history of DVT 10 years ago.  Prior to his hospital stay patient reported feelings of malaise that he attributed to heat/humidity followed by pain in his left chest that reached around to his back/flank area.  Pain was worse with deep inspiration and movement.  Upon arrival in the ED had CTA chest PE study that showed bilateral PE, left lower lobe infiltrate, loculated left pleural effusion and mediastinal/hilar lymphadenopathy.  He was admitted for PE and pneumonia.  Was treated with IV heparin and then transition to Eliquis.  Also treated  with ceftriaxone then transition to cefdinir at discharge.  Azithromycin was started but not continued given low drip score.  CT imaging also showed small loculated pleural effusion but thoracentesis was not pursued.  Patient had lower extremity edema Dopplers that did show chronic DVT in the right lower extremity.  Presently, patient reports that pain in his left chest is completely resolved.  Denies shortness of breath.  He does endorse occasional cough like clearing his throat.  Denies wheezing.  Denies fevers, chills, sick contacts.  Denies issues with Eliquis.        Review of Systems   All other systems reviewed and are negative.      Historical Information   Past Medical History:   Diagnosis Date    Arthritis     Benign prostatic hyperplasia with incomplete bladder emptying 02/28/2024    Chronic pain disorder     COVID 2021    GERD (gastroesophageal reflux disease)     Spinal cord dysplasia (HCC)      Past Surgical History:   Procedure Laterality Date    CERVICAL FUSION      EGD      HERNIA REPAIR Left     NASAL SEPTOPLASTY W/ TURBINOPLASTY  08/22/2003    NM BX PROSTATE STRTCTC SATURATION SAMPLING IMG GID N/A 11/22/2023    Procedure: TRANSPERINEAL ULTRASOUND GUIDED BIOPSY PROSTATE;  Surgeon: Luis Antonio Rodrigues MD;  Location: [facility];  Service: Urology    NM TRURL ELECTROSURG RESCJ PROSTATE BLEED COMPLETE N/A 2/28/2024    Procedure: TRANSURETHRAL RESECTION OF PROSTATE (TURP);  Surgeon: Luis Antonio Rodrigues MD;  Location: [facility];  Service: Urology    TOOTH EXTRACTION       Family History   Problem Relation Age of Onset    Cancer Father     Lung cancer Brother     Cancer Son        Social History     Tobacco Use   Smoking Status Former    Average packs/day: 0.5 packs/day for 40.0 years (20.0 ttl pk-yrs)    Types: Cigarettes    Start date: 1974    Passive exposure: Past   Smokeless Tobacco Never         Meds/Allergies     Current Outpatient Medications:     apixaban (ELIQUIS) 5 mg, Take 1 tablet (5 mg total) by mouth 2  "(two) times a day, Disp: 60 tablet, Rfl: 5    cholecalciferol (VITAMIN D3) 1,000 units tablet, Take 1,000 Units by mouth daily, Disp: , Rfl:     gabapentin (NEURONTIN) 300 mg capsule, Take 300 mg by mouth 3 (three) times a day as needed, Disp: , Rfl:     ketoconazole (NIZORAL) 2 % cream, apply to affected area twice a day ON FACE TILL CLEAR FOR FLARES, Disp: , Rfl:     nortriptyline (PAMELOR) 10 mg capsule, take 1 capsule by mouth at bedtime for 4 weeks then INCREASE to 2...  (REFER TO PRESCRIPTION NOTES)., Disp: , Rfl:     omeprazole (PriLOSEC) 40 MG capsule, Take 1 capsule (40 mg total) by mouth daily, Disp: 90 capsule, Rfl: 1    oxyCODONE (OXY-IR) 5 MG capsule, Take 1 capsule (5 mg total) by mouth 2 (two) times a day as needed for moderate pain Max Daily Amount: 10 mg, Disp: 60 capsule, Rfl: 0    rosuvastatin (CRESTOR) 5 mg tablet, Take 1 tablet (5 mg total) by mouth daily, Disp: 90 tablet, Rfl: 3    Zinc 50 MG CAPS, Take by mouth, Disp: , Rfl:   Allergies   Allergen Reactions    Acetaminophen Other (See Comments)     Other reaction(s): TONGUE LIPS NOSE TINGLING SENSAT         Vitals: Blood pressure 124/78, pulse 73, temperature 98.6 °F (37 °C), temperature source Temporal, height 5' 10\" (1.778 m), weight 93 kg (205 lb), SpO2 96%. Body mass index is 29.41 kg/m². Oxygen Therapy  SpO2: 96 %    Physical Exam  Physical Exam  Vitals reviewed.   Constitutional:       Appearance: Normal appearance. He is well-developed.   HENT:      Head: Normocephalic and atraumatic.      Nose: Nose normal.      Mouth/Throat:      Mouth: Mucous membranes are moist.   Eyes:      Extraocular Movements: Extraocular movements intact.   Cardiovascular:      Rate and Rhythm: Normal rate and regular rhythm.      Heart sounds: Normal heart sounds.   Pulmonary:      Effort: Pulmonary effort is normal. No respiratory distress.      Breath sounds: No wheezing, rhonchi or rales.   Musculoskeletal:         General: No swelling.   Skin:     General: " "Skin is warm and dry.   Neurological:      Mental Status: He is alert. Mental status is at baseline.   Psychiatric:         Mood and Affect: Mood normal.         Behavior: Behavior normal.         Labs: I have personally reviewed pertinent lab results., ABG: No results found for: \"PHART\", \"GED4LQP\", \"PO2ART\", \"INL4MMH\", \"C3VMIPUR\", \"BEART\", \"SOURCE\", BNP: No results found for: \"BNP\", CBC: No results found for: \"WBC\", \"HGB\", \"HCT\", \"MCV\", \"PLT\", \"ADJUSTEDWBC\", \"RBC\", \"MCH\", \"MCHC\", \"RDW\", \"MPV\", \"NRBC\", CMP: No results found for: \"SODIUM\", \"K\", \"CL\", \"CO2\", \"ANIONGAP\", \"BUN\", \"CREATININE\", \"GLUCOSE\", \"CALCIUM\", \"AST\", \"ALT\", \"ALKPHOS\", \"PROT\", \"BILITOT\", \"EGFR\", PT/INR: No results found for: \"PT\", \"INR\", Troponin: No results found for: \"TROPONINI\"  Lab Results   Component Value Date    WBC 7.57 06/25/2024    HGB 13.9 06/25/2024    HCT 41.7 06/25/2024    MCV 91 06/25/2024     06/25/2024     Lab Results   Component Value Date    GLUCOSE 89 06/07/2014    CALCIUM 10.0 06/25/2024     06/07/2014    K 4.1 06/25/2024    CO2 27 06/25/2024     06/25/2024    BUN 14 06/25/2024    CREATININE 1.09 06/25/2024     No results found for: \"IGE\"  Lab Results   Component Value Date    ALT 19 06/24/2024    AST 16 06/24/2024    ALKPHOS 63 06/24/2024    BILITOT 0.3 06/07/2014       Imaging and other studies: I have personally reviewed pertinent reports.   and I have personally reviewed pertinent films in PACS     CTA chest PE study with CT abdomen pelvis with contrast 6/23/2024  Adequate opacification of the pulmonary artery vasculature.  Small peripheral bilateral pulmonary artery filling defects are identified in the right lower lobe, left lower lobe, inferior lingula all consistent with acute pulmonary emboli.  RV LV ratio 1.03.  Small superior segment left lower lobe calcified granuloma noted.  Dependent atelectasis at the bases.  More focal ill-defined opacification of the left lower lobe could reflect pneumonia " versus infarct.  Trace loculated left pleural effusion.  Mildly prominent mediastinal and left hilar lymph nodes.  No acute abnormalities identified in the abdomen or pelvis.    Other Studies: I have personally reviewed pertinent reports.      Echo 6/24/2024  EF 55%.  Diastolic function normal.  Mild mitral regurgitation.  Mild tricuspid regurgitation.  Estimated pulmonary artery systolic pressure 28 mmHg.

## 2024-07-25 NOTE — ASSESSMENT & PLAN NOTE
Completed antibiotics.  No indication for repeat antibiotics at this time.  Check CT chest without contrast end of August to follow-up and ensure resolution.

## 2024-08-24 PROBLEM — J18.9 PNEUMONIA OF LEFT LOWER LOBE DUE TO INFECTIOUS ORGANISM: Status: RESOLVED | Noted: 2024-06-24 | Resolved: 2024-08-24

## 2024-08-26 ENCOUNTER — HOSPITAL ENCOUNTER (OUTPATIENT)
Dept: CT IMAGING | Facility: HOSPITAL | Age: 64
Discharge: HOME | End: 2024-08-26
Payer: COMMERCIAL

## 2024-08-26 DIAGNOSIS — J18.9 PNEUMONIA OF LEFT LOWER LOBE DUE TO INFECTIOUS ORGANISM: ICD-10-CM

## 2024-08-26 DIAGNOSIS — J90 PLEURAL EFFUSION: ICD-10-CM

## 2024-08-26 PROCEDURE — 71250 CT THORAX DX C-: CPT

## 2024-08-30 ENCOUNTER — TELEPHONE (OUTPATIENT)
Age: 64
End: 2024-08-30

## 2024-09-17 DIAGNOSIS — G95.9 MYELOPATHY (HCC): ICD-10-CM

## 2024-09-17 RX ORDER — OXYCODONE HYDROCHLORIDE 5 MG/1
5 CAPSULE ORAL 2 TIMES DAILY PRN
Qty: 60 CAPSULE | Refills: 0 | Status: SHIPPED | OUTPATIENT
Start: 2024-09-17

## 2024-09-17 NOTE — TELEPHONE ENCOUNTER
Reason for call:   [x] Refill   [] Prior Auth  [] Other:     Office:   [x] PCP/Provider -   [] Specialty/Provider -     Medication: oxyCODONE (OXY-IR) 5 MG capsule     Dose/Frequency:  Take 1 capsule (5 mg total) by mouth 2 (two) times a day as needed for moderate pain Max Daily Amount: 10 mg     Quantity: 60    Pharmacy: [id number]  [place] [address] [phone]    Does the patient have enough for 3 days?   [x] Yes   [] No - Send as HP to POD

## 2024-09-23 ENCOUNTER — APPOINTMENT (OUTPATIENT)
Dept: LAB | Facility: HOSPITAL | Age: 64
End: 2024-09-23
Payer: COMMERCIAL

## 2024-09-23 DIAGNOSIS — R97.20 ELEVATED PSA: ICD-10-CM

## 2024-09-23 LAB — PSA SERPL-MCNC: 0.48 NG/ML (ref 0–4)

## 2024-09-23 PROCEDURE — 36415 COLL VENOUS BLD VENIPUNCTURE: CPT

## 2024-09-23 PROCEDURE — 84153 ASSAY OF PSA TOTAL: CPT

## 2024-09-24 ENCOUNTER — TELEPHONE (OUTPATIENT)
Dept: UROLOGY | Facility: CLINIC | Age: 64
End: 2024-09-24

## 2024-09-24 NOTE — TELEPHONE ENCOUNTER
----- Message from Luis Antonio Rodrigues MD sent at 9/24/2024  9:24 AM EDT -----  Please let him know the good news that his PSA has dropped to 0.478, an excellent drop after TURP.

## 2024-09-24 NOTE — TELEPHONE ENCOUNTER
Contacted patient and made him aware of PSA results and Dr. Rodrigues's note. Patient will plan to follow up as scheduled.

## 2024-10-01 ENCOUNTER — RA CDI HCC (OUTPATIENT)
Dept: OTHER | Facility: HOSPITAL | Age: 64
End: 2024-10-01

## 2024-10-08 ENCOUNTER — OFFICE VISIT (OUTPATIENT)
Dept: FAMILY MEDICINE CLINIC | Facility: CLINIC | Age: 64
End: 2024-10-08
Payer: COMMERCIAL

## 2024-10-08 VITALS
BODY MASS INDEX: 29.63 KG/M2 | HEART RATE: 58 BPM | TEMPERATURE: 98.7 F | WEIGHT: 207 LBS | DIASTOLIC BLOOD PRESSURE: 84 MMHG | OXYGEN SATURATION: 98 % | HEIGHT: 70 IN | SYSTOLIC BLOOD PRESSURE: 130 MMHG

## 2024-10-08 DIAGNOSIS — R76.0 LUPUS ANTICOAGULANT POSITIVE: ICD-10-CM

## 2024-10-08 DIAGNOSIS — G89.29 CHRONIC NECK PAIN: ICD-10-CM

## 2024-10-08 DIAGNOSIS — Z23 ENCOUNTER FOR IMMUNIZATION: ICD-10-CM

## 2024-10-08 DIAGNOSIS — Z00.00 MEDICARE ANNUAL WELLNESS VISIT, SUBSEQUENT: Primary | ICD-10-CM

## 2024-10-08 DIAGNOSIS — M54.2 CHRONIC NECK PAIN: ICD-10-CM

## 2024-10-08 DIAGNOSIS — G95.89 MYELOMALACIA OF CERVICAL CORD (HCC): ICD-10-CM

## 2024-10-08 DIAGNOSIS — Z86.711 PERSONAL HISTORY OF PULMONARY EMBOLISM: ICD-10-CM

## 2024-10-08 DIAGNOSIS — N40.0 BPH WITHOUT OBSTRUCTION/LOWER URINARY TRACT SYMPTOMS: ICD-10-CM

## 2024-10-08 DIAGNOSIS — E78.2 MIXED HYPERLIPIDEMIA: ICD-10-CM
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
## 2024-10-08 PROBLEM — S40.861A TICK BITE OF RIGHT UPPER ARM: Status: RESOLVED | Noted: 2024-05-07 | Resolved: 2024-10-08

## 2024-10-08 PROBLEM — I26.99 BILATERAL PULMONARY EMBOLISM (HCC): Status: RESOLVED | Noted: 2024-06-23 | Resolved: 2024-10-08

## 2024-10-08 PROBLEM — J90 PLEURAL EFFUSION: Status: RESOLVED | Noted: 2024-07-25 | Resolved: 2024-10-08

## 2024-10-08 PROCEDURE — 99213 OFFICE O/P EST LOW 20 MIN: CPT | Performed by: INTERNAL MEDICINE

## 2024-10-08 PROCEDURE — 90677 PCV20 VACCINE IM: CPT | Performed by: INTERNAL MEDICINE

## 2024-10-08 PROCEDURE — G0009 ADMIN PNEUMOCOCCAL VACCINE: HCPCS | Performed by: INTERNAL MEDICINE

## 2024-10-08 PROCEDURE — G0439 PPPS, SUBSEQ VISIT: HCPCS | Performed by: INTERNAL MEDICINE

## 2024-10-08 RX ORDER — CYCLOBENZAPRINE HCL 5 MG
TABLET ORAL
COMMUNITY
Start: 2024-08-06

## 2024-10-08 NOTE — ASSESSMENT & PLAN NOTE
To see physiatry in the near future.  Physical therapy, may need pain management and/or evaluation for surgical opinion, although I see nothing on the MRI disc just surgery will be necessary.

## 2024-10-08 NOTE — PATIENT INSTRUCTIONS
Medicare Preventive Visit Patient Instructions  Thank you for completing your Welcome to Medicare Visit or Medicare Annual Wellness Visit today. Your next wellness visit will be due in one year (10/9/2025).  The screening/preventive services that you may require over the next 5-10 years are detailed below. Some tests may not apply to you based off risk factors and/or age. Screening tests ordered at today's visit but not completed yet may show as past due. Also, please note that scanned in results may not display below.  Preventive Screenings:  Service Recommendations Previous Testing/Comments   Colorectal Cancer Screening  Colonoscopy    Fecal Occult Blood Test (FOBT)/Fecal Immunochemical Test (FIT)  Fecal DNA/Cologuard Test  Flexible Sigmoidoscopy Age: 45-75 years old   Colonoscopy: every 10 years (May be performed more frequently if at higher risk)  OR  FOBT/FIT: every 1 year  OR  Cologuard: every 3 years  OR  Sigmoidoscopy: every 5 years  Screening may be recommended earlier than age 45 if at higher risk for colorectal cancer. Also, an individualized decision between you and your healthcare provider will decide whether screening between the ages of 76-85 would be appropriate. Colonoscopy: 11/02/2016  FOBT/FIT: Not on file  Cologuard: Not on file  Sigmoidoscopy: Not on file    Screening Current     Prostate Cancer Screening Individualized decision between patient and health care provider in men between ages of 55-69   Medicare will cover every 12 months beginning on the day after your 50th birthday PSA: 0.478 ng/mL     Screening Current     Hepatitis C Screening Once for adults born between 1945 and 1965  More frequently in patients at high risk for Hepatitis C Hep C Antibody: 05/21/2024    Screening Current   Diabetes Screening 1-2 times per year if you're at risk for diabetes or have pre-diabetes Fasting glucose: 103 mg/dL (5/21/2024)  A1C: 5.3 % (4/14/2021)  Screening Current   Cholesterol Screening Once every  5 years if you don't have a lipid disorder. May order more often based on risk factors. Lipid panel: 05/21/2024  Screening Not Indicated  History Lipid Disorder      Other Preventive Screenings Covered by Medicare:  Abdominal Aortic Aneurysm (AAA) Screening: covered once if your at risk. You're considered to be at risk if you have a family history of AAA or a male between the age of 65-75 who smoking at least 100 cigarettes in your lifetime.  Lung Cancer Screening: covers low dose CT scan once per year if you meet all of the following conditions: (1) Age 55-77; (2) No signs or symptoms of lung cancer; (3) Current smoker or have quit smoking within the last 15 years; (4) You have a tobacco smoking history of at least 20 pack years (packs per day x number of years you smoked); (5) You get a written order from a healthcare provider.  Glaucoma Screening: covered annually if you're considered high risk: (1) You have diabetes OR (2) Family history of glaucoma OR (3)  aged 50 and older OR (4)  American aged 65 and older  Osteoporosis Screening: covered every 2 years if you meet one of the following conditions: (1) Have a vertebral abnormality; (2) On glucocorticoid therapy for more than 3 months; (3) Have primary hyperparathyroidism; (4) On osteoporosis medications and need to assess response to drug therapy.  HIV Screening: covered annually if you're between the age of 15-65. Also covered annually if you are younger than 15 and older than 65 with risk factors for HIV infection. For pregnant patients, it is covered up to 3 times per pregnancy.    Immunizations:  Immunization Recommendations   Influenza Vaccine Annual influenza vaccination during flu season is recommended for all persons aged >= 6 months who do not have contraindications   Pneumococcal Vaccine   * Pneumococcal conjugate vaccine = PCV13 (Prevnar 13), PCV15 (Vaxneuvance), PCV20 (Prevnar 20)  * Pneumococcal polysaccharide vaccine =  PPSV23 (Pneumovax) Adults 19-65 yo with certain risk factors or if 65+ yo  If never received any pneumonia vaccine: recommend Prevnar 20 (PCV20)  Give PCV20 if previously received 1 dose of PCV13 or PPSV23   Hepatitis B Vaccine 3 dose series if at intermediate or high risk (ex: diabetes, end stage renal disease, liver disease)   Respiratory syncytial virus (RSV) Vaccine - COVERED BY MEDICARE PART D  * RSVPreF3 (Arexvy) CDC recommends that adults 60 years of age and older may receive a single dose of RSV vaccine using shared clinical decision-making (SCDM)   Tetanus (Td) Vaccine - COST NOT COVERED BY MEDICARE PART B Following completion of primary series, a booster dose should be given every 10 years to maintain immunity against tetanus. Td may also be given as tetanus wound prophylaxis.   Tdap Vaccine - COST NOT COVERED BY MEDICARE PART B Recommended at least once for all adults. For pregnant patients, recommended with each pregnancy.   Shingles Vaccine (Shingrix) - COST NOT COVERED BY MEDICARE PART B  2 shot series recommended in those 19 years and older who have or will have weakened immune systems or those 50 years and older     Health Maintenance Due:      Topic Date Due   • Lung Cancer Screening  08/26/2025   • Colorectal Cancer Screening  11/02/2026   • Hepatitis C Screening  Completed     Immunizations Due:      Topic Date Due   • Pneumococcal Vaccine: 65+ Years (1 of 2 - PCV) Never done   • Influenza Vaccine (1) 09/01/2024   • COVID-19 Vaccine (1 - 2023-24 season) Never done     Advance Directives   What are advance directives?  Advance directives are legal documents that state your wishes and plans for medical care. These plans are made ahead of time in case you lose your ability to make decisions for yourself. Advance directives can apply to any medical decision, such as the treatments you want, and if you want to donate organs.   What are the types of advance directives?  There are many types of advance  directives, and each state has rules about how to use them. You may choose a combination of any of the following:  Living will:  This is a written record of the treatment you want. You can also choose which treatments you do not want, which to limit, and which to stop at a certain time. This includes surgery, medicine, IV fluid, and tube feedings.   Durable power of  for healthcare (DPAHC):  This is a written record that states who you want to make healthcare choices for you when you are unable to make them for yourself. This person, called a proxy, is usually a family member or a friend. You may choose more than 1 proxy.  Do not resuscitate (DNR) order:  A DNR order is used in case your heart stops beating or you stop breathing. It is a request not to have certain forms of treatment, such as CPR. A DNR order may be included in other types of advance directives.  Medical directive:  This covers the care that you want if you are in a coma, near death, or unable to make decisions for yourself. You can list the treatments you want for each condition. Treatment may include pain medicine, surgery, blood transfusions, dialysis, IV or tube feedings, and a ventilator (breathing machine).  Values history:  This document has questions about your views, beliefs, and how you feel and think about life. This information can help others choose the care that you would choose.  Why are advance directives important?  An advance directive helps you control your care. Although spoken wishes may be used, it is better to have your wishes written down. Spoken wishes can be misunderstood, or not followed. Treatments may be given even if you do not want them. An advance directive may make it easier for your family to make difficult choices about your care.   Weight Management   Why it is important to manage your weight:  Being overweight increases your risk of health conditions such as heart disease, high blood pressure, type 2  diabetes, and certain types of cancer. It can also increase your risk for osteoarthritis, sleep apnea, and other respiratory problems. Aim for a slow, steady weight loss. Even a small amount of weight loss can lower your risk of health problems.  How to lose weight safely:  A safe and healthy way to lose weight is to eat fewer calories and get regular exercise. You can lose up about 1 pound a week by decreasing the number of calories you eat by 500 calories each day.   Healthy meal plan for weight management:  A healthy meal plan includes a variety of foods, contains fewer calories, and helps you stay healthy. A healthy meal plan includes the following:  Eat whole-grain foods more often.  A healthy meal plan should contain fiber. Fiber is the part of grains, fruits, and vegetables that is not broken down by your body. Whole-grain foods are healthy and provide extra fiber in your diet. Some examples of whole-grain foods are whole-wheat breads and pastas, oatmeal, brown rice, and bulgur.  Eat a variety of vegetables every day.  Include dark, leafy greens such as spinach, kale, justin greens, and mustard greens. Eat yellow and orange vegetables such as carrots, sweet potatoes, and winter squash.   Eat a variety of fruits every day.  Choose fresh or canned fruit (canned in its own juice or light syrup) instead of juice. Fruit juice has very little or no fiber.  Eat low-fat dairy foods.  Drink fat-free (skim) milk or 1% milk. Eat fat-free yogurt and low-fat cottage cheese. Try low-fat cheeses such as mozzarella and other reduced-fat cheeses.  Choose meat and other protein foods that are low in fat.  Choose beans or other legumes such as split peas or lentils. Choose fish, skinless poultry (chicken or turkey), or lean cuts of red meat (beef or pork). Before you cook meat or poultry, cut off any visible fat.   Use less fat and oil.  Try baking foods instead of frying them. Add less fat, such as margarine, sour cream,  regular salad dressing and mayonnaise to foods. Eat fewer high-fat foods. Some examples of high-fat foods include french fries, doughnuts, ice cream, and cakes.  Eat fewer sweets.  Limit foods and drinks that are high in sugar. This includes candy, cookies, regular soda, and sweetened drinks.  Exercise:  Exercise at least 30 minutes per day on most days of the week. Some examples of exercise include walking, biking, dancing, and swimming. You can also fit in more physical activity by taking the stairs instead of the elevator or parking farther away from stores. Ask your healthcare provider about the best exercise plan for you.   Narcotic (Opioid) Safety    Use narcotics safely:  Take prescribed narcotics exactly as directed  Do not give narcotics to others or take narcotics that belong to someone else  Do not mix narcotics without medicines or alcohol  Do not drive or operate heavy machinery after you take the narcotic  Monitor for side effects and notify your healthcare provider if you experienced side effects such as nausea, sleepiness, itching, or trouble thinking clearly.    Manage constipation:    Constipation is the most common side effect of narcotic medicine. Constipation is when you have hard, dry bowel movements, or you go longer than usual between bowel movements. Tell your healthcare provider about all changes in your bowel movements while you are taking narcotics. He or she may recommend laxative medicine to help you have a bowel movement. He or she may also change the kind of narcotic you are taking, or change when you take it. The following are more ways you can prevent or relieve constipation:    Drink liquids as directed.  You may need to drink extra liquids to help soften and move your bowels. Ask how much liquid to drink each day and which liquids are best for you.  Eat high-fiber foods.  This may help decrease constipation by adding bulk to your bowel movements. High-fiber foods include fruits,  vegetables, whole-grain breads and cereals, and beans. Your healthcare provider or dietitian can help you create a high-fiber meal plan. Your provider may also recommend a fiber supplement if you cannot get enough fiber from food.  Exercise regularly.  Regular physical activity can help stimulate your intestines. Walking is a good exercise to prevent or relieve constipation. Ask which exercises are best for you.  Schedule a time each day to have a bowel movement.  This may help train your body to have regular bowel movements. Bend forward while you are on the toilet to help move the bowel movement out. Sit on the toilet for at least 10 minutes, even if you do not have a bowel movement.    Store narcotics safely:   Store narcotics where others cannot easily get them.  Keep them in a locked cabinet or secure area. Do not  keep them in a purse or other bag you carry with you. A person may be looking for something else and find the narcotics.  Make sure narcotics are stored out of the reach of children.  A child can easily overdose on narcotics. Narcotics may look like candy to a small child.    The best way to dispose of narcotics:      The laws vary by country and area. In the [place], the best way is to return the narcotics through a take-back program. This program is offered by the [place] Drug Enforcement Agency (SOT[employer]). The following are options for using the program:  Take the narcotics to a SOT[employer] collection site.  The site is often a law enforcement center. Call your local law enforcement center for scheduled take-back days in your area. You will be given information on where to go if the collection site is in a different location.  Take the narcotics to an approved pharmacy or hospital.  A pharmacy or hospital may be set up as a collection site. You will need to ask if it is a [employer]OT[employer] collection site if you were not directed there. A pharmacy or doctor's office may not be able to take back narcotics unless it is a [employer]  site.  Use a mail-back system.  This means you are given containers to put the narcotics into. You will then mail them in the containers.  Use a take-back drop box.  This is a place to leave the narcotics at any time. People and animals will not be able to get into the box. Your local law enforcement agency can tell you where to find a drop box in your area.    Other ways to manage pain:   Ask your healthcare provider about non-narcotic medicines to control pain.  Nonprescription medicines include NSAIDs (such as ibuprofen) and acetaminophen. Prescription medicines include muscle relaxers, antidepressants, and steroids.  Pain may be managed without any medicines.  Some ways to relieve pain include massage, aromatherapy, or meditation. Physical or occupational therapy may also help.    For more information:   Drug Enforcement Administration  [address] [place] [address]  Phone: 1- 708 - [phone]  Web Address: https://www.deadiversion.[facility] [employer] [employer].gov/drug_disposal/     Food and Drug Administration  [address] [place] [address]  Phone: 5- 811 - 655-7423  Web Address: http://www.fda.gov     © Copyright [address] 2018 Information is for End User's use only and may not be sold, redistributed or otherwise used for commercial purposes. All illustrations and images included in CareNotes® are the copyrighted property of [employer] or [employer]

## 2024-10-08 NOTE — PROGRESS NOTES
Ambulatory Visit  Name: Vini Ybarra      : 1960      MRN: [id number]  Encounter Provider: Gui Espinosa DO  Encounter Date: 10/8/2024   Encounter department: [facility] PRIMARY [facility]ARE    Assessment & Plan  Medicare annual wellness visit, subsequent  Reviewed the AWV questionnaire.  Went through standard need for appropriate screening tests based on risk factors..  Reviewed cognitive issues.  Reviewed living will and DURABLE POWER OF .  Discussed healthy lifestyle, healthy diet.  Reviewed vaccines.  Encourage exercise.  Discussed safety issues within the home and about.        Myelomalacia of cervical cord (HCC)  Chronic and unchanged.       Chronic neck pain  To see physiatry in the near future.  Physical therapy, may need pain management and/or evaluation for surgical opinion, although I see nothing on the MRI disc just surgery will be necessary.       Personal history of pulmonary embolism  Remains on Eliquis, history of lupus anticoagulant.       Encounter for immunization  Pneumococcal vaccine given today.  Orders:    Pneumococcal Conjugate Vaccine 20-valent (Pcv20)    BPH without obstruction/lower urinary tract symptoms  PSA with next labs.       Mixed hyperlipidemia  Remains on rosuvastatin.  Will check lipid profile.  Orders:    CBC and differential; Future    Comprehensive metabolic panel; Future    Lipid Panel with Direct LDL reflex; Future    Lupus anticoagulant positive  Remains on Eliquis.          Preventive health issues were discussed with patient, and age appropriate screening tests were ordered as noted in patient's After Visit Summary. Personalized health advice and appropriate referrals for health education or preventive services given if needed, as noted in patient's After Visit Summary.    History of Present Illness     Patient presents for their annual medical wellness visit.  Reviewed medical intake questionnaire.  Discussed living will and DURABLE POWER OF  .  Discussed importance of these 2 documents.  Reviewed the various screening modalities the patient was due for.  Reviewed home safety as well as depression and risk of falling.  Through shared medical decision making move forward with testing or blood work as ordered.  The patient presents with his persistent cervical pain.  Recent MRI reviewed.  Has follow-up with physiatry in the near future.  Considering seeing neurosurgery and orthopedic surgery.    Currently denies chest pain or shortness of breath.  No nausea or vomiting.  No recent weight gain or weight loss.  Non-smoker.  Struggles every day with his chronic neck pain, and has discomfort in both of his arms ever since his surgery.  Status post C1-C2 fusion.       Patient Care Team:  Gui Espinosa DO as PCP - General (Internal Medicine)  Luis Antonio Rodrigues MD (Urology)    Review of Systems   Constitutional:  Negative for chills and fever.   HENT:  Negative for congestion and sore throat.    Eyes:  Negative for pain and visual disturbance.   Respiratory:  Negative for cough and shortness of breath.    Cardiovascular:  Negative for chest pain and palpitations.   Gastrointestinal:  Negative for abdominal pain and vomiting.   Genitourinary:  Negative for dysuria and hematuria.   Musculoskeletal:  Positive for arthralgias, myalgias and neck pain. Negative for back pain.   Skin:  Negative for color change and rash.   Neurological:  Negative for seizures and syncope.   All other systems reviewed and are negative.    Medical History Reviewed by provider this encounter:       Annual Wellness Visit Questionnaire   Vini is here for his Subsequent Wellness visit. Last Medicare Wellness visit information reviewed, patient interviewed, no change since last AWV.     Health Risk Assessment:   Patient rates overall health as very good. Patient feels that their physical health rating is same. Patient is satisfied with their life. Eyesight was rated as same. Hearing was  rated as same. Patient feels that their emotional and mental health rating is same. Patients states they are never, rarely angry. Patient states they are sometimes unusually tired/fatigued. Pain experienced in the last 7 days has been a lot. Patient's pain rating has been 8/10. Patient states that he has experienced no weight loss or gain in last 6 months.     Depression Screening:   PHQ-2 Score: 0      Fall Risk Screening:   In the past year, patient has experienced: no history of falling in past year      Home Safety:  Patient does not have trouble with stairs inside or outside of their home. Patient has working smoke alarms and has working carbon monoxide detector. Home safety hazards include: none.     Nutrition:   Current diet is Regular.     Medications:   Patient is currently taking over-the-counter supplements. OTC medications include: see medication list. Patient is able to manage medications.     Activities of Daily Living (ADLs)/Instrumental Activities of Daily Living (IADLs):   Walk and transfer into and out of bed and chair?: No  Dress and groom yourself?: No    Bathe or shower yourself?: No    Feed yourself? No  Do your laundry/housekeeping?: No  Manage your money, pay your bills and track your expenses?: No  Make your own meals?: No    Do your own shopping?: No    Previous Hospitalizations:   Any hospitalizations or ED visits within the last 12 months?: Yes    How many hospitalizations have you had in the last year?: 1-2    Advance Care Planning:   Living will: Yes    Durable POA for healthcare: Yes    Advanced directive: Yes    Advanced directive counseling given: Yes    ACP document given: Yes    Patient declined ACP directive: No    End of Life Decisions reviewed with patient: Yes    Provider agrees with end of life decisions: Yes      Cognitive Screening:   Provider or family/friend/caregiver concerned regarding cognition?: No    PREVENTIVE SCREENINGS      Cardiovascular Screening:    General:  Screening Not Indicated and History Lipid Disorder      Diabetes Screening:     General: Screening Current      Colorectal Cancer Screening:     General: Screening Current      Prostate Cancer Screening:    General: Screening Current      Osteoporosis Screening:    General: Screening Not Indicated      Abdominal Aortic Aneurysm (AAA) Screening:    Risk factors include: age between 65-74 yo and tobacco use        General: Screening Not Indicated      Lung Cancer Screening:     General: Screening Current      Hepatitis C Screening:    General: Screening Current    Screening, Brief Intervention, and Referral to Treatment (SBIRT)    Screening  Typical number of drinks in a day: 0  Typical number of drinks in a week: 2  Interpretation: Low risk drinking behavior.    Single Item Drug Screening:  How often have you used an illegal drug (including marijuana) or a prescription medication for non-medical reasons in the past year? never    Single Item Drug Screen Score: 0  Interpretation: Negative screen for possible drug use disorder    Review of Current Opioid Use    Opioid Risk Tool (ORT) Interpretation: Complete Opioid Risk Tool (ORT)    Other Counseling Topics:   Car/seat belt/driving safety, sunscreen and regular weightbearing exercise.     Social Determinants of Health     Financial Resource Strain: Low Risk  (9/29/2023)    Overall Financial Resource Strain (CARDIA)     Difficulty of Paying Living Expenses: Not hard at all   Food Insecurity: No Food Insecurity (10/8/2024)    Hunger Vital Sign     Worried About Running Out of Food in the Last Year: Never true     Ran Out of Food in the Last Year: Never true   Transportation Needs: No Transportation Needs (10/8/2024)    PRAPARE - Transportation     Lack of Transportation (Medical): No     Lack of Transportation (Non-Medical): No   Housing Stability: Unknown (10/8/2024)    Housing Stability Vital Sign     Unable to Pay for Housing in the Last Year: No     Homeless in the  "Last Year: No   Utilities: Not At Risk (10/8/2024)    [facility] Utilities     Threatened with loss of utilities: No     No results found.    Objective     /84 (BP Location: Left arm, Patient Position: Sitting, Cuff Size: Large)   Pulse 58   Temp 98.7 °F (37.1 °C) (Tympanic)   Ht 5' 10\" (1.778 m)   Wt 93.9 kg (207 lb)   SpO2 98%   BMI 29.70 kg/m²     Physical Exam  Vitals and nursing note reviewed.   Constitutional:       General: He is not in acute distress.     Appearance: Normal appearance. He is well-developed.   HENT:      Head: Normocephalic and atraumatic.   Eyes:      Conjunctiva/sclera: Conjunctivae normal.   Neck:      Comments: Tender cervical paravertebral musculature with spasm  Cardiovascular:      Rate and Rhythm: Normal rate and regular rhythm.      Pulses: Normal pulses.      Heart sounds: Normal heart sounds. No murmur heard.  Pulmonary:      Effort: Pulmonary effort is normal. No respiratory distress.      Breath sounds: Normal breath sounds.   Abdominal:      Palpations: Abdomen is soft.      Tenderness: There is no abdominal tenderness.   Musculoskeletal:         General: Tenderness present. No swelling.      Cervical back: Neck supple. Tenderness present.      Comments: Decreased ROM of c spine   Skin:     General: Skin is warm and dry.      Capillary Refill: Capillary refill takes less than 2 seconds.   Neurological:      General: No focal deficit present.      Mental Status: He is alert and oriented to person, place, and time.      Motor: No weakness.      Gait: Gait normal.      Deep Tendon Reflexes: Reflexes normal.      Comments: No weakness in hands or legs.  No hyperreflexia noted.  No loss of muscle tone.   Psychiatric:         Mood and Affect: Mood normal.         "

## 2024-10-10 DIAGNOSIS — I26.99 BILATERAL PULMONARY EMBOLISM (HCC): ICD-10-CM

## 2024-10-11 RX ORDER — APIXABAN 5 MG/1
5 TABLET, FILM COATED ORAL 2 TIMES DAILY
Qty: 180 TABLET | Refills: 1 | Status: SHIPPED | OUTPATIENT
Start: 2024-10-11

## 2024-11-07 ENCOUNTER — OFFICE VISIT (OUTPATIENT)
Dept: PULMONOLOGY | Facility: CLINIC | Age: 64
End: 2024-11-07
Payer: COMMERCIAL

## 2024-11-07 VITALS
BODY MASS INDEX: 30.06 KG/M2 | DIASTOLIC BLOOD PRESSURE: 77 MMHG | SYSTOLIC BLOOD PRESSURE: 120 MMHG | OXYGEN SATURATION: 97 % | HEART RATE: 64 BPM | TEMPERATURE: 98 F | HEIGHT: 70 IN | WEIGHT: 210 LBS

## 2024-11-07 DIAGNOSIS — I26.99 BILATERAL PULMONARY EMBOLISM (HCC): Primary | ICD-10-CM

## 2024-11-07 PROBLEM — Z00.00 MEDICARE ANNUAL WELLNESS VISIT, SUBSEQUENT: Status: RESOLVED | Noted: 2023-09-29 | Resolved: 2024-11-07

## 2024-11-07 PROCEDURE — 99213 OFFICE O/P EST LOW 20 MIN: CPT

## 2024-11-07 NOTE — ASSESSMENT & PLAN NOTE
-Hospitalized in June 2024 with b/l PE without right heart strain on TTE. Unprovoked. This is his second VTE event, had a DVT 10 years ago. Indefinite AC recommended. Was noted to have positive lupus anticoagulant on admission.  -His lungs are clear on exam today, is not having any SOB or chest pain. SPO2 97% on room air    Plan:  -Will repeat lupus anticoagulant next month after he reaches 6 months of Eliquis therapy.  Instructed patient to stop taking 4 days prior to blood work then resume.  Also recommend referral to hematology for further input on length of anticoagulant therapy as patient's goal is to come off of this completely.

## 2024-11-07 NOTE — PROGRESS NOTES
Pulmonary Follow Up Note  Vini Ybarra 64 y.o. male MRN: [id number]  11/7/2024    Assessment/Plan:    Bilateral pulmonary embolism (HCC)  -Hospitalized in June 2024 with b/l PE without right heart strain on TTE. Unprovoked. This is his second VTE event, had a DVT 10 years ago. Indefinite AC recommended. Was noted to have positive lupus anticoagulant on admission.  -His lungs are clear on exam today, is not having any SOB or chest pain. SPO2 97% on room air    Plan:  -Will repeat lupus anticoagulant next month after he reaches 6 months of Eliquis therapy.  Instructed patient to stop taking 4 days prior to blood work then resume.  Also recommend referral to hematology for further input on length of anticoagulant therapy as patient's goal is to come off of this completely.        Diagnoses and all orders for this visit:    Bilateral pulmonary embolism (HCC)  -     Lupus anticoagulant; Future  -     Ambulatory Referral to Hematology / Oncology; Future      Return in about 2 months (around 1/7/2025).    History of Present Illness     Chief Complaint: No chief complaint on file.      Patient ID: Vini is a 64 y.o. y.o. male has a past medical history of Arthritis, Benign prostatic hyperplasia with incomplete bladder emptying (02/28/2024), Bilateral pulmonary embolism (HCC) (06/23/2024), Chronic pain disorder, COVID (2021), GERD (gastroesophageal reflux disease), and Spinal cord dysplasia (HCC).      11/7/2024  HPI: Vini Ybarra is a 64 y.o. male with a PMH of GERD, osteoarthritis, chronic opioid dependence, myelomalacia of the cervical cord s/p cervical fusion 10 years ago, and remote history of DVT 10 years ago.  He is here today for a follow-up visit for pulmonary embolism.  He was hospitalized in June for acute bilateral pulmonary emboli as well as left lower lobe pneumonia and pleural effusion.  Did not require thoracentesis and repeat imaging in August showed resolution of prior infiltrate and pleural effusion.  He  has been maintained on Eliquis for a minimum of 6 months, however due to 2 separate VTE's, indefinite anticoagulation recommended.  He has not seen hematology, however was referred.  He had a positive lupus anticoagulant on admission.        Review of Systems   Constitutional:  Negative for activity change, chills, fever and unexpected weight change.   HENT:  Negative for congestion, postnasal drip, rhinorrhea, sore throat and trouble swallowing.    Respiratory:  Negative for cough, chest tightness, shortness of breath and wheezing.    Cardiovascular:  Negative for chest pain, palpitations and leg swelling.   Allergic/Immunologic: Negative.        Historical Information   Past Medical History:   Diagnosis Date    Arthritis     Benign prostatic hyperplasia with incomplete bladder emptying 02/28/2024    Bilateral pulmonary embolism (HCC) 06/23/2024    Chronic pain disorder     COVID 2021    GERD (gastroesophageal reflux disease)     Spinal cord dysplasia (HCC)      Past Surgical History:   Procedure Laterality Date    CERVICAL FUSION      EGD      HERNIA REPAIR Left     NASAL SEPTOPLASTY W/ TURBINOPLASTY  08/22/2003    UT BX PROSTATE STRTCTC SATURATION SAMPLING IMG GID N/A 11/22/2023    Procedure: TRANSPERINEAL ULTRASOUND GUIDED BIOPSY PROSTATE;  Surgeon: Luis Antonio Rodrigues MD;  Location: [facility];  Service: Urology    UT TRURL ELECTROSURG RESCJ PROSTATE BLEED COMPLETE N/A 2/28/2024    Procedure: TRANSURETHRAL RESECTION OF PROSTATE (TURP);  Surgeon: Luis Antonio Rodrigues MD;  Location: [facility];  Service: Urology    TOOTH EXTRACTION       Family History   Problem Relation Age of Onset    Cancer Father     Lung cancer Brother     Cancer Son        Smoking history: He reports that he has quit smoking. His smoking use included cigarettes. He started smoking about 50 years ago. He has a 20 pack-year smoking history. He has been exposed to tobacco smoke. He has never used smokeless tobacco.    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  "History   Administered Date(s) Administered    INFLUENZA 10/26/2020    Pneumococcal Conjugate Vaccine 20-valent (Pcv20), Polysace 10/08/2024    Tdap 05/06/2024       Meds/Allergies     Current Outpatient Medications:     apixaban (Eliquis) 5 mg, take 1 tablet by mouth twice a day, Disp: 180 tablet, Rfl: 1    cholecalciferol (VITAMIN D3) 1,000 units tablet, Take 1,000 Units by mouth daily, Disp: , Rfl:     cyclobenzaprine (FLEXERIL) 5 mg tablet, take 1-2 tablets by mouth three times a day if needed for muscle spasm S (Patient not taking: Reported on 10/8/2024), Disp: , Rfl:     gabapentin (NEURONTIN) 300 mg capsule, Take 300 mg by mouth 3 (three) times a day as needed, Disp: , Rfl:     ketoconazole (NIZORAL) 2 % cream, apply to affected area twice a day ON FACE TILL CLEAR FOR FLARES, Disp: , Rfl:     nortriptyline (PAMELOR) 10 mg capsule, take 1 capsule by mouth at bedtime for 4 weeks then INCREASE to 2...  (REFER TO PRESCRIPTION NOTES)., Disp: , Rfl:     omeprazole (PriLOSEC) 40 MG capsule, Take 1 capsule (40 mg total) by mouth daily, Disp: 90 capsule, Rfl: 1    oxyCODONE (OXY-IR) 5 MG capsule, Take 1 capsule (5 mg total) by mouth 2 (two) times a day as needed for moderate pain Max Daily Amount: 10 mg, Disp: 60 capsule, Rfl: 0    rosuvastatin (CRESTOR) 5 mg tablet, Take 1 tablet (5 mg total) by mouth daily, Disp: 90 tablet, Rfl: 3    Zinc 50 MG CAPS, Take by mouth, Disp: , Rfl:     Allergies:   Allergies   Allergen Reactions    Acetaminophen Other (See Comments)     Other reaction(s): TONGUE LIPS NOSE TINGLING SENSAT           Vitals:  Vitals:    11/07/24 0953 11/07/24 1034   BP: 120/77    BP Location: Left arm    Patient Position: Sitting    Cuff Size: Standard    Pulse: 64    Temp: 98 °F (36.7 °C)    TempSrc: Temporal    SpO2: 96% 97%   Weight: 95.3 kg (210 lb)    Height: 5' 10\" (1.778 m)    Oxygen Therapy  SpO2: 97 %  Oxygen Therapy: None (Room air)  .  Wt Readings from Last 3 Encounters:   11/07/24 95.3 kg (210 " "lb)   10/08/24 93.9 kg (207 lb)   07/25/24 93 kg (205 lb)     Body mass index is 30.13 kg/m².    Physical Exam  Vitals and nursing note reviewed.   Constitutional:       General: He is not in acute distress.     Appearance: Normal appearance. He is well-developed.   Cardiovascular:      Rate and Rhythm: Normal rate and regular rhythm.      Heart sounds: Normal heart sounds. No murmur heard.  Pulmonary:      Effort: Pulmonary effort is normal. No respiratory distress.      Breath sounds: Normal breath sounds. No decreased breath sounds, wheezing, rhonchi or rales.   Musculoskeletal:         General: No swelling.      Right lower leg: No edema.      Left lower leg: No edema.   Psychiatric:         Mood and Affect: Mood and affect normal.         Behavior: Behavior normal. Behavior is cooperative.           Labs: I have personally reviewed pertinent lab results.  Lab Results   Component Value Date    WBC 7.57 06/25/2024    HGB 13.9 06/25/2024    HCT 41.7 06/25/2024    MCV 91 06/25/2024     06/25/2024     Lab Results   Component Value Date    GLUCOSE 89 06/07/2014    CALCIUM 10.0 06/25/2024     06/07/2014    K 4.1 06/25/2024    CO2 27 06/25/2024     06/25/2024    BUN 14 06/25/2024    CREATININE 1.09 06/25/2024     No results found for: \"IGE\"  Lab Results   Component Value Date    ALT 19 06/24/2024    AST 16 06/24/2024    ALKPHOS 63 06/24/2024    BILITOT 0.3 06/07/2014       Studies:    Imaging and other studies: I have personally reviewed pertinent reports and I have personally reviewed pertinent films in PACS     CT Chest 8/26/24  Resolution of the previously seen LLL consolidation and left pleural effusion.    CTA Chest 6/23/24  1. Small peripheral bilateral pulmonary arterial filling defects consistent with acute pulmonary emboli. Somewhat elevated RV/LV ratio at 1.03.  2. Left bibasilar atelectasis. More focal dependent left lower lobe opacification could also represent pneumonia. Small loculated " left pleural effusion.  3. Mildly enlarged mediastinal and left hilar lymph nodes, likely reactive in nature.    EKG, Pathology, and Other Studies: I have personally reviewed pertinent reports.        ECHO 6/24/24    Left Ventricle: Left ventricular cavity size is normal. Wall thickness is normal. The left ventricular ejection fraction is 55%. Systolic function is normal. Wall motion is normal. Diastolic function is normal.    Left Atrium: The atrium is mildly dilated.    Aortic Valve: There is aortic valve sclerosis.    Mitral Valve: There is mild regurgitation.    Tricuspid Valve: There is mild regurgitation.    Pulmonary Artery: The estimated pulmonary artery systolic pressure is 28.0 mmHg.

## 2024-11-14 ENCOUNTER — OFFICE VISIT (OUTPATIENT)
Dept: UROLOGY | Facility: CLINIC | Age: 64
End: 2024-11-14
Payer: COMMERCIAL

## 2024-11-14 VITALS
WEIGHT: 216 LBS | HEIGHT: 70 IN | TEMPERATURE: 97.8 F | DIASTOLIC BLOOD PRESSURE: 78 MMHG | HEART RATE: 66 BPM | BODY MASS INDEX: 30.92 KG/M2 | SYSTOLIC BLOOD PRESSURE: 122 MMHG | OXYGEN SATURATION: 98 %

## 2024-11-14 DIAGNOSIS — N40.1 BPH WITH OBSTRUCTION/LOWER URINARY TRACT SYMPTOMS: ICD-10-CM

## 2024-11-14 DIAGNOSIS — R33.9 INCOMPLETE BLADDER EMPTYING: Primary | ICD-10-CM

## 2024-11-14 DIAGNOSIS — N13.8 BPH WITH OBSTRUCTION/LOWER URINARY TRACT SYMPTOMS: ICD-10-CM

## 2024-11-14 DIAGNOSIS — R97.20 ELEVATED PSA: ICD-10-CM

## 2024-11-14 LAB — POST-VOID RESIDUAL VOLUME, ML POC: 237 ML

## 2024-11-14 PROCEDURE — 99214 OFFICE O/P EST MOD 30 MIN: CPT | Performed by: UROLOGY

## 2024-11-14 PROCEDURE — 51798 US URINE CAPACITY MEASURE: CPT | Performed by: UROLOGY

## 2024-11-14 NOTE — PROGRESS NOTES
UROLOGY PROGRESS NOTE   [facility]  [address]  [address]  [place] [address]  [phone]  Fax:[phone]  [email or web address]      NAME: Vini Ybarra  AGE: 64 y.o. SEX: male  : 1960   MRN: [id number]    DATE: 2024  TIME: 11:08 AM    Assessment and Plan:    BPH with obstruction status post TURP 2024-basically doing better.  PVR believe is still elevated because we need to check the PVR immediately after urinating because he drinks a lot of water and produces high amounts of urine quickly.    Chronically elevated PSA, decreased to 0.4 after TURP.  Recheck that in 1 year.  No evidence of malignancy in the resection.    Some decrease in libido and erectile dysfunction-we went over his medications which 3 of them that he is on that he does not take very often but they can affect his libido as well as of his erections, and also the psychological effect of having a TURP and having chronic neck pain.  He is currently interested in trying natural things I will call in Viagra or sildenafil upon his request.    Follow-up in 1 year with a PSA.               Chief Complaint   No chief complaint on file.      History of Present Illness   Follow-up BPH with obstruction status post TURP by me 2024.  320 cc PVR prior to TURP.  49 g resection, no malignancy.  I last saw him 2024 he was doing quite well.  PVR is still elevated, basically stable but he did drink a 20 ounce bottle of water on his way down from [place] from physical therapy and he was scanned approximately 15 minutes after urinating.  Therefore he is producing a high amount of urine in a short period of time so future PVRs will have to be done immediately after he urinates.  He has a good stream.  He does get some urge incontinence if he waits too long only occasionally.  Treated 1-2 times a night.  He has a better stream, does not take them as long to urinate, overall pretty satisfied  with the operation.    Diagnosed with pulmonary emboli and pleurisy in May of this year, placed on Eliquis for that.    Prostate cancer screening as below: Denies drop in the PSA status post TURP.  Component  Ref Range & Units (hover) 9/23/24 12:23 PM 2/19/24 10:57 AM 7/19/23  5:16 PM 1/13/23 12:41 PM 11/14/22  2:15 PM 8/10/22 11:34 AM 1/28/22 12:38 PM   PSA, Diagnostic 0.478 4.46 High  R, CM 5.06 High  R, CM 8.8 High  R, CM 15.7 High  R, CM         Component  Ref Range & Units (hover) 11/14/24 10:56 AM 3/19/24  2:45 PM 12/19/23 10:11 AM 1/20/23 11:12 AM 11/17/22  8:39 AM   POST-VOID RESIDUAL VOLUME, ML  [phone]        The following portions of the patient's history were reviewed and updated as appropriate: allergies, current medications, past family history, past medical history, past social history, past surgical history and problem list.  Past Medical History:   Diagnosis Date    Arthritis     Benign prostatic hyperplasia with incomplete bladder emptying 02/28/2024    Bilateral pulmonary embolism (HCC) 06/23/2024    Chronic pain disorder     COVID 2021    GERD (gastroesophageal reflux disease)     Spinal cord dysplasia (HCC)      Past Surgical History:   Procedure Laterality Date    CERVICAL FUSION      EGD      HERNIA REPAIR Left     NASAL SEPTOPLASTY W/ TURBINOPLASTY  08/22/2003    AR BX PROSTATE STRTCTC SATURATION SAMPLING IMG GID N/A 11/22/2023    Procedure: TRANSPERINEAL ULTRASOUND GUIDED BIOPSY PROSTATE;  Surgeon: Luis Antonio Rodrigues MD;  Location: [facility];  Service: Urology    AR TRURL ELECTROSURG RESCJ PROSTATE BLEED COMPLETE N/A 2/28/2024    Procedure: TRANSURETHRAL RESECTION OF PROSTATE (TURP);  Surgeon: Luis Antonio Rodrigues MD;  Location: [facility];  Service: Urology    TOOTH EXTRACTION       shoulder  Review of Systems   Review of Systems   Gastrointestinal: Negative.    Genitourinary:  Negative for hematuria.        As per HPI   Musculoskeletal:  Positive for neck pain. Negative for back pain.  "      Active Problem List     Patient Active Problem List   Diagnosis    Myelopathy (HCC)    Chronic hand pain, right    Arthritis    Myelomalacia of cervical cord (HCC)    Esophageal dysphagia    Gastroesophageal reflux disease    Epigastric pain    BPH without obstruction/lower urinary tract symptoms    Continuous opioid dependence (HCC)    Bilateral pulmonary embolism (HCC)    Lupus anticoagulant positive    Personal history of pulmonary embolism    Chronic neck pain       Objective   /78   Pulse 66   Temp 97.8 °F (36.6 °C)   Ht 5' 10\" (1.778 m)   Wt 98 kg (216 lb)   SpO2 98%   BMI 30.99 kg/m²     Physical Exam  Vitals reviewed.   Constitutional:       Appearance: Normal appearance.   HENT:      Head: Normocephalic and atraumatic.   Eyes:      Extraocular Movements: Extraocular movements intact.   Pulmonary:      Effort: Pulmonary effort is normal.   Musculoskeletal:         General: Normal range of motion.      Cervical back: Normal range of motion.   Skin:     Coloration: Skin is not jaundiced or pale.   Neurological:      General: No focal deficit present.      Mental Status: He is alert and oriented to person, place, and time. Mental status is at baseline.   Psychiatric:         Mood and Affect: Mood normal.         Behavior: Behavior normal.         Thought Content: Thought content normal.         Judgment: Judgment normal.             Current Medications     Current Outpatient Medications:     apixaban (Eliquis) 5 mg, take 1 tablet by mouth twice a day, Disp: 180 tablet, Rfl: 1    cholecalciferol (VITAMIN D3) 1,000 units tablet, Take 1,000 Units by mouth daily, Disp: , Rfl:     gabapentin (NEURONTIN) 300 mg capsule, Take 300 mg by mouth 3 (three) times a day as needed, Disp: , Rfl:     ketoconazole (NIZORAL) 2 % cream, apply to affected area twice a day ON FACE TILL CLEAR FOR FLARES, Disp: , Rfl:     omeprazole (PriLOSEC) 40 MG capsule, Take 1 capsule (40 mg total) by mouth daily, Disp: 90 " capsule, Rfl: 1    oxyCODONE (OXY-IR) 5 MG capsule, Take 1 capsule (5 mg total) by mouth 2 (two) times a day as needed for moderate pain Max Daily Amount: 10 mg, Disp: 60 capsule, Rfl: 0    rosuvastatin (CRESTOR) 5 mg tablet, Take 1 tablet (5 mg total) by mouth daily, Disp: 90 tablet, Rfl: 3    Zinc 50 MG CAPS, Take by mouth, Disp: , Rfl:     cyclobenzaprine (FLEXERIL) 5 mg tablet, take 1-2 tablets by mouth three times a day if needed for muscle spasm S (Patient not taking: Reported on 10/8/2024), Disp: , Rfl:     nortriptyline (PAMELOR) 10 mg capsule, take 1 capsule by mouth at bedtime for 4 weeks then INCREASE to 2...  (REFER TO PRESCRIPTION NOTES). (Patient not taking: Reported on 11/14/2024), Disp: , Rfl:         Luis Antonio Rodrigues MD

## 2024-12-11 ENCOUNTER — APPOINTMENT (OUTPATIENT)
Dept: URGENT CARE | Facility: MEDICAL CENTER | Age: 64
End: 2024-12-11

## 2024-12-19 ENCOUNTER — TELEPHONE (OUTPATIENT)
Age: 64
End: 2024-12-19

## 2024-12-19 NOTE — TELEPHONE ENCOUNTER
Pt called in stating he is just now getting over his cold, but still has a very nasty cough, and it appears to be getting worse.     Pt inquires if Dr. Espinosa could please put in an order for an inhaler?     If so, to please send to the following pharmacy:  [id number] - [place] - [address] [place] [address] [phone]     Also, pt inquires what else could he take OTC for his cough?    Please advise & call pt back with update on his inquiries. Thank you!    Vini Ybarra: [phone]

## 2024-12-20 DIAGNOSIS — J45.20 MILD INTERMITTENT REACTIVE AIRWAY DISEASE WITHOUT COMPLICATION: Primary | ICD-10-CM

## 2024-12-20 RX ORDER — ALBUTEROL SULFATE 90 UG/1
2 INHALANT RESPIRATORY (INHALATION) EVERY 6 HOURS PRN
Qty: 18 G | Refills: 5 | Status: SHIPPED | OUTPATIENT
Start: 2024-12-20

## 2024-12-20 NOTE — TELEPHONE ENCOUNTER
Patient called yesterday about getting something for his cough and no one called him back.  Please contact patient and advise.  Thank you.

## 2024-12-23 ENCOUNTER — OFFICE VISIT (OUTPATIENT)
Dept: FAMILY MEDICINE CLINIC | Facility: CLINIC | Age: 64
End: 2024-12-23
Payer: COMMERCIAL

## 2024-12-23 ENCOUNTER — NURSE TRIAGE (OUTPATIENT)
Age: 64
End: 2024-12-23

## 2024-12-23 VITALS
OXYGEN SATURATION: 97 % | TEMPERATURE: 98.2 F | BODY MASS INDEX: 30.18 KG/M2 | DIASTOLIC BLOOD PRESSURE: 68 MMHG | SYSTOLIC BLOOD PRESSURE: 122 MMHG | HEIGHT: 70 IN | HEART RATE: 77 BPM | WEIGHT: 210.8 LBS

## 2024-12-23 DIAGNOSIS — J40 SINOBRONCHITIS: Primary | ICD-10-CM

## 2024-12-23 DIAGNOSIS — J32.9 SINOBRONCHITIS: Primary | ICD-10-CM

## 2024-12-23 DIAGNOSIS — G95.89 MYELOMALACIA OF CERVICAL CORD (HCC): ICD-10-CM

## 2024-12-23 PROCEDURE — 99213 OFFICE O/P EST LOW 20 MIN: CPT | Performed by: INTERNAL MEDICINE

## 2024-12-23 PROCEDURE — 96372 THER/PROPH/DIAG INJ SC/IM: CPT | Performed by: INTERNAL MEDICINE

## 2024-12-23 RX ORDER — DEXTROMETHORPHAN HYDROBROMIDE AND PROMETHAZINE HYDROCHLORIDE 15; 6.25 MG/5ML; MG/5ML
5 SYRUP ORAL 4 TIMES DAILY PRN
Qty: 118 ML | Refills: 0 | Status: SHIPPED | OUTPATIENT
Start: 2024-12-23

## 2024-12-23 RX ORDER — DEXAMETHASONE SODIUM PHOSPHATE 10 MG/ML
10 INJECTION INTRAMUSCULAR; INTRAVENOUS ONCE
Status: COMPLETED | OUTPATIENT
Start: 2024-12-23 | End: 2024-12-23

## 2024-12-23 RX ORDER — CEFUROXIME AXETIL 250 MG/1
250 TABLET ORAL EVERY 12 HOURS SCHEDULED
Qty: 14 TABLET | Refills: 0 | Status: SHIPPED | OUTPATIENT
Start: 2024-12-23 | End: 2024-12-30

## 2024-12-23 RX ORDER — PREDNISONE 10 MG/1
TABLET ORAL
Qty: 20 TABLET | Refills: 0 | Status: SHIPPED | OUTPATIENT
Start: 2024-12-23

## 2024-12-23 RX ADMIN — DEXAMETHASONE SODIUM PHOSPHATE 10 MG: 10 INJECTION INTRAMUSCULAR; INTRAVENOUS at 15:05

## 2024-12-23 NOTE — PROGRESS NOTES
Name: Vini Ybarra      : 1960      MRN: [id number]  Encounter Provider: Gui Espinosa DO  Encounter Date: 2024   Encounter department: [facility]  :  Assessment & Plan  Sinobronchitis  Given persistence of upper respiratory symptoms as well as the audible wheeze on auscultation, proceed with prednisone taper as well as cefuroxime  Orders:  •  dexamethasone (DECADRON) injection 10 mg  •  promethazine-dextromethorphan (PHENERGAN-DM) 6.25-15 mg/5 mL oral syrup; Take 5 mL by mouth 4 (four) times a day as needed for cough  •  predniSONE 10 mg tablet; 4 pills for 2 days, then 3 pills for 2 days, then 2 pills for 2 days then 1 pill for 2 days.  •  cefuroxime (CEFTIN) 250 mg tablet; Take 1 tablet (250 mg total) by mouth every 12 (twelve) hours for 7 days    Myelomalacia of cervical cord (HCC)  Chronic pain related to cervical injury in the past.              History of Present Illness     Sick for over 10 days.  Started with a low-grade fever and a cough.  Fever has dissipated but his cough persist.  He finds himself short of breath, difficulty laying down without a cough at times.  Bringing up occasional mucus.  Denies any chest pain to speak of.  Bothered by his sinuses as well which are quite congested.  Nobody sick that he is aware of around his immediate relationships.    Recently discontinued 2 medications because he just did not want to be on that many medicines.  Him not sure if they were helping him under.  Difficult historian.      Review of Systems   Constitutional:  Negative for chills and fever.   HENT:  Positive for congestion. Negative for sore throat.    Eyes:  Negative for pain and visual disturbance.   Respiratory:  Positive for cough. Negative for shortness of breath.    Cardiovascular:  Negative for chest pain and palpitations.   Gastrointestinal:  Negative for abdominal pain and vomiting.   Genitourinary:  Negative for dysuria and hematuria.  "  Musculoskeletal:  Positive for arthralgias, back pain, neck pain and neck stiffness.   Skin:  Negative for color change and rash.   Neurological:  Negative for seizures and syncope.   All other systems reviewed and are negative.      Objective   /68 (BP Location: Left arm, Patient Position: Sitting, Cuff Size: Large)   Pulse 77   Temp 98.2 °F (36.8 °C) (Tympanic)   Ht 5' 10\" (1.778 m)   Wt 95.6 kg (210 lb 12.8 oz)   SpO2 97%   BMI 30.25 kg/m²      Physical Exam  Vitals and nursing note reviewed.   Constitutional:       General: He is not in acute distress.     Appearance: Normal appearance. He is well-developed.   HENT:      Head: Normocephalic and atraumatic.   Eyes:      Conjunctiva/sclera: Conjunctivae normal.   Cardiovascular:      Rate and Rhythm: Normal rate and regular rhythm.      Pulses: Normal pulses.      Heart sounds: Normal heart sounds. No murmur heard.  Pulmonary:      Effort: Pulmonary effort is normal. No respiratory distress.      Breath sounds: Wheezing present.      Comments: Few end expiratory wheezes  Abdominal:      Palpations: Abdomen is soft.      Tenderness: There is no abdominal tenderness.   Musculoskeletal:         General: Tenderness present. No swelling.      Cervical back: Neck supple.      Comments: Decreased range of motion lumbar spine   Skin:     General: Skin is warm and dry.      Capillary Refill: Capillary refill takes less than 2 seconds.   Neurological:      General: No focal deficit present.      Mental Status: He is alert and oriented to person, place, and time.   Psychiatric:         Mood and Affect: Mood normal.         "

## 2024-12-23 NOTE — TELEPHONE ENCOUNTER
"Vini reports cough and nasal drainage since 12/13/2024. He reports he has been using his inhaler and taking Vit C, symptoms persist. He requests office visit with PCP today. Office visit scheduled for 2:40 pm with PCP.      Reason for Disposition   Patient wants to be seen    Answer Assessment - Initial Assessment Questions  1. ONSET: \"When did the cough begin?\"       About 10 days ago, 12/13/2024    2. SEVERITY: \"How bad is the cough today?\"       Moderate    3. SPUTUM: \"Describe the color of your sputum\" (e.g., none, dry cough; clear, white, yellow, green)      Dry    4. HEMOPTYSIS: \"Are you coughing up any blood?\" If Yes, ask: \"How much?\" (e.g., flecks, streaks, tablespoons, etc.)      Denies    5. DIFFICULTY BREATHING: \"Are you having difficulty breathing?\" If Yes, ask: \"How bad is it?\" (e.g., mild, moderate, severe)       Denies    6. FEVER: \"Do you have a fever?\" If Yes, ask: \"What is your temperature, how was it measured, and when did it start?\"      Denies    7. OTHER SYMPTOMS: \"Do you have any other symptoms?\" (e.g., runny nose, wheezing, chest pain)        Nasal congestion and drainage    Protocols used: Cough-Adult-OH    "

## 2024-12-24 ENCOUNTER — APPOINTMENT (OUTPATIENT)
Dept: LAB | Facility: HOSPITAL | Age: 64
End: 2024-12-24
Payer: COMMERCIAL

## 2024-12-24 DIAGNOSIS — I26.99 BILATERAL PULMONARY EMBOLISM (HCC): ICD-10-CM

## 2024-12-24 PROCEDURE — 85670 THROMBIN TIME PLASMA: CPT

## 2024-12-24 PROCEDURE — 85613 RUSSELL VIPER VENOM DILUTED: CPT

## 2024-12-24 PROCEDURE — 85732 THROMBOPLASTIN TIME PARTIAL: CPT

## 2024-12-24 PROCEDURE — 36415 COLL VENOUS BLD VENIPUNCTURE: CPT

## 2024-12-24 PROCEDURE — 85705 THROMBOPLASTIN INHIBITION: CPT

## 2024-12-27 LAB
APTT SCREEN TO CONFIRM RATIO: 1.19 RATIO (ref 0–1.34)
CONFIRM APTT/NORMAL: 41 SEC (ref 0–47.6)
LA PPP-IMP: NORMAL
SCREEN APTT: 33.5 SEC (ref 0–43.5)
SCREEN DRVVT: 40.3 SEC (ref 0–47)
THROMBIN TIME: 16.2 SEC (ref 0–23)

## 2025-01-09 ENCOUNTER — OFFICE VISIT (OUTPATIENT)
Dept: PULMONOLOGY | Facility: CLINIC | Age: 65
End: 2025-01-09
Payer: COMMERCIAL

## 2025-01-09 VITALS
BODY MASS INDEX: 30.35 KG/M2 | DIASTOLIC BLOOD PRESSURE: 80 MMHG | OXYGEN SATURATION: 98 % | WEIGHT: 212 LBS | SYSTOLIC BLOOD PRESSURE: 144 MMHG | HEIGHT: 70 IN | HEART RATE: 95 BPM | TEMPERATURE: 98.4 F

## 2025-01-09 DIAGNOSIS — I26.99 BILATERAL PULMONARY EMBOLISM (HCC): Primary | ICD-10-CM

## 2025-01-09 PROCEDURE — 99213 OFFICE O/P EST LOW 20 MIN: CPT

## 2025-01-09 NOTE — PROGRESS NOTES
Pulmonary Follow Up Note  Vini Ybarra 64 y.o. male MRN: [id number]  1/9/2025    Assessment/Plan:    Bilateral pulmonary embolism (HCC)  -Patient is s/p 6 months on Eliquis 5 mg BID for unprovoked bilateral PE  -His repeat lupus anticoagulant was negative  -He is not experiencing any shortness of breath or chest pain.  SpO2 98% on room air    Plan:  -Discussed with patient, given that he has had 2 VTE events now, recommend indefinite anticoagulation with low-dose Eliquis 2.5 mg twice daily.  He is okay with this plan for now, but would like second opinion from hematology to see if he could ever come off of this.  Number provided to patient to schedule visit with hematology.  -He will follow-up in the office in 6 months or sooner if needed          Diagnoses and all orders for this visit:    Bilateral pulmonary embolism (HCC)  -     apixaban (ELIQUIS) 2.5 mg; Take 1 tablet (2.5 mg total) by mouth 2 (two) times a day        Return in about 6 months (around 7/9/2025).    History of Present Illness     Chief Complaint: No chief complaint on file.      Patient ID: Vini is a 64 y.o. y.o. male has a past medical history of Arthritis, Benign prostatic hyperplasia with incomplete bladder emptying (02/28/2024), Bilateral pulmonary embolism (HCC) (06/23/2024), Chronic pain disorder, COVID (2021), GERD (gastroesophageal reflux disease), and Spinal cord dysplasia (HCC).      1/9/2025  HPI: Vini Ybarra is a 64 y.o. male with a PMH of GERD, osteoarthritis, chronic opioid dependence, myelomalacia of the cervical cord s/p cervical fusion 10 years ago, and remote history of DVT 10 years ago.  He is here today for a follow-up visit for pulmonary embolism.  He was hospitalized in June for acute bilateral pulmonary emboli as well as left lower lobe pneumonia and pleural effusion.  Did not require thoracentesis and repeat imaging in August showed resolution of prior infiltrate and pleural effusion.  He has been maintained on Eliquis for  a minimum of 6 months, however due to 2 separate VTE's, indefinite anticoagulation recommended.  He has not seen hematology, however was referred.  He had a positive lupus anticoagulant on admission but negative on recent repeat.  He was sick recently with sinobronchitis and completed course of antibiotics with Ceftin, and prednisone taper.  Symptoms are improving, still with residual cough.  He otherwise denies any issues with his breathing.  He did have a random episode of left-sided lower chest wall pain which resolved.  No recent recurrences.        Review of Systems   Constitutional:  Negative for activity change, chills, fever and unexpected weight change.   HENT:  Negative for congestion, postnasal drip, rhinorrhea, sore throat and trouble swallowing.    Respiratory:  Positive for cough. Negative for chest tightness, shortness of breath and wheezing.    Cardiovascular:  Negative for chest pain, palpitations and leg swelling.   Allergic/Immunologic: Negative.        Historical Information   Past Medical History:   Diagnosis Date    Arthritis     Benign prostatic hyperplasia with incomplete bladder emptying 02/28/2024    Bilateral pulmonary embolism (HCC) 06/23/2024    Chronic pain disorder     COVID 2021    GERD (gastroesophageal reflux disease)     Spinal cord dysplasia (HCC)      Past Surgical History:   Procedure Laterality Date    CERVICAL FUSION      EGD      HERNIA REPAIR Left     NASAL SEPTOPLASTY W/ TURBINOPLASTY  08/22/2003    CO BX PROSTATE STRTCTC SATURATION SAMPLING IMG GID N/A 11/22/2023    Procedure: TRANSPERINEAL ULTRASOUND GUIDED BIOPSY PROSTATE;  Surgeon: Luis Antonio Rodrigues MD;  Location: [facility];  Service: Urology    CO TRURL ELECTROSURG RESCJ PROSTATE BLEED COMPLETE N/A 2/28/2024    Procedure: TRANSURETHRAL RESECTION OF PROSTATE (TURP);  Surgeon: Luis Antonio Rodrigues MD;  Location: [facility];  Service: Urology    TOOTH EXTRACTION       Family History   Problem Relation Age of Onset    Cancer Father      Lung cancer Brother     Cancer Son        Smoking history: He reports that he has quit smoking. His smoking use included cigarettes. He started smoking about 51 years ago. He has a 20 pack-year smoking history. He has been exposed to tobacco smoke. He has never used smokeless tobacco.    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 History   Administered Date(s) Administered    INFLUENZA 10/26/2020    Pneumococcal Conjugate Vaccine 20-valent (Pcv20), Polysace 10/08/2024    Tdap 05/06/2024       Meds/Allergies     Current Outpatient Medications:     albuterol (Ventolin HFA) 90 mcg/act inhaler, Inhale 2 puffs every 6 (six) hours as needed for wheezing, Disp: 18 g, Rfl: 5    apixaban (ELIQUIS) 2.5 mg, Take 1 tablet (2.5 mg total) by mouth 2 (two) times a day, Disp: 60 tablet, Rfl: 5    cholecalciferol (VITAMIN D3) 1,000 units tablet, Take 1,000 Units by mouth daily, Disp: , Rfl:     gabapentin (NEURONTIN) 300 mg capsule, Take 300 mg by mouth 3 (three) times a day as needed, Disp: , Rfl:     ketoconazole (NIZORAL) 2 % cream, apply to affected area twice a day ON FACE TILL CLEAR FOR FLARES, Disp: , Rfl:     omeprazole (PriLOSEC) 40 MG capsule, Take 1 capsule (40 mg total) by mouth daily, Disp: 90 capsule, Rfl: 1    oxyCODONE (OXY-IR) 5 MG capsule, Take 1 capsule (5 mg total) by mouth 2 (two) times a day as needed for moderate pain Max Daily Amount: 10 mg, Disp: 60 capsule, Rfl: 0    predniSONE 10 mg tablet, 4 pills for 2 days, then 3 pills for 2 days, then 2 pills for 2 days then 1 pill for 2 days., Disp: 20 tablet, Rfl: 0    promethazine-dextromethorphan (PHENERGAN-DM) 6.25-15 mg/5 mL oral syrup, Take 5 mL by mouth 4 (four) times a day as needed for cough, Disp: 118 mL, Rfl: 0    rosuvastatin (CRESTOR) 5 mg tablet, Take 1 tablet (5 mg total) by mouth daily, Disp: 90 tablet, Rfl: 3    Zinc 50 MG CAPS, Take by mouth, Disp: , Rfl:     Allergies:   Allergies   Allergen Reactions    Acetaminophen Other (See Comments)     Other  "reaction(s): TONGUE LIPS NOSE TINGLING SENSAT           Vitals:  Vitals:    01/09/25 0901 01/09/25 0905   BP: 144/80    BP Location: Left arm    Patient Position: Sitting    Cuff Size: Adult    Pulse: 95    Temp: 98.4 °F (36.9 °C)    TempSrc: Temporal    SpO2: 98% 98%   Weight: 96.2 kg (212 lb)    Height: 5' 10\" (1.778 m)      Oxygen Therapy  SpO2: 98 %  Oxygen Therapy: None (Room air)  .  Wt Readings from Last 3 Encounters:   01/09/25 96.2 kg (212 lb)   12/23/24 95.6 kg (210 lb 12.8 oz)   11/14/24 98 kg (216 lb)     Body mass index is 30.42 kg/m².    Physical Exam  Vitals and nursing note reviewed.   Constitutional:       General: He is not in acute distress.     Appearance: Normal appearance. He is well-developed.   Cardiovascular:      Rate and Rhythm: Normal rate and regular rhythm.      Heart sounds: Normal heart sounds. No murmur heard.  Pulmonary:      Effort: Pulmonary effort is normal. No respiratory distress.      Breath sounds: Normal breath sounds. No decreased breath sounds, wheezing, rhonchi or rales.   Musculoskeletal:         General: No swelling.      Right lower leg: No edema.      Left lower leg: No edema.   Psychiatric:         Mood and Affect: Mood and affect normal.         Behavior: Behavior normal. Behavior is cooperative.           Labs: I have personally reviewed pertinent lab results.  Lab Results   Component Value Date    WBC 7.57 06/25/2024    HGB 13.9 06/25/2024    HCT 41.7 06/25/2024    MCV 91 06/25/2024     06/25/2024     Lab Results   Component Value Date    GLUCOSE 89 06/07/2014    CALCIUM 10.0 06/25/2024     06/07/2014    K 4.1 06/25/2024    CO2 27 06/25/2024     06/25/2024    BUN 14 06/25/2024    CREATININE 1.09 06/25/2024     No results found for: \"IGE\"  Lab Results   Component Value Date    ALT 19 06/24/2024    AST 16 06/24/2024    ALKPHOS 63 06/24/2024    BILITOT 0.3 06/07/2014       Studies:    Imaging and other studies: I have personally reviewed pertinent " reports and I have personally reviewed pertinent films in PACS     CT Chest 8/26/24  Resolution of the previously seen LLL consolidation and left pleural effusion.    CTA Chest 6/23/24  1. Small peripheral bilateral pulmonary arterial filling defects consistent with acute pulmonary emboli. Somewhat elevated RV/LV ratio at 1.03.  2. Left bibasilar atelectasis. More focal dependent left lower lobe opacification could also represent pneumonia. Small loculated left pleural effusion.  3. Mildly enlarged mediastinal and left hilar lymph nodes, likely reactive in nature.    EKG, Pathology, and Other Studies: I have personally reviewed pertinent reports.        ECHO 6/24/24    Left Ventricle: Left ventricular cavity size is normal. Wall thickness is normal. The left ventricular ejection fraction is 55%. Systolic function is normal. Wall motion is normal. Diastolic function is normal.    Left Atrium: The atrium is mildly dilated.    Aortic Valve: There is aortic valve sclerosis.    Mitral Valve: There is mild regurgitation.    Tricuspid Valve: There is mild regurgitation.    Pulmonary Artery: The estimated pulmonary artery systolic pressure is 28.0 mmHg.

## 2025-01-09 NOTE — ASSESSMENT & PLAN NOTE
-Patient is s/p 6 months on Eliquis 5 mg BID for unprovoked bilateral PE  -His repeat lupus anticoagulant was negative  -He is not experiencing any shortness of breath or chest pain.  SpO2 98% on room air    Plan:  -Discussed with patient, given that he has had 2 VTE events now, recommend indefinite anticoagulation with low-dose Eliquis 2.5 mg twice daily.  He is okay with this plan for now, but would like second opinion from hematology to see if he could ever come off of this.  Number provided to patient to schedule visit with hematology.  -He will follow-up in the office in 6 months or sooner if needed

## 2025-01-10 ENCOUNTER — TELEPHONE (OUTPATIENT)
Age: 65
End: 2025-01-10

## 2025-01-10 DIAGNOSIS — J32.9 SINOBRONCHITIS: ICD-10-CM

## 2025-01-10 DIAGNOSIS — J40 SINOBRONCHITIS: ICD-10-CM

## 2025-01-10 RX ORDER — DOXYCYCLINE HYCLATE 100 MG
100 TABLET ORAL 2 TIMES DAILY
Qty: 14 TABLET | Refills: 0 | Status: SHIPPED | OUTPATIENT
Start: 2025-01-10 | End: 2025-01-17

## 2025-01-10 RX ORDER — DEXTROMETHORPHAN HYDROBROMIDE AND PROMETHAZINE HYDROCHLORIDE 15; 6.25 MG/5ML; MG/5ML
5 SYRUP ORAL 4 TIMES DAILY PRN
Qty: 118 ML | Refills: 0 | Status: SHIPPED | OUTPATIENT
Start: 2025-01-10

## 2025-01-10 NOTE — TELEPHONE ENCOUNTER
Notify patient, called in another round of cough syrup form and doxycycline 100 twice a day for 7 days.  If this cough persists he should be seen.  Also, he should test for COVID if he has not already.

## 2025-01-10 NOTE — TELEPHONE ENCOUNTER
Patient was seen on 12/23 for a cough and congestion. Patient was prescribed medication that he says has helped but he still has a lingering dry cough, possible sinus infection, and some post nasal drip. Patient is asking if medication can be sent to the pharmacy for him. Please advise.       Pharmacy:[id number]  [place] [address]

## 2025-01-17 ENCOUNTER — OFFICE VISIT (OUTPATIENT)
Age: 65
End: 2025-01-17
Payer: COMMERCIAL

## 2025-01-17 VITALS
WEIGHT: 207 LBS | BODY MASS INDEX: 29.63 KG/M2 | HEART RATE: 64 BPM | OXYGEN SATURATION: 98 % | TEMPERATURE: 98.1 F | HEIGHT: 70 IN

## 2025-01-17 DIAGNOSIS — Z12.11 SCREENING FOR COLON CANCER: ICD-10-CM

## 2025-01-17 DIAGNOSIS — K21.9 GASTROESOPHAGEAL REFLUX DISEASE, UNSPECIFIED WHETHER ESOPHAGITIS PRESENT: Primary | ICD-10-CM

## 2025-01-17 DIAGNOSIS — R13.19 ESOPHAGEAL DYSPHAGIA: ICD-10-CM

## 2025-01-17 DIAGNOSIS — R10.13 EPIGASTRIC PAIN: ICD-10-CM

## 2025-01-17 PROCEDURE — 99214 OFFICE O/P EST MOD 30 MIN: CPT | Performed by: NURSE PRACTITIONER

## 2025-01-17 NOTE — PATIENT INSTRUCTIONS
Decrease Omeprazole to 20 mg daily.   Continue to avoid acidic or trigger foods as much as possible.   Continue to drink at least 32-64 oz of water daily.   Continue to watch for red flag symptoms.   Schedule a f/u OV in 6 months.     We did review GI red flag symptoms, including, but not limited to: chronic nausea, vomiting, diarrhea, chills, fever, and unintentional weight loss and should call or contact our office with any changes or concerns. I reviewed with the patient that if they notice any blood while vomiting or in their stool they should contact or office or go to the nearest emergency room for immediate evaluation. The patient was agreeable and verbalized an understanding.

## 2025-01-17 NOTE — ASSESSMENT & PLAN NOTE
Stable    At this point in time we will decrease the omeprazole down to 20 mg daily for the next 6 months and see how he does.  However, I did discuss with the patient that if he notices a difference in his symptoms with a decrease in the omeprazole, he is to contact our office and we can increase it back up to the 40 mg a day dosing.  The patient was agreeable and verbalized an understanding.    Continue to avoid acidic or trigger foods much as possible.  Continue to try drink at least 32 to 64 ounces of water daily.  Orders:    omeprazole (PriLOSEC) 20 mg delayed release capsule; Take 1 capsule (20 mg total) by mouth daily

## 2025-01-17 NOTE — PROGRESS NOTES
Name: Vini Ybarra      : 1960      MRN: [id number]  Encounter Provider: LILIANA Patrick  Encounter Date: 2025   Encounter department: [facility]  :  Assessment & Plan  Gastroesophageal reflux disease, unspecified whether esophagitis present  Stable    At this point in time we will decrease the omeprazole down to 20 mg daily for the next 6 months and see how he does.  However, I did discuss with the patient that if he notices a difference in his symptoms with a decrease in the omeprazole, he is to contact our office and we can increase it back up to the 40 mg a day dosing.  The patient was agreeable and verbalized an understanding.    Continue to avoid acidic or trigger foods much as possible.  Continue to try drink at least 32 to 64 ounces of water daily.  Orders:    omeprazole (PriLOSEC) 20 mg delayed release capsule; Take 1 capsule (20 mg total) by mouth daily    Esophageal dysphagia  Orders:    omeprazole (PriLOSEC) 20 mg delayed release capsule; Take 1 capsule (20 mg total) by mouth daily    Epigastric pain  Orders:    omeprazole (PriLOSEC) 20 mg delayed release capsule; Take 1 capsule (20 mg total) by mouth daily    Screening for colon cancer  We will hold off on any repeat colonoscopies until the recommended surveillance date unless the patient would start to develop red flag symptoms in the future.  The patient was agreeable and verbalized an understanding.  DUE:     Reviewed GI red flag symptoms with patient today.         The patient is to schedule a follow up office visit in 6 months, but understands to call or contact our offices with any issues before then or as needed.     History of Present Illness   HPI  Vini Ybarra is a 64 y.o. male who presents today for a follow-up office visit secondary to his underlying GERD symptoms, esophageal dysphagia, and epigastric pain.  The patient reports that since his last office visit his symptoms have  "continued to remain completely resolved and manageable with his current medication regimen and is ready to discuss decreasing the omeprazole to the 20 mg a day dose as previously planned.    The patient currently denies any reflux, nausea, dysphagia, vomiting, decreased appetite, unplanned weight loss, or abdominal pain. Water Intake: Adequate amounts per day.    The patient currently reports that they have a BM daily and reports that it is relieving, without any consistent diarrhea, nocturnal BMs, constipation, straining, melena or bloody stools. Last BM: This morning. Flatus: Yes.    Meds: Omeprazole 40 mg daily.  Daily NSAID Use: Denied    Tobacco: Denied  ETOH: Socially  Marijuana: Denied  Illicit Drug Use: Denied    Imaging: (None):     Endoscopy History: EGD: (5/1/24): Evidence of gastritis and possible erosive esophagitis. Path: Normal.     COLONOSCOPY: (1-2 years ago): Dr Sharma or Dr Bruno. Normal according to the patient and is to have repeat in 5 years.       DUE: 2026    History obtained from: patient    Review of Systems       Objective   Pulse 64   Temp 98.1 °F (36.7 °C) (Temporal)   Ht 5' 10\" (1.778 m)   Wt 93.9 kg (207 lb)   SpO2 98%   BMI 29.70 kg/m²      Physical Exam  Abdomen is soft, nondistended, nontender, with hypoactive bowel sounds x 4.  No edema noted of the b/l lower extremities upon exam today. Skin is non-icteric.       "

## 2025-01-17 NOTE — ASSESSMENT & PLAN NOTE
Orders:    omeprazole (PriLOSEC) 20 mg delayed release capsule; Take 1 capsule (20 mg total) by mouth daily

## 2025-01-27 ENCOUNTER — TELEPHONE (OUTPATIENT)
Dept: UROLOGY | Facility: CLINIC | Age: 65
End: 2025-01-27

## 2025-01-27 NOTE — TELEPHONE ENCOUNTER
Called and spoke with patient. He was scheduled for a nurse visit tomorrow at 10:30 am in the [place] office. Patient is aware to present to the office with a comfortably full bladder to provide a urine sample, then plan to perform bladder scan after urination to ensure adequate emptying. Of note, patient currently taking prn cough syrup. Will evaluate further at tomorrows appointment.

## 2025-01-27 NOTE — TELEPHONE ENCOUNTER
Patient has history of elevated PVRs in the past. It has been difficult to assess his emptying because he drinks large amounts of fluids and PVR testing in the past has been performed several minutes after he voids. I would recommend patient be schedule for nurse visit for PVR testing to rule out possible retention with emphasis that patient should void in the office no prior to arrival. I also agree with urine testing as well.

## 2025-01-27 NOTE — TELEPHONE ENCOUNTER
Pt called my direct line, states he has been having bladder pain for a little while. Feels like when he had de jesus in after 2/28/24 TURP and worried he has something else going on now. Pt concerned it could be scar tissue or bladder cancer. Pain scale is 1-2. Pt did explain he has been battling a cold and still coughing a lot. Advised pt could be stomach muscles from coughing or, possible uti. Pt denies any n/f/v, burning with urination or frequency. Provider to be notified

## 2025-01-28 ENCOUNTER — PROCEDURE VISIT (OUTPATIENT)
Dept: UROLOGY | Facility: CLINIC | Age: 65
End: 2025-01-28
Payer: COMMERCIAL

## 2025-01-28 VITALS
HEIGHT: 70 IN | RESPIRATION RATE: 16 BRPM | BODY MASS INDEX: 30.35 KG/M2 | WEIGHT: 212 LBS | TEMPERATURE: 97.8 F | DIASTOLIC BLOOD PRESSURE: 74 MMHG | SYSTOLIC BLOOD PRESSURE: 138 MMHG | OXYGEN SATURATION: 99 % | HEART RATE: 64 BPM

## 2025-01-28 DIAGNOSIS — N13.8 BPH WITH OBSTRUCTION/LOWER URINARY TRACT SYMPTOMS: ICD-10-CM

## 2025-01-28 DIAGNOSIS — N40.1 BPH WITH OBSTRUCTION/LOWER URINARY TRACT SYMPTOMS: ICD-10-CM

## 2025-01-28 DIAGNOSIS — R33.9 INCOMPLETE BLADDER EMPTYING: Primary | ICD-10-CM

## 2025-01-28 LAB
POST-VOID RESIDUAL VOLUME, ML POC: 115 ML
SL AMB  POCT GLUCOSE, UA: NORMAL
SL AMB LEUKOCYTE ESTERASE,UA: NORMAL
SL AMB POCT BILIRUBIN,UA: NORMAL
SL AMB POCT BLOOD,UA: NORMAL
SL AMB POCT CLARITY,UA: CLEAR
SL AMB POCT COLOR,UA: NORMAL
SL AMB POCT KETONES,UA: NORMAL
SL AMB POCT NITRITE,UA: NORMAL
SL AMB POCT PH,UA: 7
SL AMB POCT SPECIFIC GRAVITY,UA: 1.01
SL AMB POCT URINE PROTEIN: NORMAL
SL AMB POCT UROBILINOGEN: NORMAL

## 2025-01-28 PROCEDURE — 51798 US URINE CAPACITY MEASURE: CPT

## 2025-01-28 PROCEDURE — 81002 URINALYSIS NONAUTO W/O SCOPE: CPT

## 2025-01-28 NOTE — PROGRESS NOTES
I supervised the Advanced Practitioner on . I reviewed the Advanced Practitioner note and agree.    Luis Antonio Rodrigues MD 01/28/25

## 2025-01-28 NOTE — PROGRESS NOTES
"1/28/2025  Vini Ybarra is a 64 y.o. male  [id number]    Diagnosis: Incomplete bladder emptying, BPH with LUTS    Chief Complaint    Urine testing/PVR         Patient contacted the office yesterday reporting intermittent bladder pain with coughing. He was scheduled for a nurse visit today for a PVR to ensure adequate bladder emptying and to obtain urine testing.     Plan:    Patient to follow up as scheduled.     He is aware to contact the office between now and next scheduled follow up with any questions or concerns.     Assessment:      Vitals:    01/28/25 1035   BP: 138/74   Pulse: 64   Resp: 16   Temp: 97.8 °F (36.6 °C)   SpO2: 99%   Weight: 96.2 kg (212 lb)   Height: 5' 10\" (1.778 m)         Patient voided 100 mL in the office.  Post void residual measured via hand held bladder scanner to be 115 mL. Patient denies abdominal pain or pressure. He reports symptoms have pretty much subsided since stopping cough medicine 2 days ago. Urine testing is completely negative.       Recent Results (from the past 6 hours)   POCT urine dip    Collection Time: 01/28/25 10:47 AM   Result Value Ref Range    LEUKOCYTE ESTERASE,UA -     NITRITE,UA -     SL AMB POCT UROBILINOGEN -     POCT URINE PROTEIN -      PH,UA 7.0     BLOOD,UA -     SPECIFIC GRAVITY,UA 1.010     KETONES,UA -     BILIRUBIN,UA -     GLUCOSE, UA -      COLOR,UA pale yellow     CLARITY,UA clear    POCT Measure PVR    Collection Time: 01/28/25 10:47 AM   Result Value Ref Range    POST-VOID RESIDUAL VOLUME, ML  mL           Lazara De Oliveira RN     "

## 2025-02-04 ENCOUNTER — HOSPITAL ENCOUNTER (OUTPATIENT)
Dept: RADIOLOGY | Facility: HOSPITAL | Age: 65
Discharge: HOME | End: 2025-02-04
Payer: COMMERCIAL

## 2025-02-04 ENCOUNTER — OFFICE VISIT (OUTPATIENT)
Dept: FAMILY MEDICINE CLINIC | Facility: CLINIC | Age: 65
End: 2025-02-04
Payer: COMMERCIAL

## 2025-02-04 ENCOUNTER — RESULTS FOLLOW-UP (OUTPATIENT)
Dept: FAMILY MEDICINE CLINIC | Facility: CLINIC | Age: 65
End: 2025-02-04

## 2025-02-04 VITALS
HEIGHT: 70 IN | BODY MASS INDEX: 30.06 KG/M2 | WEIGHT: 210 LBS | OXYGEN SATURATION: 96 % | HEART RATE: 87 BPM | DIASTOLIC BLOOD PRESSURE: 70 MMHG | TEMPERATURE: 99.8 F | SYSTOLIC BLOOD PRESSURE: 136 MMHG

## 2025-02-04 DIAGNOSIS — J06.9 UPPER RESPIRATORY TRACT INFECTION, UNSPECIFIED TYPE: ICD-10-CM

## 2025-02-04 DIAGNOSIS — J32.9 SINOBRONCHITIS: ICD-10-CM

## 2025-02-04 DIAGNOSIS — J40 SINOBRONCHITIS: ICD-10-CM

## 2025-02-04 DIAGNOSIS — J06.9 UPPER RESPIRATORY TRACT INFECTION, UNSPECIFIED TYPE: Primary | ICD-10-CM

## 2025-02-04 LAB
SARS-COV-2 AG UPPER RESP QL IA: NEGATIVE
SL AMB POCT RAPID FLU A: NORMAL
SL AMB POCT RAPID FLU B: NORMAL
VALID CONTROL: NORMAL

## 2025-02-04 PROCEDURE — 71046 X-RAY EXAM CHEST 2 VIEWS: CPT

## 2025-02-04 PROCEDURE — 96372 THER/PROPH/DIAG INJ SC/IM: CPT | Performed by: STUDENT IN AN ORGANIZED HEALTH CARE EDUCATION/TRAINING PROGRAM

## 2025-02-04 PROCEDURE — 87811 SARS-COV-2 COVID19 W/OPTIC: CPT | Performed by: STUDENT IN AN ORGANIZED HEALTH CARE EDUCATION/TRAINING PROGRAM

## 2025-02-04 PROCEDURE — 99213 OFFICE O/P EST LOW 20 MIN: CPT | Performed by: STUDENT IN AN ORGANIZED HEALTH CARE EDUCATION/TRAINING PROGRAM

## 2025-02-04 PROCEDURE — 87804 INFLUENZA ASSAY W/OPTIC: CPT | Performed by: STUDENT IN AN ORGANIZED HEALTH CARE EDUCATION/TRAINING PROGRAM

## 2025-02-04 RX ORDER — DEXAMETHASONE SODIUM PHOSPHATE 10 MG/ML
10 INJECTION INTRAMUSCULAR; INTRAVENOUS ONCE
Status: COMPLETED | OUTPATIENT
Start: 2025-02-04 | End: 2025-02-04

## 2025-02-04 RX ORDER — DEXTROMETHORPHAN HYDROBROMIDE AND PROMETHAZINE HYDROCHLORIDE 15; 6.25 MG/5ML; MG/5ML
5 SYRUP ORAL 4 TIMES DAILY PRN
Qty: 118 ML | Refills: 0 | Status: SHIPPED | OUTPATIENT
Start: 2025-02-04

## 2025-02-04 RX ADMIN — DEXAMETHASONE SODIUM PHOSPHATE 10 MG: 10 INJECTION INTRAMUSCULAR; INTRAVENOUS at 12:40

## 2025-02-04 NOTE — PROGRESS NOTES
Name: Vini Ybarra      : 1960      MRN: [id number]  Encounter Provider: Perez Stevenson MD  Encounter Date: 2025   Encounter department: [facility]  :  Assessment & Plan  Upper respiratory tract infection, unspecified type    Orders:    POCT rapid flu A and B    POCT Rapid Covid Ag    XR chest pa and lateral; Future    dexamethasone (DECADRON) injection 10 mg    At this point patient has gone through 2 rounds of antibiotic and his symptoms do not seem consistent with sinusitis.  I am concerned that there may be other etiology concerning his cough.  He reports that this is his only concern.  Will start with a chest x-ray and then will likely need initiation of chronic cough pathway.  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  Would have a low threshold to rescan.  Will follow-up after x-ray       History of Present Illness   HPI      Reports on  seen and diagnosed with sinusitis and started on cefuroximine and prednisone.  States improvement and then on January 1/10 called office with worsening symptoms and was given doxy. Reports he was getting better and then last night he noticed a worsening cough.   No nasal drainage, non productive cough, no fevers, no chills. Reports no sick contacts. No fevers at home. No nasal congestion.       Review of Systems   Constitutional:  Negative for activity change, appetite change, chills, fatigue and fever.   HENT:  Negative for congestion, dental problem, drooling, ear discharge, ear pain, facial swelling, postnasal drip, rhinorrhea and sinus pain.    Eyes:  Negative for photophobia, pain, discharge and itching.   Respiratory:  Positive for cough. Negative for apnea, chest tightness and shortness of breath.    Cardiovascular:  Negative for chest pain and leg swelling.   Gastrointestinal:  Negative for abdominal distention, abdominal pain, anal bleeding, constipation, diarrhea and  "nausea.   Endocrine: Negative for cold intolerance, heat intolerance and polydipsia.   Genitourinary:  Negative for difficulty urinating.   Musculoskeletal:  Negative for arthralgias, gait problem, joint swelling and myalgias.   Skin:  Negative for color change and pallor.   Allergic/Immunologic: Negative for immunocompromised state.   Neurological:  Negative for dizziness, seizures, facial asymmetry, weakness, light-headedness, numbness and headaches.   Psychiatric/Behavioral:  Negative for agitation, behavioral problems, confusion, decreased concentration and dysphoric mood.    All other systems reviewed and are negative.      Objective   /70 (BP Location: Left arm, Patient Position: Sitting, Cuff Size: Adult)   Pulse 87   Temp 99.8 °F (37.7 °C) (Tympanic)   Ht 5' 10\" (1.778 m)   Wt 95.3 kg (210 lb)   SpO2 96%   BMI 30.13 kg/m²      Physical Exam  Constitutional:       Appearance: He is well-developed.   HENT:      Head: Normocephalic.   Eyes:      Pupils: Pupils are equal, round, and reactive to light.   Cardiovascular:      Rate and Rhythm: Normal rate and regular rhythm.   Pulmonary:      Effort: Pulmonary effort is normal.      Breath sounds: Normal breath sounds.   Abdominal:      General: Bowel sounds are normal.      Palpations: Abdomen is soft.   Musculoskeletal:         General: Normal range of motion.      Cervical back: Normal range of motion and neck supple.   Skin:     General: Skin is warm.         "

## 2025-02-05 ENCOUNTER — TELEPHONE (OUTPATIENT)
Age: 65
End: 2025-02-05

## 2025-02-05 NOTE — TELEPHONE ENCOUNTER
Spoke with pt and relayed Aleshia's message. She reviewed the CXR. No acute infection, but incidental finding of elevated left hemidiaphragm. Hhappy to see him in the office next week to discuss. She reviewed his PCP's note from yesterday, he did not feel his symptoms were consistent with a sinus infection. IWould recommend continued supportive treatment for his symptoms and follow-up with me next week.

## 2025-02-05 NOTE — TELEPHONE ENCOUNTER
Pt states that he is having an issue with cough. He states prior to seeing pulm he was treated by PCP and was given 2 ABX and other medications that helped. He states that he woke up yesterday with a big cough, he was seen by PCP yesterday and had him do a CXR that was normal. They recommended that he follow up with pulm.      He asks that Aleshia review his CXR to see if there are any recommendations on tests or treatments that could help him. He believes he also has a sinus infection. He's coughing up some phlegm. Has no temp. P 78, Osat 98%RA. States some hoarseness, some wheezing, no shortness of breath. No chest pain or tightness.     Pt was placed on schedule for urgent follow up on 2/13 with Britt. States if she feels not needed to let him know he would just like advice as well and possible medication. Can send script to [facility] on file.     Had covid test yesterday that was negative.

## 2025-02-06 ENCOUNTER — APPOINTMENT (EMERGENCY)
Dept: CT IMAGING | Facility: HOSPITAL | Age: 65
End: 2025-02-06
Payer: COMMERCIAL

## 2025-02-06 ENCOUNTER — HOSPITAL ENCOUNTER (EMERGENCY)
Facility: HOSPITAL | Age: 65
Discharge: HOME | End: 2025-02-06
Attending: EMERGENCY MEDICINE | Admitting: EMERGENCY MEDICINE
Payer: COMMERCIAL

## 2025-02-06 ENCOUNTER — NURSE TRIAGE (OUTPATIENT)
Age: 65
End: 2025-02-06

## 2025-02-06 VITALS
OXYGEN SATURATION: 97 % | WEIGHT: 203.04 LBS | SYSTOLIC BLOOD PRESSURE: 131 MMHG | HEART RATE: 97 BPM | BODY MASS INDEX: 29.13 KG/M2 | RESPIRATION RATE: 18 BRPM | TEMPERATURE: 99.8 F | DIASTOLIC BLOOD PRESSURE: 76 MMHG

## 2025-02-06 DIAGNOSIS — J10.1 INFLUENZA A: Primary | ICD-10-CM

## 2025-02-06 LAB
ANION GAP SERPL CALCULATED.3IONS-SCNC: 9 MMOL/L (ref 4–13)
BASOPHILS # BLD AUTO: 0.03 THOUSANDS/ΜL (ref 0–0.1)
BASOPHILS NFR BLD AUTO: 0 % (ref 0–1)
BUN SERPL-MCNC: 20 MG/DL (ref 5–25)
CALCIUM SERPL-MCNC: 9.1 MG/DL (ref 8.4–10.2)
CARDIAC TROPONIN I PNL SERPL HS: 9 NG/L (ref ?–50)
CHLORIDE SERPL-SCNC: 96 MMOL/L (ref 96–108)
CO2 SERPL-SCNC: 26 MMOL/L (ref 21–32)
CREAT SERPL-MCNC: 1.35 MG/DL (ref 0.6–1.3)
EOSINOPHIL # BLD AUTO: 0 THOUSAND/ΜL (ref 0–0.61)
EOSINOPHIL NFR BLD AUTO: 0 % (ref 0–6)
ERYTHROCYTE [DISTWIDTH] IN BLOOD BY AUTOMATED COUNT: 13 % (ref 11.6–15.1)
FLUAV AG UPPER RESP QL IA.RAPID: POSITIVE
FLUBV AG UPPER RESP QL IA.RAPID: NEGATIVE
GFR SERPL CREATININE-BSD FRML MDRD: 54 ML/MIN/1.73SQ M
GLUCOSE SERPL-MCNC: 97 MG/DL (ref 65–140)
HCT VFR BLD AUTO: 42.3 % (ref 36.5–49.3)
HGB BLD-MCNC: 14 G/DL (ref 12–17)
IMM GRANULOCYTES # BLD AUTO: 0.04 THOUSAND/UL (ref 0–0.2)
IMM GRANULOCYTES NFR BLD AUTO: 1 % (ref 0–2)
LACTATE SERPL-SCNC: 1.1 MMOL/L (ref 0.5–2)
LYMPHOCYTES # BLD AUTO: 0.93 THOUSANDS/ΜL (ref 0.6–4.47)
LYMPHOCYTES NFR BLD AUTO: 13 % (ref 14–44)
MCH RBC QN AUTO: 30.7 PG (ref 26.8–34.3)
MCHC RBC AUTO-ENTMCNC: 33.1 G/DL (ref 31.4–37.4)
MCV RBC AUTO: 93 FL (ref 82–98)
MONOCYTES # BLD AUTO: 1.14 THOUSAND/ΜL (ref 0.17–1.22)
MONOCYTES NFR BLD AUTO: 16 % (ref 4–12)
NEUTROPHILS # BLD AUTO: 5.18 THOUSANDS/ΜL (ref 1.85–7.62)
NEUTS SEG NFR BLD AUTO: 70 % (ref 43–75)
NRBC BLD AUTO-RTO: 0 /100 WBCS
PLATELET # BLD AUTO: 169 THOUSANDS/UL (ref 149–390)
PMV BLD AUTO: 10.3 FL (ref 8.9–12.7)
POTASSIUM SERPL-SCNC: 4 MMOL/L (ref 3.5–5.3)
RBC # BLD AUTO: 4.56 MILLION/UL (ref 3.88–5.62)
SARS-COV+SARS-COV-2 AG RESP QL IA.RAPID: NEGATIVE
SODIUM SERPL-SCNC: 131 MMOL/L (ref 135–147)
WBC # BLD AUTO: 7.32 THOUSAND/UL (ref 4.31–10.16)

## 2025-02-06 PROCEDURE — 87811 SARS-COV-2 COVID19 W/OPTIC: CPT | Performed by: EMERGENCY MEDICINE

## 2025-02-06 PROCEDURE — 36415 COLL VENOUS BLD VENIPUNCTURE: CPT | Performed by: EMERGENCY MEDICINE

## 2025-02-06 PROCEDURE — 80048 BASIC METABOLIC PNL TOTAL CA: CPT | Performed by: EMERGENCY MEDICINE

## 2025-02-06 PROCEDURE — 70496 CT ANGIOGRAPHY HEAD: CPT

## 2025-02-06 PROCEDURE — 93005 ELECTROCARDIOGRAM TRACING: CPT

## 2025-02-06 PROCEDURE — 99285 EMERGENCY DEPT VISIT HI MDM: CPT | Performed by: EMERGENCY MEDICINE

## 2025-02-06 PROCEDURE — 83605 ASSAY OF LACTIC ACID: CPT | Performed by: EMERGENCY MEDICINE

## 2025-02-06 PROCEDURE — 96361 HYDRATE IV INFUSION ADD-ON: CPT

## 2025-02-06 PROCEDURE — 84484 ASSAY OF TROPONIN QUANT: CPT | Performed by: EMERGENCY MEDICINE

## 2025-02-06 PROCEDURE — 96374 THER/PROPH/DIAG INJ IV PUSH: CPT

## 2025-02-06 PROCEDURE — 70498 CT ANGIOGRAPHY NECK: CPT

## 2025-02-06 PROCEDURE — 99285 EMERGENCY DEPT VISIT HI MDM: CPT

## 2025-02-06 PROCEDURE — 87804 INFLUENZA ASSAY W/OPTIC: CPT | Performed by: EMERGENCY MEDICINE

## 2025-02-06 PROCEDURE — 85025 COMPLETE CBC W/AUTO DIFF WBC: CPT | Performed by: EMERGENCY MEDICINE

## 2025-02-06 RX ORDER — NAPROXEN 500 MG/1
500 TABLET ORAL 2 TIMES DAILY WITH MEALS
Qty: 10 TABLET | Refills: 0 | Status: SHIPPED | OUTPATIENT
Start: 2025-02-06 | End: 2025-02-11

## 2025-02-06 RX ORDER — KETOROLAC TROMETHAMINE 30 MG/ML
15 INJECTION, SOLUTION INTRAMUSCULAR; INTRAVENOUS ONCE
Status: COMPLETED | OUTPATIENT
Start: 2025-02-06 | End: 2025-02-06

## 2025-02-06 RX ADMIN — SODIUM CHLORIDE 1000 ML: 0.9 INJECTION, SOLUTION INTRAVENOUS at 11:18

## 2025-02-06 RX ADMIN — IOHEXOL 85 ML: 350 INJECTION, SOLUTION INTRAVENOUS at 13:11

## 2025-02-06 RX ADMIN — KETOROLAC TROMETHAMINE 15 MG: 30 INJECTION, SOLUTION INTRAMUSCULAR at 11:19

## 2025-02-06 NOTE — TELEPHONE ENCOUNTER
"Reason for Disposition   Neurologic deficit that was brief (now gone), ANY of the following: * Weakness of the face, arm, or leg on one side of the body * Numbness of the face, arm, or leg on one side of the body * Loss of speech or garbled speech   Patient sounds very sick or weak to the triager    Answer Assessment - Initial Assessment Questions  1. SYMPTOM: \"What is the main symptom you are concerned about?\" (e.g., weakness, numbness)      Left numb /weak  2. ONSET: \"When did this start?\" (minutes, hours, days; while sleeping)      Woke up this morning   3. LAST NORMAL: \"When was the last time you (the patient) were normal (no symptoms)?\"      Yesterday   4. PATTERN \"Does this come and go, or has it been constant since it started?\"  \"Is it present now?\"      Comes and goes due to a spinal issue but he states its worse now  5. CARDIAC SYMPTOMS: \"Have you had any of the following symptoms: chest pain, difficulty breathing, palpitations?\"      denies  6. NEUROLOGIC SYMPTOMS: \"Have you had any of the following symptoms: headache, dizziness, vision loss, double vision, changes in speech, unsteady on your feet?\"      Headache but states this is chronic, denies other s/s    7. OTHER SYMPTOMS: \"Do you have any other symptoms?\"      denies  8. PREGNANCY: \"Is there any chance you are pregnant?\" \"When was your last menstrual period?\"      N/a    Protocols used: Neurologic Deficit-Adult-[place]    "

## 2025-02-06 NOTE — ED PROVIDER NOTES
"Final Diagnosis:  1. Influenza A        MDM:  -Patient presents for evaluation of tingling sensation and tremor in left arm.  Given concerns will evaluate for ischemic event though I think this to be less likely.  Will also evaluate for ACS or arrhythmia.  Evaluate for signs of infection, electrolyte disturbances.  - Provide antipyretics.  - CTA without acute findings.  Patient was found positive for influenza A.  I discussed options with patient.  I discussed possible admission to the hospital for MRI and monitoring of systems versus discharge home with close follow-up.  Patient is comfortable with discharge at this time.  States that he would prefer to go home.  Patient's current symptoms are likely related to his ongoing cervical spine issues.  Discussed at length return precautions.    Chief Complaint   Patient presents with    Extremity Weakness     Patient presents with left arm numbness/shaking since 0800 this morning, no other extremity weakness. Known spinal cord injury and states its normal to get numbness and tingling in extremities but \"this feels different\"      HPI  Patient presents for evaluation of change in sensation of left arm.  Patient states that he has a history of cervical spinal issues and sometimes that he has issues with his left arm but today they seem more pronounced.  He contacted primary care instructed him to come in for further evaluation.  Patient states that he has been having flulike symptoms for the past couple days.  Review of records show they saw another provider 2 days ago.  At that point it was discussed that he had been having symptoms for about the last month.  He has been on 2 courses of antibiotics without any improvement.  They have a low threshold for repeat CT imaging of chest.  Patient has been endorsing general malaise.  States he was tested for COVID but does not believe he was tested for flu.      Unless otherwise specified:  - No language barrier.   - History " obtained from patient.   - There are no limitations to the history obtained.  - Previous charting was reviewed    PMH:   has a past medical history of Arthritis, Benign prostatic hyperplasia with incomplete bladder emptying (02/28/2024), Bilateral pulmonary embolism (HCC) (06/23/2024), Chronic pain disorder, COVID (2021), GERD (gastroesophageal reflux disease), and Spinal cord dysplasia (HCC).    PSH:   has a past surgical history that includes Cervical fusion; Nasal septoplasty w/ turbinoplasty (08/22/2003); Hernia repair (Left); Tooth extraction; pr bx prostate strtctc saturation sampling img gid (N/A, 11/22/2023); EGD; and pr trurl electrosurg rescj prostate bleed complete (N/A, 2/28/2024).       ROS:  Review of Systems   - 13 point ROS was performed and all are normal unless stated in the history above.   - Nursing note reviewed. Vitals reviewed.   - Orders placed by myself and/or advanced practitioner / resident.        PE:   Vitals:    02/06/25 1102 02/06/25 1300 02/06/25 1425   BP: (!) 174/84 123/67 131/76   BP Location: Right arm     Pulse: 95 74 97   Resp: 18 19 18   Temp: (!) 100.6 °F (38.1 °C) 100 °F (37.8 °C) 99.8 °F (37.7 °C)   TempSrc: Temporal Temporal Temporal   SpO2: 96% 94% 97%   Weight: 92.1 kg (203 lb 0.7 oz)       Vitals reviewed by me.     Sensation intact in bilateral upper extremities.  No facial asymmetry.  Good strength throughout.  No obvious focal neurological deficits.  Unless otherwise specified above:    General: VS reviewed  Appears in NAD    Head: Normocephalic, atraumatic  Eyes: EOM-I.     ENT: Atraumatic external nose and ears.    No drooling.     Neck: No JVD.    CV: No pallor noted  Lungs:   No tachypnea  No respiratory distress    Abdomen:  Soft, non-tender, non-distended    MSK:   No obvious deformity    Skin: Dry, intact. No obvious rash.    Psychiatric/Behavioral: Appropriate mood and affect   Exam: deferred    Physical Exam     Procedures   - Nursing note reviewed.                       CTA head and neck with and without contrast   Final Result      CT Brain:   - No acute intracranial abnormality given beam-hardening streak artifact limit evaluation of the pete. If continued or worsened symptoms, consider follow-up MRI brain without contrast for further evaluation.   - Mild chronic microangiopathy.      CT Angiography:   - Negative CTA head and neck for large vessel occlusion, dissection, aneurysm, or high-grade stenosis.   - Mild atherosclerotic disease of the head and neck, as detailed above.      C1-C2 posterior spinal fixation hardware. No acute hardware complication.      Additional chronic/incidental findings as detailed above.                  Workstation performed: [id number]           Orders Placed This Encounter   Procedures    FLU/COVID Rapid Antigen (30 min. TAT) - Preferred screening test in ED    CTA head and neck with and without contrast    CBC and differential    Basic metabolic panel    Lactic acid, plasma (w/reflex if result > 2.0)    HS Troponin 0hr (reflex protocol)    Insert peripheral IV    ECG 12 lead     Labs Reviewed   COVID-19/INFLUENZA A/B RAPID ANTIGEN (30 MIN.TAT) - Abnormal       Result Value Ref Range Status    SARS COV Rapid Antigen Negative  Negative Final    Influenza A Rapid Antigen Positive (*) Negative Final    Influenza B Rapid Antigen Negative  Negative Final    Narrative:     This test has been performed using the [employer] [facility] 2 FLU+SARS Antigen test under the Emergency Use Authorization (EUA). This test has been validated by the  and verified by the performing laboratory. The Alina uses lateral flow immunofluorescent sandwich assay to detect SARS-COV, Influenza A and Influenza B Antigen.     The [employer] [facility] 2 SARS Antigen test does not differentiate between SARS-CoV and SARS-CoV-2.     Negative results are presumptive and may be confirmed with a molecular assay, if necessary, for patient management. Negative results do not rule out  SARS-CoV-2 or influenza infection and should not be used as the sole basis for treatment or patient management decisions. A negative test result may occur if the level of antigen in a sample is below the limit of detection of this test.     Positive results are indicative of the presence of viral antigens, but do not rule out bacterial infection or co-infection with other viruses.     All test results should be used as an adjunct to clinical observations and other information available to the provider.    FOR PEDIATRIC PATIENTS - copy/paste COVID Guidelines URL to browser: [email or web address]   CBC AND DIFFERENTIAL - Abnormal    WBC 7.32  4.31 - 10.16 Thousand/uL Final    RBC 4.56  3.88 - 5.62 Million/uL Final    Hemoglobin 14.0  12.0 - 17.0 g/dL Final    Hematocrit 42.3  36.5 - 49.3 % Final    MCV 93  82 - 98 fL Final    MCH 30.7  26.8 - 34.3 pg Final    MCHC 33.1  31.4 - 37.4 g/dL Final    RDW 13.0  11.6 - 15.1 % Final    MPV 10.3  8.9 - 12.7 fL Final    Platelets 169  149 - 390 Thousands/uL Final    nRBC 0  /100 WBCs Final    Segmented % 70  43 - 75 % Final    Immature Grans % 1  0 - 2 % Final    Lymphocytes % 13 (*) 14 - 44 % Final    Monocytes % 16 (*) 4 - 12 % Final    Eosinophils Relative 0  0 - 6 % Final    Basophils Relative 0  0 - 1 % Final    Absolute Neutrophils 5.18  1.85 - 7.62 Thousands/µL Final    Absolute Immature Grans 0.04  0.00 - 0.20 Thousand/uL Final    Absolute Lymphocytes 0.93  0.60 - 4.47 Thousands/µL Final    Absolute Monocytes 1.14  0.17 - 1.22 Thousand/µL Final    Eosinophils Absolute 0.00  0.00 - 0.61 Thousand/µL Final    Basophils Absolute 0.03  0.00 - 0.10 Thousands/µL Final   BASIC METABOLIC PANEL - Abnormal    Sodium 131 (*) 135 - 147 mmol/L Final    Potassium 4.0  3.5 - 5.3 mmol/L Final    Chloride 96  96 - 108 mmol/L Final    CO2 26  21 - 32 mmol/L Final    ANION GAP 9  4 - 13 mmol/L Final    BUN 20  5 - 25 mg/dL Final     "Creatinine 1.35 (*) 0.60 - 1.30 mg/dL Final    Comment: Standardized to IDMS reference method    Glucose 97  65 - 140 mg/dL Final    Comment: If the patient is fasting, the ADA then defines impaired fasting glucose as > 100 mg/dL and diabetes as > or equal to 123 mg/dL.    Calcium 9.1  8.4 - 10.2 mg/dL Final    eGFR 54  ml/min/1.73sq m Final    Narrative:     National Kidney Disease Foundation guidelines for Chronic Kidney Disease (CKD):     Stage 1 with normal or high GFR (GFR > 90 mL/min/1.73 square meters)    Stage 2 Mild CKD (GFR = 60-89 mL/min/1.73 square meters)    Stage 3A Moderate CKD (GFR = 45-59 mL/min/1.73 square meters)    Stage 3B Moderate CKD (GFR = 30-44 mL/min/1.73 square meters)    Stage 4 Severe CKD (GFR = 15-29 mL/min/1.73 square meters)    Stage 5 End Stage CKD (GFR <15 mL/min/1.73 square meters)  Note: GFR calculation is accurate only with a steady state creatinine   LACTIC ACID, PLASMA (W/REFLEX IF RESULT > 2.0) - Normal    LACTIC ACID 1.1  0.5 - 2.0 mmol/L Final    Narrative:     Result may be elevated if tourniquet was used during collection.   HS TROPONIN I 0HR - Normal    hs TnI 0hr 9  \"Refer to ACS Flowchart\"- see link ng/L Final    Comment:                                              Initial (time 0) result  If >=50 ng/L, Myocardial injury suggested ;  Type of myocardial injury and treatment strategy  to be determined.  If 5-49 ng/L, a delta result at 2 hours and or 4 hours will be needed to further evaluate.  If <4 ng/L, and chest pain has been >3 hours since onset, patient may qualify for discharge based on the HEART score in the ED.  If <5 ng/L and <3hours since onset of chest pain, a delta result at 2 hours will be needed to further evaluate.    HS Troponin 99th Percentile URL of a Health Population=12 ng/L with a 95% Confidence Interval of 8-18 ng/L.    Second Troponin (time 2 hours)  If calculated delta >= 20 ng/L,  Myocardial injury suggested ; Type of myocardial injury and " treatment strategy to be determined.  If 5-49 ng/L and the calculated delta is 5-19 ng/L, consult medical service for evaluation.  Continue evaluation for ischemia on ecg and other possible etiology and repeat hs troponin at 4 hours.  If delta is <5 ng/L at 2 hours, consider discharge based on risk stratification via the HEART score (if in ED), or FAVIAN risk score in IP/Observation.    HS Troponin 99th Percentile URL of a Health Population=12 ng/L with a 95% Confidence Interval of 8-18 ng/L.         Final Diagnosis:  1. Influenza A              Unless otherwise noted the patient's medications were reviewed and they should continue as directed.    Unless otherwise specified:  CC is exclusive from any separately billable procedures  CC is exclusive of treating other patients  CC is exclusive of teaching time   All splints are static    Medications   sodium chloride 0.9 % bolus 1,000 mL (0 mL Intravenous Stopped 2/6/25 1218)   ketorolac (TORADOL) injection 15 mg (15 mg Intravenous Given 2/6/25 1119)   iohexol (OMNIPAQUE) 350 MG/ML injection (MULTI-DOSE) 85 mL (85 mL Intravenous Given 2/6/25 1311)     Time reflects when diagnosis was documented in both MDM as applicable and the Disposition within this note       Time User Action Codes Description Comment    2/6/2025  2:07 PM Aric De Leon Add [J10.1] Influenza A           ED Disposition       ED Disposition   Discharge    Condition   Stable    Date/Time   Thu Feb 6, 2025  2:06 PM    Comment   Vini Ybarra discharge to home/self care.                   Follow-up Information       Follow up With Specialties Details Why Contact Info    Gui Espinosa, DO Internal Medicine   [address] [place] [id number] [phone]            Discharge Medication List as of 2/6/2025  2:07 PM        START taking these medications    Details   dextromethorphan-guaifenesin (MUCINEX DM)  MG per 12 hr tablet Take 1 tablet by mouth every 12 (twelve) hours for 5 days,  Starting Thu 2/6/2025, Until Tue 2/11/2025, Normal      naproxen (Naprosyn) 500 mg tablet Take 1 tablet (500 mg total) by mouth 2 (two) times a day with meals for 5 days, Starting Thu 2/6/2025, Until Tue 2/11/2025, Normal           CONTINUE these medications which have NOT CHANGED    Details   albuterol (Ventolin HFA) 90 mcg/act inhaler Inhale 2 puffs every 6 (six) hours as needed for wheezing, Starting Fri 12/20/2024, Normal      apixaban (ELIQUIS) 2.5 mg Take 1 tablet (2.5 mg total) by mouth 2 (two) times a day, Starting Thu 1/9/2025, Normal      cholecalciferol (VITAMIN D3) 1,000 units tablet Take 1,000 Units by mouth daily, Historical Med      gabapentin (NEURONTIN) 300 mg capsule Take 300 mg by mouth 3 (three) times a day as needed, Historical Med      ketoconazole (NIZORAL) 2 % cream apply to affected area twice a day ON FACE TILL CLEAR FOR FLARES, Historical Med      omeprazole (PriLOSEC) 20 mg delayed release capsule Take 1 capsule (20 mg total) by mouth daily, Starting Fri 1/17/2025, Normal      oxyCODONE (OXY-IR) 5 MG capsule Take 1 capsule (5 mg total) by mouth 2 (two) times a day as needed for moderate pain Max Daily Amount: 10 mg, Starting Tue 9/17/2024, Normal      predniSONE 10 mg tablet 4 pills for 2 days, then 3 pills for 2 days, then 2 pills for 2 days then 1 pill for 2 days., Normal      !! promethazine-dextromethorphan (PHENERGAN-DM) 6.25-15 mg/5 mL oral syrup Take 5 mL by mouth 4 (four) times a day as needed for cough, Starting Fri 1/10/2025, Normal      !! promethazine-dextromethorphan (PHENERGAN-DM) 6.25-15 mg/5 mL oral syrup Take 5 mL by mouth 4 (four) times a day as needed for cough, Starting Tue 2/4/2025, Normal      rosuvastatin (CRESTOR) 5 mg tablet Take 1 tablet (5 mg total) by mouth daily, Starting Wed 5/22/2024, Normal      Zinc 50 MG CAPS Take by mouth, Historical Med       !! - Potential duplicate medications found. Please discuss with provider.        No discharge procedures on  "file.  Prior to Admission Medications   Prescriptions Last Dose Informant Patient Reported? Taking?   Zinc 50 MG CAPS  Self Yes No   Sig: Take by mouth   albuterol (Ventolin HFA) 90 mcg/act inhaler   No No   Sig: Inhale 2 puffs every 6 (six) hours as needed for wheezing   apixaban (ELIQUIS) 2.5 mg   No No   Sig: Take 1 tablet (2.5 mg total) by mouth 2 (two) times a day   cholecalciferol (VITAMIN D3) 1,000 units tablet  Self Yes No   Sig: Take 1,000 Units by mouth daily   gabapentin (NEURONTIN) 300 mg capsule  Self Yes No   Sig: Take 300 mg by mouth 3 (three) times a day as needed   ketoconazole (NIZORAL) 2 % cream  Self Yes No   Sig: apply to affected area twice a day ON FACE TILL CLEAR FOR FLARES   omeprazole (PriLOSEC) 20 mg delayed release capsule   No No   Sig: Take 1 capsule (20 mg total) by mouth daily   oxyCODONE (OXY-IR) 5 MG capsule  Self No No   Sig: Take 1 capsule (5 mg total) by mouth 2 (two) times a day as needed for moderate pain Max Daily Amount: 10 mg   predniSONE 10 mg tablet   No No   Si pills for 2 days, then 3 pills for 2 days, then 2 pills for 2 days then 1 pill for 2 days.   Patient not taking: Reported on 2025   promethazine-dextromethorphan (PHENERGAN-DM) 6.25-15 mg/5 mL oral syrup   No No   Sig: Take 5 mL by mouth 4 (four) times a day as needed for cough   Patient not taking: Reported on 2025   promethazine-dextromethorphan (PHENERGAN-DM) 6.25-15 mg/5 mL oral syrup   No No   Sig: Take 5 mL by mouth 4 (four) times a day as needed for cough   rosuvastatin (CRESTOR) 5 mg tablet  Self No No   Sig: Take 1 tablet (5 mg total) by mouth daily      Facility-Administered Medications: None       Portions of the record may have been created with voice recognition software. Occasional wrong word or \"sound a like\" substitutions may have occurred due to the inherent limitations of voice recognition software. Read the chart carefully and recognize, using context, where substitutions have " occurred.    Electronically signed by:  MD Aric Bunch MD  02/06/25 [id number]

## 2025-02-07 ENCOUNTER — TELEPHONE (OUTPATIENT)
Age: 65
End: 2025-02-07

## 2025-02-07 LAB
ATRIAL RATE: 94 BPM
P AXIS: 28 DEGREES
PR INTERVAL: 170 MS
QRS AXIS: 2 DEGREES
QRSD INTERVAL: 92 MS
QT INTERVAL: 324 MS
QTC INTERVAL: 405 MS
T WAVE AXIS: 37 DEGREES
VENTRICULAR RATE: 94 BPM

## 2025-02-07 PROCEDURE — 93010 ELECTROCARDIOGRAM REPORT: CPT | Performed by: INTERNAL MEDICINE

## 2025-02-07 NOTE — TELEPHONE ENCOUNTER
Phone call from patient stating he would like Dr Espinosa to review his recent Er Visit and see if her has any other recommendations he should do until he comes in for a follow up appointment next week. Patient also states he would like to know if he should restart his cholesterol medication as he had a screening done and they told him his cholesterol was 268. They told him about taking red yeast rice supplement and fish oil capsule.  Please advise. Thank you.

## 2025-02-07 NOTE — TELEPHONE ENCOUNTER
Notify patient his symptoms were all likely related to the flu... he may hold his cholesterol meds for 7 days until he recovers from this

## 2025-02-11 ENCOUNTER — OFFICE VISIT (OUTPATIENT)
Dept: FAMILY MEDICINE CLINIC | Facility: CLINIC | Age: 65
End: 2025-02-11
Payer: COMMERCIAL

## 2025-02-11 VITALS
BODY MASS INDEX: 28.77 KG/M2 | HEART RATE: 68 BPM | OXYGEN SATURATION: 99 % | DIASTOLIC BLOOD PRESSURE: 80 MMHG | HEIGHT: 70 IN | WEIGHT: 201 LBS | SYSTOLIC BLOOD PRESSURE: 128 MMHG

## 2025-02-11 DIAGNOSIS — J10.1 INFLUENZA A: Primary | ICD-10-CM

## 2025-02-11 DIAGNOSIS — R76.0 LUPUS ANTICOAGULANT POSITIVE: ICD-10-CM

## 2025-02-11 DIAGNOSIS — G95.89 MYELOMALACIA OF CERVICAL CORD (HCC): ICD-10-CM

## 2025-02-11 DIAGNOSIS — J45.20 MILD INTERMITTENT ASTHMATIC BRONCHITIS WITHOUT COMPLICATION: ICD-10-CM

## 2025-02-11 PROCEDURE — G2211 COMPLEX E/M VISIT ADD ON: HCPCS | Performed by: INTERNAL MEDICINE

## 2025-02-11 PROCEDURE — 99213 OFFICE O/P EST LOW 20 MIN: CPT | Performed by: INTERNAL MEDICINE

## 2025-02-11 RX ORDER — METHYLPREDNISOLONE 4 MG/1
TABLET ORAL
Qty: 21 EACH | Refills: 0 | Status: SHIPPED | OUTPATIENT
Start: 2025-02-11

## 2025-02-11 RX ORDER — FLUTICASONE PROPIONATE AND SALMETEROL 100; 50 UG/1; UG/1
1 POWDER RESPIRATORY (INHALATION) 2 TIMES DAILY
Qty: 180 BLISTER | Refills: 3 | Status: SHIPPED | OUTPATIENT
Start: 2025-02-11 | End: 2026-02-06

## 2025-02-11 NOTE — PROGRESS NOTES
Name: Vini Ybarra      : 1960      MRN: [id number]  Encounter Provider: Gui Espinosa DO  Encounter Date: 2025   Encounter department: [facility] PRIMARY CARE  :  Assessment & Plan  Influenza A  Currently having reactive airway.  Given Wixela as well as methylprednisolone at this point.  Orders:  •  Fluticasone-Salmeterol (Advair) 100-50 mcg/dose inhaler; Inhale 1 puff 2 (two) times a day Rinse mouth after use.  •  methylPREDNISolone 4 MG tablet therapy pack; Use as directed on package    Myelomalacia of cervical cord (HCC)  Suspect this is the reason for his underlying left arm numbness.  This is often where he has his weakness and discomfort.  CTA notably was negative in the emergency room.  After the Medrol if he has any further symptoms and is not back to baseline would consider reimaging of his cervical spine.       Lupus anticoagulant positive  Remains on Eliquis at this point.  History of bilateral pulmonary emboli.       Mild intermittent asthmatic bronchitis without complication  Suspect he is developing reactive airway related to recent viral infection.  Orders:  •  Fluticasone-Salmeterol (Advair) 100-50 mcg/dose inhaler; Inhale 1 puff 2 (two) times a day Rinse mouth after use.  •  methylPREDNISolone 4 MG tablet therapy pack; Use as directed on package           History of Present Illness   Patient was seen in the ER, diagnosed with flu A.  Time is having left hand numbness.  Believe this is related to the cough has underlying cervical disc disease.  This is improved and he is back to baseline at this point.  Continues with cough and wheezing at times.  No headache.  General fatigue.  Weight loss associated with this.  Was seen 2 days before and tested negative for flu and COVID.  Was going to follow-up with pulmonary but will hold off on this for now.  Notably chest x-ray was negative.      Review of Systems   Constitutional:  Positive for fever. Negative for chills.   HENT:  " Negative for ear pain and sore throat.    Eyes:  Negative for pain and visual disturbance.   Respiratory:  Positive for cough, shortness of breath and wheezing.    Cardiovascular:  Negative for chest pain and palpitations.   Gastrointestinal:  Negative for abdominal pain and vomiting.   Genitourinary:  Negative for dysuria and hematuria.   Musculoskeletal:  Positive for neck pain. Negative for arthralgias and back pain.   Skin:  Negative for color change and rash.   Neurological:  Positive for tremors and weakness. Negative for seizures and syncope.   All other systems reviewed and are negative.      Objective   /80 (BP Location: Left arm, Patient Position: Sitting, Cuff Size: Adult)   Pulse 68   Ht 5' 10\" (1.778 m)   Wt 91.2 kg (201 lb)   SpO2 99%   BMI 28.84 kg/m²      Physical Exam  Vitals and nursing note reviewed.   Constitutional:       General: He is not in acute distress.     Appearance: Normal appearance. He is well-developed.   HENT:      Head: Normocephalic and atraumatic.   Eyes:      Conjunctiva/sclera: Conjunctivae normal.   Cardiovascular:      Rate and Rhythm: Normal rate and regular rhythm.      Pulses: Normal pulses.      Heart sounds: Normal heart sounds. No murmur heard.  Pulmonary:      Effort: No respiratory distress.      Breath sounds: Wheezing present.   Abdominal:      Palpations: Abdomen is soft.      Tenderness: There is no abdominal tenderness.   Musculoskeletal:         General: No swelling.      Cervical back: Neck supple.   Skin:     General: Skin is warm and dry.      Capillary Refill: Capillary refill takes less than 2 seconds.   Neurological:      General: No focal deficit present.      Mental Status: He is alert and oriented to person, place, and time.      Sensory: Sensory deficit present.      Comments: Numbness left fore arm   Psychiatric:         Mood and Affect: Mood normal.         "

## 2025-02-11 NOTE — ASSESSMENT & PLAN NOTE
Suspect this is the reason for his underlying left arm numbness.  This is often where he has his weakness and discomfort.  CTA notably was negative in the emergency room.  After the Medrol if he has any further symptoms and is not back to baseline would consider reimaging of his cervical spine.

## 2025-02-12 ENCOUNTER — TELEPHONE (OUTPATIENT)
Age: 65
End: 2025-02-12

## 2025-02-12 DIAGNOSIS — R10.13 EPIGASTRIC PAIN: ICD-10-CM

## 2025-02-12 DIAGNOSIS — J20.9 ACUTE BRONCHITIS, UNSPECIFIED ORGANISM: Primary | ICD-10-CM

## 2025-02-12 DIAGNOSIS — R13.19 ESOPHAGEAL DYSPHAGIA: ICD-10-CM

## 2025-02-12 DIAGNOSIS — K21.9 GASTROESOPHAGEAL REFLUX DISEASE, UNSPECIFIED WHETHER ESOPHAGITIS PRESENT: ICD-10-CM

## 2025-02-12 RX ORDER — PREDNISONE 10 MG/1
TABLET ORAL
Qty: 20 TABLET | Refills: 0 | Status: SHIPPED | OUTPATIENT
Start: 2025-02-12

## 2025-02-12 RX ORDER — CEFUROXIME AXETIL 500 MG/1
500 TABLET ORAL EVERY 12 HOURS SCHEDULED
Qty: 14 TABLET | Refills: 0 | Status: SHIPPED | OUTPATIENT
Start: 2025-02-12 | End: 2025-02-19

## 2025-02-12 NOTE — TELEPHONE ENCOUNTER
Patient calling to follow up from OV yesterday. Patient wanting to report that he is having a productive cough with green mucous. Patient now wants to know if he should start an antibiotic. Also, patient wants to report that he has been taking Mucinex but is unsure if he should continue to take it. Please advise.

## 2025-02-28 ENCOUNTER — OFFICE VISIT (OUTPATIENT)
Dept: FAMILY MEDICINE CLINIC | Facility: CLINIC | Age: 65
End: 2025-02-28
Payer: COMMERCIAL

## 2025-02-28 VITALS
HEART RATE: 64 BPM | HEIGHT: 70 IN | SYSTOLIC BLOOD PRESSURE: 118 MMHG | DIASTOLIC BLOOD PRESSURE: 70 MMHG | BODY MASS INDEX: 28.86 KG/M2 | WEIGHT: 201.6 LBS | TEMPERATURE: 97.9 F | OXYGEN SATURATION: 99 %

## 2025-02-28 DIAGNOSIS — F11.20 OPIOID DEPENDENCE, UNCOMPLICATED (HCC): ICD-10-CM

## 2025-02-28 DIAGNOSIS — R30.0 DYSURIA: Primary | ICD-10-CM

## 2025-02-28 DIAGNOSIS — R05.9 COUGH, UNSPECIFIED TYPE: ICD-10-CM

## 2025-02-28 DIAGNOSIS — R10.31 RIGHT LOWER QUADRANT ABDOMINAL PAIN: ICD-10-CM

## 2025-02-28 LAB
SL AMB  POCT GLUCOSE, UA: NEGATIVE
SL AMB LEUKOCYTE ESTERASE,UA: NEGATIVE
SL AMB POCT BILIRUBIN,UA: NEGATIVE
SL AMB POCT BLOOD,UA: NEGATIVE
SL AMB POCT CLARITY,UA: CLEAR
SL AMB POCT COLOR,UA: YELLOW
SL AMB POCT KETONES,UA: NEGATIVE
SL AMB POCT NITRITE,UA: NEGATIVE
SL AMB POCT PH,UA: 6.5
SL AMB POCT SPECIFIC GRAVITY,UA: 1.01
SL AMB POCT URINE PROTEIN: NEGATIVE
SL AMB POCT UROBILINOGEN: NEGATIVE

## 2025-02-28 PROCEDURE — 81002 URINALYSIS NONAUTO W/O SCOPE: CPT | Performed by: NURSE PRACTITIONER

## 2025-02-28 PROCEDURE — 99214 OFFICE O/P EST MOD 30 MIN: CPT | Performed by: NURSE PRACTITIONER

## 2025-02-28 PROCEDURE — 87086 URINE CULTURE/COLONY COUNT: CPT | Performed by: NURSE PRACTITIONER

## 2025-02-28 NOTE — PROGRESS NOTES
Name: Vini Ybarar      : 1960      MRN: [id number]  Encounter Provider: LILIANA Marie  Encounter Date: 2025   Encounter department: [facility] PRIMARY CARE  :  Assessment & Plan  Dysuria    Orders:  •  POCT urine dip  •  Urine culture; Future    Right lower quadrant abdominal pain  Right pelvic soft tissue.  Likely musculoskeletal     I do not feel a hernia but he can certainly let me know if he is having a problem ultimately I think if he has continued urological symptoms he should return to the urologist  Cough, unspecified type  Much improved.  Mild reactive, reviewed irritants pot potential triggers.  Recommend he continue his Advair for another 4 weeks and [place] and follow-up if symptoms are not completely resolved       Opioid dependence, uncomplicated (HCC)                History of Present Illness   Basically had flu around [place] and has a persistent cough.  Had seen Dr. Stevenson for this and a chest x-ray was unremarkable except for some atelectasis so he was suggested to follow-up with pulmonary.  Dr. Aragon saw him and added Wixela Medrol Dosepak and Ceftin , and he finally did improve with almost complete resolution of the cough.  That was until 4 days ago , he had an episode of diarrhea.  And started with mild head congestion and cough again.  He also notes he has a history of a hernia he did follow with urology he has and intermittent pain that seems to be coming from deep inside his bladder or prostate it is momentary.  He did see urology for this no issue was found.  He does have some chronic frequency but no new urgency or odor no fever chills  He does notice he is got a painful slightly swollen area in his right lower pelvic region.  This is separate from the usual bladder issues and he was afraid he might have a hernia.      Review of Systems    Objective   /70 (BP Location: Left arm, Patient Position: Sitting, Cuff Size: Adult)   Pulse 64   Temp 97.9 °F  "(36.6 °C)   Ht 5' 10\" (1.778 m)   Wt 91.4 kg (201 lb 9.6 oz)   SpO2 99%   BMI 28.93 kg/m²      Physical Exam  HENT:      Right Ear: Tympanic membrane normal.      Left Ear: Tympanic membrane normal.      Nose: No congestion or rhinorrhea.      Mouth/Throat:      Pharynx: No oropharyngeal exudate or posterior oropharyngeal erythema.   Cardiovascular:      Rate and Rhythm: Normal rate.   Pulmonary:      Effort: Pulmonary effort is normal.      Breath sounds: Normal breath sounds.      Comments: Brief mild cough during exam  Genitourinary:     Comments: Minimal swelling and tenderness in his lower right pelvic region but no distinct hernia was palpated.  Lymphadenopathy:      Cervical: No cervical adenopathy.         "

## 2025-03-01 LAB — BACTERIA UR CULT: NORMAL

## 2025-03-03 ENCOUNTER — RESULTS FOLLOW-UP (OUTPATIENT)
Dept: FAMILY MEDICINE CLINIC | Facility: CLINIC | Age: 65
End: 2025-03-03

## 2025-03-03 NOTE — RESULT ENCOUNTER NOTE
Notify patient that his urine test was completely normal.  No evidence of infection.  If he feels he still has symptoms he should really follow-up with urologist

## 2025-03-03 NOTE — TELEPHONE ENCOUNTER
"Patient calling back for urine test results. Relayed verbatim message from Tennille Lazaro NP: \"Notify patient that his urine test was completely normal. No evidence of infection. If he feels he still has symptoms he should really follow-up with urologist\". Patient has no further questions or concerns.   "

## 2025-03-21 ENCOUNTER — CONSULT (OUTPATIENT)
Dept: HEMATOLOGY ONCOLOGY | Facility: CLINIC | Age: 65
End: 2025-03-21
Payer: COMMERCIAL

## 2025-03-21 VITALS
HEIGHT: 70 IN | TEMPERATURE: 98 F | BODY MASS INDEX: 29.06 KG/M2 | HEART RATE: 69 BPM | WEIGHT: 203 LBS | DIASTOLIC BLOOD PRESSURE: 72 MMHG | OXYGEN SATURATION: 99 % | SYSTOLIC BLOOD PRESSURE: 142 MMHG

## 2025-03-21 DIAGNOSIS — R76.0 LUPUS ANTICOAGULANT POSITIVE: Primary | ICD-10-CM

## 2025-03-21 DIAGNOSIS — I26.99 BILATERAL PULMONARY EMBOLISM (HCC): ICD-10-CM

## 2025-03-21 PROCEDURE — 99204 OFFICE O/P NEW MOD 45 MIN: CPT | Performed by: NURSE PRACTITIONER

## 2025-03-21 RX ORDER — NORTRIPTYLINE HYDROCHLORIDE 10 MG/1
CAPSULE ORAL
COMMUNITY
Start: 2025-03-13

## 2025-03-21 NOTE — PROGRESS NOTES
Name: Vini Ybarra      : 1960      MRN: [id number]  Encounter Provider: LILIANA Rivera  Encounter Date: 3/21/2025   Encounter department: [facility] [place]  :  Assessment & Plan  Bilateral pulmonary embolism (HCC)   Patient had DVT in , likely provoked, secondary to spinal surgery.  He reports being on Xarelto for a few months.  In 2024 patient had bilateral pulmonary embolisms, unclear etiology.  He did have what was believed to be pneumonia around the same time.  He denied having any recent provoking factors such as travel, long periods of sitting, or trauma.  The lupus anticoagulant in  was not detected however the dilute viper venom time was elevated.  Comment suggested results are consistent with the specific inhibitors to 1 or more common pathways, factor ask, factor V, factor II or fibrinogen. Beta-2 glycoprotein and cardiolipin antibodies were within normal limits.   Patient had a repeat lupus anticoagulant done in 2024 that showed no lupus anticoagulant detected.  Dilute viper venom time was normal.  We discussed at length whether he needed further hypercoagulable workup.  Patient states his mother was on Coumadin and had a history of a stroke.  Because patient had 2 separate clotting incidences he should remain on lifelong anticoagulation.  Whether factor V Leiden or prothrombin gene mutation would be positive, will not change our recommendations.  If patient has another thrombotic event on Eliquis that would be considered a failure and Coumadin or Pradaxa could be considered.  I recommend he continue on lifelong anticoagulation as pulmonary had recommended.  Patient does not need to follow with our practice.  We will see him on a as needed basis.  Patient is not up-to-date on colonoscopy, most recent done in .  He does have an appointment with GI later this year.    Orders:    Ambulatory Referral to Hematology / Oncology    Lupus  anticoagulant positive  See above.       No follow-ups on file.    History of Present Illness   No chief complaint on file.  Consult    Pertinent Medical History      03/21/25: Patient is a 64-year-old male with a history of arthritis, BPH, chronic pain disorder, GERD, spinal cord dysplasia, and DVT approximately 10 yrs ago considered provoked from cervical fusion surgery, history of chronic opiate pain medication dependence, GERD who was hospitalized in June 2024 secondary to  2-day history of pleuritic chest and left-sided pain.  CTA of the chest showed 1. Small peripheral bilateral pulmonary arterial filling defects consistent with acute pulmonary emboli. Somewhat elevated RV/LV ratio at 1.03.   2. Left bibasilar atelectasis. More focal dependent left lower lobe opacification could also represent pneumonia. Small loculated left pleural effusion.   3. Mildly enlarged mediastinal and left hilar lymph nodes, likely reactive in nature.    Thrombosis panel showed no lupus anticoagulant, beta-2 glycoprotein and cardiolipin antibodies within normal limits, protein S and protein C were also normal.  Antithrombin III activity was normal.  Patient was initiated on a heparin drip, 2D echo performed showing ejection fraction 55%, no right sided heart strain, and he was started on Eliquis.  Patient is anticipated to remain on lifelong anticoagulation.     Review of Systems   Constitutional:  Negative for activity change, appetite change, fatigue, fever and unexpected weight change.   Respiratory:  Negative for cough and shortness of breath.    Cardiovascular:  Negative for chest pain and leg swelling.   Gastrointestinal:  Negative for abdominal pain, constipation, diarrhea and nausea.   Endocrine: Negative for cold intolerance and heat intolerance.   Musculoskeletal:  Negative for arthralgias and myalgias.   Skin: Negative.    Neurological:  Negative for dizziness, weakness and headaches.   Hematological:  Negative for  "adenopathy. Does not bruise/bleed easily.       Objective   /72 (BP Location: Left arm, Patient Position: Sitting, Cuff Size: Standard)   Pulse 69   Temp 98 °F (36.7 °C) (Temporal)   Ht 5' 10\" (1.778 m)   Wt 92.1 kg (203 lb)   SpO2 99%   BMI 29.13 kg/m²     Physical Exam  Vitals reviewed.   Constitutional:       Appearance: Normal appearance. He is well-developed.   HENT:      Head: Normocephalic and atraumatic.   Eyes:      Pupils: Pupils are equal, round, and reactive to light.   Pulmonary:      Effort: Pulmonary effort is normal. No respiratory distress.   Musculoskeletal:         General: Normal range of motion.      Cervical back: Normal range of motion.   Lymphadenopathy:      Cervical: No cervical adenopathy.   Skin:     General: Skin is dry.   Neurological:      Mental Status: He is alert and oriented to person, place, and time.   Psychiatric:         Behavior: Behavior normal.     Labs: I have reviewed the following labs:  Results for orders placed or performed in visit on 02/28/25    Specimen: Urine, Clean Catch   Result Value Ref Range    Urine Culture No Growth <1000 cfu/mL    POCT urine dip   Result Value Ref Range    LEUKOCYTE ESTERASE,UA Negative     NITRITE,UA Negative     SL AMB POCT UROBILINOGEN Negative     POCT URINE PROTEIN Negative      PH,UA 6.5     BLOOD,UA Negative     SPECIFIC GRAVITY,UA 1.010     KETONES,UA Negative     BILIRUBIN,UA Negative     GLUCOSE, UA Negative      COLOR,UA Yellow     CLARITY,UA Clear              "

## 2025-03-21 NOTE — ASSESSMENT & PLAN NOTE
Patient had DVT in 2014, likely provoked, secondary to spinal surgery.  He reports being on Xarelto for a few months.  In June 2024 patient had bilateral pulmonary embolisms, unclear etiology.  He did have what was believed to be pneumonia around the same time.  He denied having any recent provoking factors such as travel, long periods of sitting, or trauma.  The lupus anticoagulant in June was not detected however the dilute viper venom time was elevated.  Comment suggested results are consistent with the specific inhibitors to 1 or more common pathways, factor ask, factor V, factor II or fibrinogen. Beta-2 glycoprotein and cardiolipin antibodies were within normal limits.   Patient had a repeat lupus anticoagulant done in December 2024 that showed no lupus anticoagulant detected.  Dilute viper venom time was normal.  We discussed at length whether he needed further hypercoagulable workup.  Patient states his mother was on Coumadin and had a history of a stroke.  Because patient had 2 separate clotting incidences he should remain on lifelong anticoagulation.  Whether factor V Leiden or prothrombin gene mutation would be positive, will not change our recommendations.  If patient has another thrombotic event on Eliquis that would be considered a failure and Coumadin or Pradaxa could be considered.  I recommend he continue on lifelong anticoagulation as pulmonary had recommended.  Patient does not need to follow with our practice.  We will see him on a as needed basis.  Patient is not up-to-date on colonoscopy, most recent done in 2016.  He does have an appointment with GI later this year.    Orders:    Ambulatory Referral to Hematology / Oncology

## 2025-03-26 DIAGNOSIS — G95.9 MYELOPATHY (HCC): ICD-10-CM

## 2025-03-26 NOTE — TELEPHONE ENCOUNTER
Reason for call:   [x] Refill   [] Prior Auth  [] Other:     Office:   [x] PCP/Provider -  Gui Espinosa, DO   [] Specialty/Provider -     Medication: oxyCODONE (OXY-IR) 5 MG capsule     Dose/Frequency:     5 mg, Oral, 2 times daily PRN       Quantity: 60    Pharmacy: [id number]  JADEN MCKAY - [address] [place] [address] Pharmacy   Does the patient have enough for 3 days?   [x] Yes   [] No - Send as HP to POD    Mail Away Pharmacy   Does the patient have enough for 10 days?   [] Yes   [] No - Send as HP to POD

## 2025-03-27 RX ORDER — OXYCODONE HYDROCHLORIDE 5 MG/1
5 CAPSULE ORAL 2 TIMES DAILY PRN
Qty: 60 CAPSULE | Refills: 0 | Status: SHIPPED | OUTPATIENT
Start: 2025-03-27

## 2025-03-28 ENCOUNTER — TELEPHONE (OUTPATIENT)
Age: 65
End: 2025-03-28

## 2025-03-28 NOTE — TELEPHONE ENCOUNTER
PA for (OXY-IR) 5 MG capsule SUBMITTED to     via    []CMM-KEY:   [x]Surescripts-Case ID # [id number]     []Availity-Auth ID # NDC #   []Faxed to plan   []Other website   []Phone call Case ID #     [x]PA sent as URGENT    All office notes, labs and other pertaining documents and studies sent. Clinical questions answered. Awaiting determination from insurance company.     Turnaround time for your insurance to make a decision on your Prior Authorization can take 7-21 business days.

## 2025-03-31 NOTE — TELEPHONE ENCOUNTER
PA for oxyCODONE (OXY-IR) 5 MG capsule  APPROVED     Date(s) approved January 1, 2025 to March 28, 2026     Case #[id number]       Patient advised by          []Silvigenhart Message  [x]Phone call   []LMOM  []L/M to call office as no active Communication consent on file  []Unable to leave detailed message as VM not approved on Communication consent       Pharmacy advised by    [x]Fax  []Phone call  []Secure Chat       Approval letter scanned into Media Yes

## 2025-04-04 ENCOUNTER — RA CDI HCC (OUTPATIENT)
Dept: OTHER | Facility: HOSPITAL | Age: 65
End: 2025-04-04

## 2025-04-11 ENCOUNTER — APPOINTMENT (OUTPATIENT)
Dept: LAB | Facility: HOSPITAL | Age: 65
End: 2025-04-11
Payer: COMMERCIAL

## 2025-04-11 DIAGNOSIS — E78.2 MIXED HYPERLIPIDEMIA: ICD-10-CM

## 2025-04-11 LAB
ALBUMIN SERPL BCG-MCNC: 4.5 G/DL (ref 3.5–5)
ALP SERPL-CCNC: 56 U/L (ref 34–104)
ALT SERPL W P-5'-P-CCNC: 19 U/L (ref 7–52)
ANION GAP SERPL CALCULATED.3IONS-SCNC: 7 MMOL/L (ref 4–13)
AST SERPL W P-5'-P-CCNC: 20 U/L (ref 13–39)
BASOPHILS # BLD AUTO: 0.08 THOUSANDS/ÂΜL (ref 0–0.1)
BASOPHILS NFR BLD AUTO: 2 % (ref 0–1)
BILIRUB SERPL-MCNC: 0.79 MG/DL (ref 0.2–1)
BUN SERPL-MCNC: 15 MG/DL (ref 5–25)
CALCIUM SERPL-MCNC: 9.4 MG/DL (ref 8.4–10.2)
CHLORIDE SERPL-SCNC: 104 MMOL/L (ref 96–108)
CHOLEST SERPL-MCNC: 220 MG/DL (ref ?–200)
CO2 SERPL-SCNC: 28 MMOL/L (ref 21–32)
CREAT SERPL-MCNC: 1 MG/DL (ref 0.6–1.3)
EOSINOPHIL # BLD AUTO: 0.25 THOUSAND/ÂΜL (ref 0–0.61)
EOSINOPHIL NFR BLD AUTO: 5 % (ref 0–6)
ERYTHROCYTE [DISTWIDTH] IN BLOOD BY AUTOMATED COUNT: 13.6 % (ref 11.6–15.1)
GFR SERPL CREATININE-BSD FRML MDRD: 78 ML/MIN/1.73SQ M
GLUCOSE P FAST SERPL-MCNC: 90 MG/DL (ref 65–99)
HCT VFR BLD AUTO: 42 % (ref 36.5–49.3)
HDLC SERPL-MCNC: 41 MG/DL
HGB BLD-MCNC: 14 G/DL (ref 12–17)
IMM GRANULOCYTES # BLD AUTO: 0.01 THOUSAND/UL (ref 0–0.2)
IMM GRANULOCYTES NFR BLD AUTO: 0 % (ref 0–2)
LDLC SERPL CALC-MCNC: 158 MG/DL (ref 0–100)
LYMPHOCYTES # BLD AUTO: 1.62 THOUSANDS/ÂΜL (ref 0.6–4.47)
LYMPHOCYTES NFR BLD AUTO: 30 % (ref 14–44)
MCH RBC QN AUTO: 30.4 PG (ref 26.8–34.3)
MCHC RBC AUTO-ENTMCNC: 33.3 G/DL (ref 31.4–37.4)
MCV RBC AUTO: 91 FL (ref 82–98)
MONOCYTES # BLD AUTO: 0.54 THOUSAND/ÂΜL (ref 0.17–1.22)
MONOCYTES NFR BLD AUTO: 10 % (ref 4–12)
NEUTROPHILS # BLD AUTO: 2.89 THOUSANDS/ÂΜL (ref 1.85–7.62)
NEUTS SEG NFR BLD AUTO: 53 % (ref 43–75)
NRBC BLD AUTO-RTO: 0 /100 WBCS
PLATELET # BLD AUTO: 232 THOUSANDS/UL (ref 149–390)
PMV BLD AUTO: 10.3 FL (ref 8.9–12.7)
POTASSIUM SERPL-SCNC: 4.5 MMOL/L (ref 3.5–5.3)
PROT SERPL-MCNC: 7.2 G/DL (ref 6.4–8.4)
RBC # BLD AUTO: 4.61 MILLION/UL (ref 3.88–5.62)
SODIUM SERPL-SCNC: 139 MMOL/L (ref 135–147)
TRIGL SERPL-MCNC: 105 MG/DL (ref ?–150)
WBC # BLD AUTO: 5.39 THOUSAND/UL (ref 4.31–10.16)

## 2025-04-11 PROCEDURE — 80061 LIPID PANEL: CPT

## 2025-04-11 PROCEDURE — 85025 COMPLETE CBC W/AUTO DIFF WBC: CPT

## 2025-04-11 PROCEDURE — 36415 COLL VENOUS BLD VENIPUNCTURE: CPT

## 2025-04-11 PROCEDURE — 80053 COMPREHEN METABOLIC PANEL: CPT

## 2025-04-14 ENCOUNTER — RESULTS FOLLOW-UP (OUTPATIENT)
Dept: FAMILY MEDICINE CLINIC | Facility: CLINIC | Age: 65
End: 2025-04-14

## 2025-04-14 ENCOUNTER — OFFICE VISIT (OUTPATIENT)
Dept: FAMILY MEDICINE CLINIC | Facility: CLINIC | Age: 65
End: 2025-04-14
Payer: COMMERCIAL

## 2025-04-14 ENCOUNTER — APPOINTMENT (OUTPATIENT)
Dept: RADIOLOGY | Facility: CLINIC | Age: 65
End: 2025-04-14
Payer: COMMERCIAL

## 2025-04-14 VITALS
OXYGEN SATURATION: 98 % | DIASTOLIC BLOOD PRESSURE: 82 MMHG | TEMPERATURE: 97.2 F | HEART RATE: 73 BPM | HEIGHT: 70 IN | WEIGHT: 202.4 LBS | SYSTOLIC BLOOD PRESSURE: 120 MMHG | BODY MASS INDEX: 28.98 KG/M2

## 2025-04-14 DIAGNOSIS — R76.0 LUPUS ANTICOAGULANT POSITIVE: ICD-10-CM

## 2025-04-14 DIAGNOSIS — E78.2 MIXED HYPERLIPIDEMIA: ICD-10-CM

## 2025-04-14 DIAGNOSIS — R07.81 PLEURODYNIA: ICD-10-CM

## 2025-04-14 DIAGNOSIS — Z86.711 PERSONAL HISTORY OF PULMONARY EMBOLISM: ICD-10-CM

## 2025-04-14 DIAGNOSIS — R10.2 PELVIC PAIN: Primary | ICD-10-CM

## 2025-04-14 DIAGNOSIS — G95.89 MYELOMALACIA OF CERVICAL CORD (HCC): ICD-10-CM

## 2025-04-14 DIAGNOSIS — N40.0 BPH WITHOUT OBSTRUCTION/LOWER URINARY TRACT SYMPTOMS: ICD-10-CM

## 2025-04-14 DIAGNOSIS — R73.9 ELEVATED BLOOD SUGAR: ICD-10-CM

## 2025-04-14 DIAGNOSIS — M19.90 ARTHRITIS: ICD-10-CM

## 2025-04-14 PROCEDURE — 71046 X-RAY EXAM CHEST 2 VIEWS: CPT

## 2025-04-14 PROCEDURE — G2211 COMPLEX E/M VISIT ADD ON: HCPCS | Performed by: INTERNAL MEDICINE

## 2025-04-14 PROCEDURE — 99214 OFFICE O/P EST MOD 30 MIN: CPT | Performed by: INTERNAL MEDICINE

## 2025-04-14 NOTE — ASSESSMENT & PLAN NOTE
Follows with urology.  CT of the abdomen pelvis ordered due to this discomfort he is experiencing.  Status post TUR.

## 2025-04-14 NOTE — PROGRESS NOTES
Name: Vini Ybarra      : 1960      MRN: [id number]  Encounter Provider: Gui Espinosa DO  Encounter Date: 2025   Encounter department: [facility]  :  Assessment & Plan  Pelvic pain  Uncertain cause of his pelvic pain.  Will proceed with CT of the abdomen pelvis  Orders:    CT chest w contrast abdomen and pelvis wo; Future    Pleurodynia  Mostly on the left side inspiratory pain.  Will recheck chest x-ray, consider repeat CT of his chest.  Does not appear to be cardiac.  Orders:    XR chest pa and lateral; Future    Lupus anticoagulant positive  Remains on anticoagulation.       Personal history of pulmonary embolism  Remains on long-term preventative anticoagulation with Eliquis 2.5 mg twice daily.       BPH without obstruction/lower urinary tract symptoms  Follows with urology.  CT of the abdomen pelvis ordered due to this discomfort he is experiencing.  Status post TUR.       Elevated blood sugar  Labs due in 6 months  Orders:    CBC and differential; Future    Comprehensive metabolic panel; Future    Hemoglobin A1C; Future    Mixed hyperlipidemia  Wants to hold off on statins at this point.  Risk reduction would go from 14.9-6.9, total of 5 percentage points this is the worst his cholesterol has been so we will diet and exercise over the summer and we will give this another conversation with his next labs.  Orders:    Lipid Panel with Direct LDL reflex; Future    Myelomalacia of cervical cord (HCC)  Chronic issue, struggles with daily pain.       Arthritis  Unable to take anti-inflammatories, takes Tylenol and his oxycodone at this time.              History of Present Illness   Patient here 2 complaints.  First being pelvic pain.  Describes when he coughs or bears down, he is discomfort around his bladder area.  Feels like goes right through his penis at times.  He has a history of a TUR and a catheter at that time.  He said to be urine dips both of which were negative  "for the symptoms.  He has no fever no chills no sweats.  Denies nausea or vomiting associate with this.  It is intermittent in nature.    Second complaint is left-sided chest pain.  Describes it is mostly when he takes a deep inspiration.  Noted that ever since he had pneumonia this is what is occurring.  He has no shortness of breath associated with it.  No recent fevers.  Recent influenza A noted.    In his past he has history of significant pulmonary embolism.  While was thought possibly provoked, he appears to have thrombophilia and is now on Eliquis at 2.5 mg twice daily preventatively.    Reviewed recent blood work.      Review of Systems   Constitutional:  Negative for chills and fever.   HENT:  Positive for congestion. Negative for rhinorrhea and sore throat.    Eyes:  Negative for visual disturbance.   Respiratory:  Negative for cough and shortness of breath.    Cardiovascular:  Negative for chest pain and leg swelling.   Gastrointestinal:  Positive for abdominal pain. Negative for diarrhea, nausea and vomiting.        Pelvic pain when he coughs   Genitourinary:  Negative for dysuria.   Musculoskeletal:  Positive for arthralgias and back pain. Negative for myalgias.   Skin:  Negative for rash.   Neurological:  Negative for dizziness and headaches.   Psychiatric/Behavioral:  Negative for confusion.    All other systems reviewed and are negative.      Objective   /82 (BP Location: Left arm, Patient Position: Sitting, Cuff Size: Large)   Pulse 73   Temp (!) 97.2 °F (36.2 °C) (Tympanic)   Ht 5' 10\" (1.778 m)   Wt 91.8 kg (202 lb 6.4 oz)   SpO2 98%   BMI 29.04 kg/m²      Physical Exam  Vitals and nursing note reviewed.   Constitutional:       General: He is not in acute distress.     Appearance: Normal appearance. He is well-developed.   HENT:      Head: Normocephalic and atraumatic.   Eyes:      Conjunctiva/sclera: Conjunctivae normal.   Cardiovascular:      Rate and Rhythm: Normal rate and regular " rhythm.      Pulses: Normal pulses.      Heart sounds: Normal heart sounds. No murmur heard.  Pulmonary:      Effort: Pulmonary effort is normal. No respiratory distress.      Breath sounds: Normal breath sounds. No wheezing or rales.   Chest:      Chest wall: No tenderness.   Abdominal:      Palpations: Abdomen is soft.      Tenderness: There is no abdominal tenderness.   Musculoskeletal:         General: No swelling.      Cervical back: Neck supple.   Skin:     General: Skin is warm and dry.      Capillary Refill: Capillary refill takes less than 2 seconds.   Neurological:      General: No focal deficit present.      Mental Status: He is alert and oriented to person, place, and time.   Psychiatric:         Mood and Affect: Mood normal.

## 2025-04-22 ENCOUNTER — TELEPHONE (OUTPATIENT)
Age: 65
End: 2025-04-22

## 2025-04-22 NOTE — TELEPHONE ENCOUNTER
CARLOS Rosa    04/14/25 CXR    Pt asking you to review CXR and advise. Pt's PCP ordered a CT Chest/ABD/Pelvis, pt completing 04/25/25.

## 2025-04-22 NOTE — TELEPHONE ENCOUNTER
CARLOS Rosa    L Lower Rib (bottom rib area)    Advised pt as per 04/25/25 1035 CARLOS Rosa notation. Reviewed atelectasis and breathing techniques to help open lungs/air sacs. Pt now stating he is having L Lower Rib (bottom of rib area). Pt states that is why his PCP did CXR. Pt states PCP ordered CT Chest to follow up on L Lower Rib (bottom of rib area) and ABD/PELVIS pain. Pt states only having pain off/on, no SOB. Advised I will alert provider and notify him if provider has any further recommendations other than follow through with CT as ordered by PCP. Advised to call back if he develops SOB and/or difficulty with breathing.    Patient had no further questions and/or concerns at this time.

## 2025-04-25 ENCOUNTER — HOSPITAL ENCOUNTER (OUTPATIENT)
Dept: CT IMAGING | Facility: HOSPITAL | Age: 65
Discharge: HOME | End: 2025-04-25
Attending: INTERNAL MEDICINE
Payer: COMMERCIAL

## 2025-04-25 DIAGNOSIS — R10.2 PELVIC PAIN: ICD-10-CM

## 2025-04-25 PROCEDURE — 74176 CT ABD & PELVIS W/O CONTRAST: CPT

## 2025-04-25 PROCEDURE — 71260 CT THORAX DX C+: CPT

## 2025-04-25 RX ADMIN — IOHEXOL 85 ML: 350 INJECTION, SOLUTION INTRAVENOUS at 13:51

## 2025-05-01 ENCOUNTER — RESULTS FOLLOW-UP (OUTPATIENT)
Dept: FAMILY MEDICINE CLINIC | Facility: CLINIC | Age: 65
End: 2025-05-01

## 2025-05-01 DIAGNOSIS — R10.2 PELVIC PAIN: Primary | ICD-10-CM

## 2025-05-01 DIAGNOSIS — K40.90 NON-RECURRENT INGUINAL HERNIA WITHOUT OBSTRUCTION OR GANGRENE, UNSPECIFIED LATERALITY: ICD-10-CM

## 2025-05-01 DIAGNOSIS — R93.89 ABNORMAL CAT SCAN: Primary | ICD-10-CM

## 2025-05-14 ENCOUNTER — TELEPHONE (OUTPATIENT)
Age: 65
End: 2025-05-14

## 2025-05-14 NOTE — TELEPHONE ENCOUNTER
I reviewed patient's CT findings which shows diffuse bladder wall thickening which is likely due to chronic BPH. Cannot exclude possible infection. If he is having bothersome urinary symptoms concerning for UTI then I would recommend urine testing otherwise CT did not show any acute findings from Urologic standpoint.

## 2025-05-14 NOTE — TELEPHONE ENCOUNTER
Call placed to patient, however there was no answer and voicemail box is full and unable to accept any messages at this time. If patient returns call, please relay LILIANA Mendieta's message below.

## 2025-05-14 NOTE — TELEPHONE ENCOUNTER
Patient called to inform Dr Rodrigues that he had a CT scan of abd and pelvis done 4/25/25.  Patient stated his PCP ordered it but he just wanted to let Dr Rodrigues aware.

## 2025-05-15 NOTE — TELEPHONE ENCOUNTER
Contacted patient and reviewed LILIANA Mendieta's note regarding CT abdomen pelvis results. Patient denies any s/s of infection at this time. Advised patient to follow up as scheduled.

## 2025-05-19 ENCOUNTER — CONSULT (OUTPATIENT)
Dept: SURGERY | Facility: CLINIC | Age: 65
End: 2025-05-19
Attending: INTERNAL MEDICINE
Payer: COMMERCIAL

## 2025-05-19 VITALS
TEMPERATURE: 97.6 F | WEIGHT: 199.8 LBS | HEIGHT: 70 IN | OXYGEN SATURATION: 98 % | DIASTOLIC BLOOD PRESSURE: 78 MMHG | SYSTOLIC BLOOD PRESSURE: 138 MMHG | BODY MASS INDEX: 28.6 KG/M2 | HEART RATE: 73 BPM

## 2025-05-19 DIAGNOSIS — K40.90 RIGHT INGUINAL HERNIA: Primary | ICD-10-CM

## 2025-05-19 DIAGNOSIS — K42.9 UMBILICAL HERNIA WITHOUT OBSTRUCTION AND WITHOUT GANGRENE: ICD-10-CM

## 2025-05-19 DIAGNOSIS — R76.0 LUPUS ANTICOAGULANT POSITIVE: ICD-10-CM

## 2025-05-19 PROCEDURE — 99204 OFFICE O/P NEW MOD 45 MIN: CPT | Performed by: SURGERY

## 2025-05-19 RX ORDER — HEPARIN SODIUM 5000 [USP'U]/ML
5000 INJECTION, SOLUTION INTRAVENOUS; SUBCUTANEOUS ONCE
OUTPATIENT
Start: 2025-05-19 | End: 2025-05-19

## 2025-05-19 RX ORDER — SODIUM CHLORIDE, SODIUM LACTATE, POTASSIUM CHLORIDE, CALCIUM CHLORIDE 600; 310; 30; 20 MG/100ML; MG/100ML; MG/100ML; MG/100ML
125 INJECTION, SOLUTION INTRAVENOUS CONTINUOUS
OUTPATIENT
Start: 2025-05-19

## 2025-05-19 NOTE — PROGRESS NOTES
Name: Vini Ybarra      : 1960      MRN: [id number]  Encounter Provider: Alfonso Barr DO  Encounter Date: 2025   Encounter department: [facility]  :  Assessment & Plan  Right inguinal hernia  Patient s/p distant L inguinal hernia repair.  Postoperative course uncomplicated.  Referred to general surgery for evaluation of right inguinal hernia.  Moderate sized fat-containing right inguinal hernia and small fat-containing umbilical hernia observed on CT scan from 2025.  Describes worsening swelling and discomfort of right groin since the winter due to aggravation from his work.  Exacerbated by heavy lifting and coughing.    I had a lengthy conversation with the patient regarding procedural steps, risks of operation, and alternatives to intervention after which he was agreeable to pursue with the operation.  Electronic consent obtained for right inguinal hernia repair with mesh & umbilical hernia repair with or without mesh.    Orders:    Case request operating room: REPAIR HERNIA INGUINAL w/ MESH, REPAIR HERNIA UMBILICAL w/ or w/o MESH; Standing    Plan:  -patient to schedule outpatient surgery per their schedule  -electronic consent obtained in clinic  -return precautions regarding inguinal hernia (incarceration, strangulation) discussed with patient who verbally confirmed understanding with teach back method  -continue supportive management  -minimize lifting heavy weight between now and scheduled operation  Lupus anticoagulant positive  Patient with history of pulmonary embolism.  Hypercoagulability stable on home dose of Eliquis. Most recent ECHO (2024) with LVEF=55% with normal systolic and diastolic function without wall motion abnormality.     -patient to hold eliquis 2 days prior to surgery  -to resume post-operatively  Umbilical hernia without obstruction and without gangrene  Patient reports tenderness with palpation of small umbilical hernia however no  "reported baseline pain.  Orders:    Case request operating room: REPAIR HERNIA INGUINAL w/ MESH, REPAIR HERNIA UMBILICAL w/ or w/o MESH; Standing    Plan:  -see inguinal hernia plan above    Consulting Provider: Gui Espinosa DO  [facility] [facility]  [place] [address]  Primary Care Physician: Gui Espinosa DO    This plan was discussed with the attending provider, Dr. Barr. Thank you for referring Vini Ybarra to our office. Should any questions arise regarding this visit please reach back out to our office staff. Thank you for including us in the medical care of this patient.    Ramírez Diaz MD  General Surgery  05/19/25  4:20 PM      History of Present Illness   HPI  Vini Ybarra is a 64 y.o. male w/ PMHx of L inguinal hernia s/p distant open repair with mesh, subclinical PEs (treated with Eliquis), GERD, Cervical spine fusion, and BPH s/p TURP (2/28/2024) who presents for evaluation of R inguinal hernia.  Patient reports having a previous left groin hernia which had been repaired with mesh distantly and no postoperative complications.  Interestingly, patient reported having a left orchiectomy in childhood for some unreported reason.  His left groin incision healed well and does not give him any issues.  He works at a [employer] and is \"very active\" constantly lifting up heavy weighed objects.  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  He denies a previous incidence of groin firmness or persistent pain which did not resolve.  Patient reports that his discomfort usually worsens throughout the day and after strenuous activities.  Denies nausea, vomiting, acute GI upset, or history of small bowel obstruction.  No previous bowel surgeries endorsed.  Denies tobacco use, or illicit drug use.  Endorses occasional alcohol use socially.  He is on a blood thinner Eliquis for his history of " multiple pulmonary embolisms incidentally found on CT scans.  Most recent echo with normal systolic function and LVEF=55%.  No chest pains, headaches, or shortness of breath.  ROS as below. Interested in surgical management of R groin surgery, and simultaneous repair of umbilical hernia with or without mesh (as clinically indicated).  History obtained from: patient    Lab Work:  Reviewed most recent lab work (CMP, CBC, Lipid Panel) dated 4/11/.     Imaging:  Reviewed CT Abdomen/Pelvis dated 4/25/2025 which had the following impression:  1.  Moderate size fat-containing right inguinal hernia. Small fat-containing umbilical hernia.  2.  There is diffuse urinary bladder wall thickening. While this may be partly due to under distention, the appearance can also be seen in the setting of a chronic bladder outlet obstruction and/or cystitis.  3.  Gallbladder fundal adenomyomatosis.  4.  Colonic diverticulosis.    Review of Systems   Constitutional:  Negative for activity change, appetite change, chills, fatigue and fever.   HENT:  Negative for ear pain and sore throat.    Eyes: Negative.    Respiratory:  Negative for cough and shortness of breath.    Cardiovascular:  Negative for chest pain and palpitations.   Gastrointestinal:  Negative for abdominal distention, abdominal pain, constipation, diarrhea, nausea and vomiting.   Endocrine: Negative.    Genitourinary:  Positive for scrotal swelling (& R groin). Negative for difficulty urinating, dysuria, flank pain, frequency, hematuria, penile pain, testicular pain and urgency.   Musculoskeletal:  Negative for arthralgias and back pain.   Skin:  Negative for color change and rash.   Allergic/Immunologic: Negative.    Neurological:  Negative for seizures and syncope.   Hematological: Negative.    Psychiatric/Behavioral: Negative.     All other systems reviewed and are negative.    Medical History Reviewed by provider this encounter:     .  Past Medical History   Past Medical  History:   Diagnosis Date    Arthritis     Benign prostatic hyperplasia with incomplete bladder emptying 02/28/2024    Bilateral pulmonary embolism (HCC) 06/23/2024    Chronic pain disorder     COVID 2021    GERD (gastroesophageal reflux disease)     Spinal cord dysplasia (HCC)      Past Surgical History:   Procedure Laterality Date    CERVICAL FUSION      EGD      HERNIA REPAIR Left     NASAL SEPTOPLASTY W/ TURBINOPLASTY  08/22/2003    [place] BX PROSTATE STRTCTC SATURATION SAMPLING IMG GID N/A 11/22/2023    Procedure: TRANSPERINEAL ULTRASOUND GUIDED BIOPSY PROSTATE;  Surgeon: Luis Antonio Rodrigues MD;  Location: [facility];  Service: Urology    [place] TRURL ELECTROSURG RESCJ PROSTATE BLEED COMPLETE N/A 2/28/2024    Procedure: TRANSURETHRAL RESECTION OF PROSTATE (TURP);  Surgeon: Luis Antonio Rodrigues MD;  Location: [facility];  Service: Urology    TOOTH EXTRACTION       Family History   Problem Relation Age of Onset    Cancer Father     Lung cancer Brother     Cancer Son       reports that he has quit smoking. His smoking use included cigarettes. He started smoking about 51 years ago. He has a 20 pack-year smoking history. He has been exposed to tobacco smoke. He has never used smokeless tobacco. He reports current alcohol use. He reports that he does not currently use drugs.  Current Outpatient Medications   Medication Instructions    albuterol (Ventolin HFA) 90 mcg/act inhaler 2 puffs, Inhalation, Every 6 hours PRN    apixaban (ELIQUIS) 2.5 mg, Oral, 2 times daily    cholecalciferol (VITAMIN D3) 1,000 Units, Daily    gabapentin (NEURONTIN) 300 mg, 3 times daily PRN    ketoconazole (NIZORAL) 2 % cream     naproxen (NAPROSYN) 500 mg, Oral, 2 times daily with meals    omeprazole (PRILOSEC) 20 mg, Oral, Daily    oxyCODONE (OXY-IR) 5 mg, Oral, 2 times daily PRN    rosuvastatin (CRESTOR) 5 mg, Oral, Daily   Allergies[1]   Medications Ordered Prior to Encounter[2]   Social History     Tobacco Use    Smoking status: Former     Average packs/day:  "0.5 packs/day for 40.0 years (20.0 ttl pk-yrs)     Types: Cigarettes     Start date: 1974     Passive exposure: Past    Smokeless tobacco: Never   Vaping Use    Vaping status: Never Used   Substance and Sexual Activity    Alcohol use: Yes     Comment: couple beers a week    Drug use: Not Currently    Sexual activity: Not Currently        Objective   /78 (BP Location: Left arm, Patient Position: Sitting, Cuff Size: Standard)   Pulse 73   Temp 97.6 °F (36.4 °C) (Temporal)   Ht 5' 10\" (1.778 m)   Wt 90.6 kg (199 lb 12.8 oz)   SpO2 98%   BMI 28.67 kg/m²      Physical Exam  Vitals and nursing note reviewed.   Constitutional:       General: He is not in acute distress.     Appearance: Normal appearance. He is well-developed. He is not ill-appearing.   HENT:      Head: Normocephalic and atraumatic.      Right Ear: External ear normal.      Left Ear: External ear normal.      Nose: Nose normal.      Mouth/Throat:      Mouth: Mucous membranes are moist.     Eyes:      General: No scleral icterus.     Extraocular Movements: Extraocular movements intact.      Conjunctiva/sclera: Conjunctivae normal.       Cardiovascular:      Rate and Rhythm: Normal rate.      Heart sounds: No murmur heard.  Pulmonary:      Effort: Pulmonary effort is normal. No respiratory distress.      Breath sounds: Normal breath sounds. No wheezing.   Abdominal:      General: Abdomen is flat. There is no distension.      Palpations: Abdomen is soft.      Tenderness: There is no abdominal tenderness. There is no guarding or rebound.      Hernia: A hernia (R inguinal & umbilical) is present.      Comments: Well-healed LLQ inguinal hernia repair incision   Genitourinary:     Penis: Normal.       Comments: Absent L testicle; normal R testicle    Musculoskeletal:         General: No swelling.      Cervical back: Neck supple. No tenderness.     Skin:     General: Skin is warm and dry.      Capillary Refill: Capillary refill takes less than 2 " seconds.     Neurological:      General: No focal deficit present.      Mental Status: He is alert and oriented to person, place, and time. Mental status is at baseline.     Psychiatric:         Mood and Affect: Mood normal.         Behavior: Behavior normal.         Thought Content: Thought content normal.         Judgment: Judgment normal.            [1]   Allergies  Allergen Reactions    Acetaminophen Other (See Comments)     Other reaction(s): TONGUE LIPS NOSE TINGLING SENSAT     [2]   Current Outpatient Medications on File Prior to Visit   Medication Sig Dispense Refill    albuterol (Ventolin HFA) 90 mcg/act inhaler Inhale 2 puffs every 6 (six) hours as needed for wheezing 18 g 5    apixaban (ELIQUIS) 2.5 mg Take 1 tablet (2.5 mg total) by mouth 2 (two) times a day 60 tablet 5    cholecalciferol (VITAMIN D3) 1,000 units tablet Take 1,000 Units by mouth in the morning.      gabapentin (NEURONTIN) 300 mg capsule Take 300 mg by mouth 3 (three) times a day as needed      ketoconazole (NIZORAL) 2 % cream       omeprazole (PriLOSEC) 20 mg delayed release capsule take 1 capsule by mouth once daily 100 capsule 1    oxyCODONE (OXY-IR) 5 MG capsule Take 1 capsule (5 mg total) by mouth 2 (two) times a day as needed for moderate pain Max Daily Amount: 10 mg 60 capsule 0    rosuvastatin (CRESTOR) 5 mg tablet Take 1 tablet (5 mg total) by mouth daily 90 tablet 3    naproxen (Naprosyn) 500 mg tablet Take 1 tablet (500 mg total) by mouth 2 (two) times a day with meals for 5 days 10 tablet 0     No current facility-administered medications on file prior to visit.

## 2025-05-19 NOTE — ASSESSMENT & PLAN NOTE
Patient with history of pulmonary embolism.  Hypercoagulability stable on home dose of Eliquis. Most recent ECHO (6/24/2024) with LVEF=55% with normal systolic and diastolic function without wall motion abnormality.     -patient to hold eliquis 2 days prior to surgery  -to resume post-operatively

## 2025-05-19 NOTE — ASSESSMENT & PLAN NOTE
Patient s/p distant L inguinal hernia repair.  Postoperative course uncomplicated.  Referred to general surgery for evaluation of right inguinal hernia.  Moderate sized fat-containing right inguinal hernia and small fat-containing umbilical hernia observed on CT scan from 4/25/2025.  Describes worsening swelling and discomfort of right groin since the winter due to aggravation from his work.  Exacerbated by heavy lifting and coughing.    I had a lengthy conversation with the patient regarding procedural steps, risks of operation, and alternatives to intervention after which he was agreeable to pursue with the operation.  Electronic consent obtained for right inguinal hernia repair with mesh & umbilical hernia repair with or without mesh.    Orders:    Case request operating room: REPAIR HERNIA INGUINAL w/ MESH, REPAIR HERNIA UMBILICAL w/ or w/o MESH; Standing    Plan:  -patient to schedule outpatient surgery per their schedule  -electronic consent obtained in clinic  -return precautions regarding inguinal hernia (incarceration, strangulation) discussed with patient who verbally confirmed understanding with teach back method  -continue supportive management  -minimize lifting heavy weight between now and scheduled operation

## 2025-05-19 NOTE — H&P
Name: Vini Ybarra      : 1960      MRN: [id number]  Encounter Provider: Alfonso Barr DO  Encounter Date: 2025   Encounter department: [facility]  :  Assessment & Plan  Right inguinal hernia  Patient s/p distant L inguinal hernia repair.  Postoperative course uncomplicated.  Referred to general surgery for evaluation of right inguinal hernia.  Moderate sized fat-containing right inguinal hernia and small fat-containing umbilical hernia observed on CT scan from 2025.  Describes worsening swelling and discomfort of right groin since the winter due to aggravation from his work.  Exacerbated by heavy lifting and coughing.    I had a lengthy conversation with the patient regarding procedural steps, risks of operation, and alternatives to intervention after which he was agreeable to pursue with the operation.  Electronic consent obtained for right inguinal hernia repair with mesh & umbilical hernia repair with or without mesh.    Orders:    Case request operating room: REPAIR HERNIA INGUINAL w/ MESH, REPAIR HERNIA UMBILICAL w/ or w/o MESH; Standing    Plan:  -patient to schedule outpatient surgery per their schedule  -electronic consent obtained in clinic  -return precautions regarding inguinal hernia (incarceration, strangulation) discussed with patient who verbally confirmed understanding with teach back method  -continue supportive management  -minimize lifting heavy weight between now and scheduled operation  Lupus anticoagulant positive  Patient with history of pulmonary embolism.  Hypercoagulability stable on home dose of Eliquis. Most recent ECHO (2024) with LVEF=55% with normal systolic and diastolic function without wall motion abnormality.     -patient to hold eliquis 2 days prior to surgery  -to resume post-operatively  Umbilical hernia without obstruction and without gangrene  Patient reports tenderness with palpation of small umbilical hernia however no  "reported baseline pain.  Orders:    Case request operating room: REPAIR HERNIA INGUINAL w/ MESH, REPAIR HERNIA UMBILICAL w/ or w/o MESH; Standing    Plan:  -see inguinal hernia plan above    Consulting Provider: Gui Espinosa DO  [facility] [facility]  [place] [address]  Primary Care Physician: Gui Espinosa DO    This plan was discussed with the attending provider, Dr. Barr. Thank you for referring Vini Ybarra to our office. Should any questions arise regarding this visit please reach back out to our office staff. Thank you for including us in the medical care of this patient.    Ramírez Diaz MD  General Surgery  05/19/25  4:20 PM      History of Present Illness  HPI  Vini Ybarra is a 64 y.o. male w/ PMHx of L inguinal hernia s/p distant open repair with mesh, subclinical PEs (treated with Eliquis), GERD, Cervical spine fusion, and BPH s/p TURP (2/28/2024) who presents for evaluation of R inguinal hernia.  Patient reports having a previous left groin hernia which had been repaired with mesh distantly and no postoperative complications.  Interestingly, patient reported having a left orchiectomy in childhood for some unreported reason.  His left groin incision healed well and does not give him any issues.  He works at a [employer] and is \"very active\" constantly lifting up heavy weighed objects.  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  He denies a previous incidence of groin firmness or persistent pain which did not resolve.  Patient reports that his discomfort usually worsens throughout the day and after strenuous activities.  Denies nausea, vomiting, acute GI upset, or history of small bowel obstruction.  No previous bowel surgeries endorsed.  Denies tobacco use, or illicit drug use.  Endorses occasional alcohol use socially.  He is on a blood thinner Eliquis for his history of " multiple pulmonary embolisms incidentally found on CT scans.  Most recent echo with normal systolic function and LVEF=55%.  No chest pains, headaches, or shortness of breath.  ROS as below. Interested in surgical management of R groin surgery, and simultaneous repair of umbilical hernia with or without mesh (as clinically indicated).  History obtained from: patient    Lab Work:  Reviewed most recent lab work (CMP, CBC, Lipid Panel) dated 4/11/.     Imaging:  Reviewed CT Abdomen/Pelvis dated 4/25/2025 which had the following impression:  1.  Moderate size fat-containing right inguinal hernia. Small fat-containing umbilical hernia.  2.  There is diffuse urinary bladder wall thickening. While this may be partly due to under distention, the appearance can also be seen in the setting of a chronic bladder outlet obstruction and/or cystitis.  3.  Gallbladder fundal adenomyomatosis.  4.  Colonic diverticulosis.    Review of Systems   Constitutional:  Negative for activity change, appetite change, chills, fatigue and fever.   HENT:  Negative for ear pain and sore throat.    Eyes: Negative.    Respiratory:  Negative for cough and shortness of breath.    Cardiovascular:  Negative for chest pain and palpitations.   Gastrointestinal:  Negative for abdominal distention, abdominal pain, constipation, diarrhea, nausea and vomiting.   Endocrine: Negative.    Genitourinary:  Positive for scrotal swelling (& R groin). Negative for difficulty urinating, dysuria, flank pain, frequency, hematuria, penile pain, testicular pain and urgency.   Musculoskeletal:  Negative for arthralgias and back pain.   Skin:  Negative for color change and rash.   Allergic/Immunologic: Negative.    Neurological:  Negative for seizures and syncope.   Hematological: Negative.    Psychiatric/Behavioral: Negative.     All other systems reviewed and are negative.    Medical History Reviewed by provider this encounter:     .  Past Medical History  Past Medical  History:   Diagnosis Date    Arthritis     Benign prostatic hyperplasia with incomplete bladder emptying 02/28/2024    Bilateral pulmonary embolism (HCC) 06/23/2024    Chronic pain disorder     COVID 2021    GERD (gastroesophageal reflux disease)     Spinal cord dysplasia (HCC)      Past Surgical History:   Procedure Laterality Date    CERVICAL FUSION      EGD      HERNIA REPAIR Left     NASAL SEPTOPLASTY W/ TURBINOPLASTY  08/22/2003    MO BX PROSTATE STRTCTC SATURATION SAMPLING IMG GID N/A 11/22/2023    Procedure: TRANSPERINEAL ULTRASOUND GUIDED BIOPSY PROSTATE;  Surgeon: Luis Antonio Rodrigues MD;  Location: [facility];  Service: Urology    MO TRURL ELECTROSURG RESCJ PROSTATE BLEED COMPLETE N/A 2/28/2024    Procedure: TRANSURETHRAL RESECTION OF PROSTATE (TURP);  Surgeon: Luis Antonio Rodrigues MD;  Location: [facility];  Service: Urology    TOOTH EXTRACTION       Family History   Problem Relation Age of Onset    Cancer Father     Lung cancer Brother     Cancer Son       reports that he has quit smoking. His smoking use included cigarettes. He started smoking about 51 years ago. He has a 20 pack-year smoking history. He has been exposed to tobacco smoke. He has never used smokeless tobacco. He reports current alcohol use. He reports that he does not currently use drugs.  Current Outpatient Medications   Medication Instructions    albuterol (Ventolin HFA) 90 mcg/act inhaler 2 puffs, Inhalation, Every 6 hours PRN    apixaban (ELIQUIS) 2.5 mg, Oral, 2 times daily    cholecalciferol (VITAMIN D3) 1,000 Units, Daily    gabapentin (NEURONTIN) 300 mg, 3 times daily PRN    ketoconazole (NIZORAL) 2 % cream     naproxen (NAPROSYN) 500 mg, Oral, 2 times daily with meals    omeprazole (PRILOSEC) 20 mg, Oral, Daily    oxyCODONE (OXY-IR) 5 mg, Oral, 2 times daily PRN    rosuvastatin (CRESTOR) 5 mg, Oral, Daily   Allergies[1]   Medications Ordered Prior to Encounter[2]   Social History     Tobacco Use    Smoking status: Former     Average packs/day:  "0.5 packs/day for 40.0 years (20.0 ttl pk-yrs)     Types: Cigarettes     Start date: 1974     Passive exposure: Past    Smokeless tobacco: Never   Vaping Use    Vaping status: Never Used   Substance and Sexual Activity    Alcohol use: Yes     Comment: couple beers a week    Drug use: Not Currently    Sexual activity: Not Currently        Objective  /78 (BP Location: Left arm, Patient Position: Sitting, Cuff Size: Standard)   Pulse 73   Temp 97.6 °F (36.4 °C) (Temporal)   Ht 5' 10\" (1.778 m)   Wt 90.6 kg (199 lb 12.8 oz)   SpO2 98%   BMI 28.67 kg/m²      Physical Exam  Vitals and nursing note reviewed.   Constitutional:       General: He is not in acute distress.     Appearance: Normal appearance. He is well-developed. He is not ill-appearing.   HENT:      Head: Normocephalic and atraumatic.      Right Ear: External ear normal.      Left Ear: External ear normal.      Nose: Nose normal.      Mouth/Throat:      Mouth: Mucous membranes are moist.     Eyes:      General: No scleral icterus.     Extraocular Movements: Extraocular movements intact.      Conjunctiva/sclera: Conjunctivae normal.       Cardiovascular:      Rate and Rhythm: Normal rate.      Heart sounds: No murmur heard.  Pulmonary:      Effort: Pulmonary effort is normal. No respiratory distress.      Breath sounds: Normal breath sounds. No wheezing.   Abdominal:      General: Abdomen is flat. There is no distension.      Palpations: Abdomen is soft.      Tenderness: There is no abdominal tenderness. There is no guarding or rebound.      Hernia: A hernia (R inguinal & umbilical) is present.      Comments: Well-healed LLQ inguinal hernia repair incision   Genitourinary:     Penis: Normal.       Comments: Absent L testicle; normal R testicle    Musculoskeletal:         General: No swelling.      Cervical back: Neck supple. No tenderness.     Skin:     General: Skin is warm and dry.      Capillary Refill: Capillary refill takes less than 2 seconds. "     Neurological:      General: No focal deficit present.      Mental Status: He is alert and oriented to person, place, and time. Mental status is at baseline.     Psychiatric:         Mood and Affect: Mood normal.         Behavior: Behavior normal.         Thought Content: Thought content normal.         Judgment: Judgment normal.            [1]   Allergies  Allergen Reactions    Acetaminophen Other (See Comments)     Other reaction(s): TONGUE LIPS NOSE TINGLING SENSAT     [2]   Current Outpatient Medications on File Prior to Visit   Medication Sig Dispense Refill    albuterol (Ventolin HFA) 90 mcg/act inhaler Inhale 2 puffs every 6 (six) hours as needed for wheezing 18 g 5    apixaban (ELIQUIS) 2.5 mg Take 1 tablet (2.5 mg total) by mouth 2 (two) times a day 60 tablet 5    cholecalciferol (VITAMIN D3) 1,000 units tablet Take 1,000 Units by mouth in the morning.      gabapentin (NEURONTIN) 300 mg capsule Take 300 mg by mouth 3 (three) times a day as needed      ketoconazole (NIZORAL) 2 % cream       omeprazole (PriLOSEC) 20 mg delayed release capsule take 1 capsule by mouth once daily 100 capsule 1    oxyCODONE (OXY-IR) 5 MG capsule Take 1 capsule (5 mg total) by mouth 2 (two) times a day as needed for moderate pain Max Daily Amount: 10 mg 60 capsule 0    rosuvastatin (CRESTOR) 5 mg tablet Take 1 tablet (5 mg total) by mouth daily 90 tablet 3    naproxen (Naprosyn) 500 mg tablet Take 1 tablet (500 mg total) by mouth 2 (two) times a day with meals for 5 days 10 tablet 0     No current facility-administered medications on file prior to visit.

## 2025-05-20 ENCOUNTER — TELEPHONE (OUTPATIENT)
Dept: SURGERY | Facility: CLINIC | Age: 65
End: 2025-05-20

## 2025-05-20 NOTE — TELEPHONE ENCOUNTER
Notify patient to discontinue his Eliquis for 4 doses prior to the day of surgery.  Therefore his last dose of Eliquis will be on the 24th evening.  Notify Dr. Barr's office that this has been approved.

## 2025-05-20 NOTE — TELEPHONE ENCOUNTER
Spoke to patient and notified him he needs to hold his Elquis 4 days before surgery and that last dose should be on 24th evening per Dr. Espinosa. Message forwarded to Dr. Barr's office.

## 2025-05-20 NOTE — TELEPHONE ENCOUNTER
Good Afternoon,    Vini was last seen by you on 4/14/2025.  He had a consultation 5/19/2025 with Dr Barr for Inguinal Hernia and scheduled to have this repaired on 5/27/2025.  Dr Barr is requesting Vini hold his Eliquis for 2 days prior to the procedure.  Please advise.

## 2025-05-21 ENCOUNTER — TELEPHONE (OUTPATIENT)
Dept: SURGERY | Facility: CLINIC | Age: 65
End: 2025-05-21

## 2025-05-21 NOTE — TELEPHONE ENCOUNTER
FOLLOW UP: Surgery set for 5/27/25    REASON FOR CONVERSATION: Upcoming Surgery    SYMPTOMS: Mild Groin discomfort    OTHER: Patient of  who is scheduled for hernia surgery on 5/27/25. Is considering postponing his surgery. He will talk with family and call back to let Asya know on what he decides. He states he will call back tomorrow.    DISPOSITION: Information or Advice Only Call

## 2025-06-17 ENCOUNTER — ANESTHESIA EVENT (OUTPATIENT)
Dept: PERIOP | Facility: HOSPITAL | Age: 65
End: 2025-06-17
Payer: COMMERCIAL

## 2025-06-17 ENCOUNTER — HOSPITAL ENCOUNTER (EMERGENCY)
Facility: HOSPITAL | Age: 65
Discharge: HOME | End: 2025-06-17
Attending: EMERGENCY MEDICINE | Admitting: EMERGENCY MEDICINE
Payer: COMMERCIAL

## 2025-06-17 ENCOUNTER — APPOINTMENT (EMERGENCY)
Dept: CT IMAGING | Facility: HOSPITAL | Age: 65
End: 2025-06-17
Payer: COMMERCIAL

## 2025-06-17 VITALS
TEMPERATURE: 97.6 F | RESPIRATION RATE: 18 BRPM | HEART RATE: 69 BPM | OXYGEN SATURATION: 97 % | DIASTOLIC BLOOD PRESSURE: 68 MMHG | SYSTOLIC BLOOD PRESSURE: 134 MMHG

## 2025-06-17 DIAGNOSIS — K40.90 INGUINAL HERNIA: Primary | ICD-10-CM

## 2025-06-17 LAB
ALBUMIN SERPL BCG-MCNC: 4.4 G/DL (ref 3.5–5)
ALP SERPL-CCNC: 56 U/L (ref 34–104)
ALT SERPL W P-5'-P-CCNC: 15 U/L (ref 7–52)
ANION GAP SERPL CALCULATED.3IONS-SCNC: 6 MMOL/L (ref 4–13)
AST SERPL W P-5'-P-CCNC: 16 U/L (ref 13–39)
BASOPHILS # BLD AUTO: 0.05 THOUSANDS/ÂΜL (ref 0–0.1)
BASOPHILS NFR BLD AUTO: 1 % (ref 0–1)
BILIRUB SERPL-MCNC: 0.61 MG/DL (ref 0.2–1)
BUN SERPL-MCNC: 11 MG/DL (ref 5–25)
CALCIUM SERPL-MCNC: 9.3 MG/DL (ref 8.4–10.2)
CHLORIDE SERPL-SCNC: 103 MMOL/L (ref 96–108)
CO2 SERPL-SCNC: 31 MMOL/L (ref 21–32)
CREAT SERPL-MCNC: 1.1 MG/DL (ref 0.6–1.3)
EOSINOPHIL # BLD AUTO: 0.15 THOUSAND/ÂΜL (ref 0–0.61)
EOSINOPHIL NFR BLD AUTO: 2 % (ref 0–6)
ERYTHROCYTE [DISTWIDTH] IN BLOOD BY AUTOMATED COUNT: 12.8 % (ref 11.6–15.1)
GFR SERPL CREATININE-BSD FRML MDRD: 69 ML/MIN/1.73SQ M
GLUCOSE SERPL-MCNC: 94 MG/DL (ref 65–140)
HCT VFR BLD AUTO: 42.1 % (ref 36.5–49.3)
HGB BLD-MCNC: 14.2 G/DL (ref 12–17)
IMM GRANULOCYTES # BLD AUTO: 0.02 THOUSAND/UL (ref 0–0.2)
IMM GRANULOCYTES NFR BLD AUTO: 0 % (ref 0–2)
LACTATE SERPL-SCNC: 0.6 MMOL/L (ref 0.5–2)
LYMPHOCYTES # BLD AUTO: 1.47 THOUSANDS/ÂΜL (ref 0.6–4.47)
LYMPHOCYTES NFR BLD AUTO: 23 % (ref 14–44)
MCH RBC QN AUTO: 31.1 PG (ref 26.8–34.3)
MCHC RBC AUTO-ENTMCNC: 33.7 G/DL (ref 31.4–37.4)
MCV RBC AUTO: 92 FL (ref 82–98)
MONOCYTES # BLD AUTO: 0.46 THOUSAND/ÂΜL (ref 0.17–1.22)
MONOCYTES NFR BLD AUTO: 7 % (ref 4–12)
NEUTROPHILS # BLD AUTO: 4.33 THOUSANDS/ÂΜL (ref 1.85–7.62)
NEUTS SEG NFR BLD AUTO: 67 % (ref 43–75)
NRBC BLD AUTO-RTO: 0 /100 WBCS
PLATELET # BLD AUTO: 207 THOUSANDS/UL (ref 149–390)
PMV BLD AUTO: 10.4 FL (ref 8.9–12.7)
POTASSIUM SERPL-SCNC: 4.3 MMOL/L (ref 3.5–5.3)
PROT SERPL-MCNC: 7.1 G/DL (ref 6.4–8.4)
RBC # BLD AUTO: 4.56 MILLION/UL (ref 3.88–5.62)
SODIUM SERPL-SCNC: 140 MMOL/L (ref 135–147)
WBC # BLD AUTO: 6.48 THOUSAND/UL (ref 4.31–10.16)

## 2025-06-17 PROCEDURE — 99214 OFFICE O/P EST MOD 30 MIN: CPT | Performed by: SURGERY

## 2025-06-17 PROCEDURE — 36415 COLL VENOUS BLD VENIPUNCTURE: CPT | Performed by: PHYSICIAN ASSISTANT

## 2025-06-17 PROCEDURE — 99284 EMERGENCY DEPT VISIT MOD MDM: CPT

## 2025-06-17 PROCEDURE — 74177 CT ABD & PELVIS W/CONTRAST: CPT

## 2025-06-17 PROCEDURE — 83605 ASSAY OF LACTIC ACID: CPT | Performed by: PHYSICIAN ASSISTANT

## 2025-06-17 PROCEDURE — 99285 EMERGENCY DEPT VISIT HI MDM: CPT | Performed by: EMERGENCY MEDICINE

## 2025-06-17 PROCEDURE — 80053 COMPREHEN METABOLIC PANEL: CPT | Performed by: PHYSICIAN ASSISTANT

## 2025-06-17 PROCEDURE — 85025 COMPLETE CBC W/AUTO DIFF WBC: CPT | Performed by: PHYSICIAN ASSISTANT

## 2025-06-17 RX ADMIN — IOHEXOL 100 ML: 350 INJECTION, SOLUTION INTRAVENOUS at 15:38

## 2025-06-17 NOTE — ED PROVIDER NOTES
Time reflects when diagnosis was documented in both MDM as applicable and the Disposition within this note       Time User Action Codes Description Comment    6/17/2025  4:14 PM Slim Collado Add [K40.90] Inguinal hernia           ED Disposition       ED Disposition   Discharge    Condition   Stable    Date/Time   Tue Jun 17, 2025  4:14 PM    Comment   Vini Ybarra discharge to home/self care.                   Assessment & Plan       Medical Decision Making  64-year-old male with right inguinal pain history of inguinal hernia here for evaluation.  See HPI for further details    The differential diagnosis includes sepsis, incarcerated hernia, nonincarcerated hernia, electrolyte disturbance, acute kidney injury.    Workup is reassuring labs normal CAT scan shows some fat-containing right umbilical hernia General Surgery saw him in person at bedside stable for discharge home will follow-up with outpatient schedule date    Amount and/or Complexity of Data Reviewed  Labs: ordered.  Radiology: ordered.    Risk  Prescription drug management.        ED Course as of 06/17/25 1615   Tue Jun 17, 2025   1423 Blood Pressure: 148/83   1423 Temperature: 97.6 °F (36.4 °C)   1423 Pulse: 66   1423 Respirations: 18   1423 SpO2: 98 %  Vs reviewed wnl   1446 Cordial message sent to general surgery regarding this patient while we await workup   1523 CBC lactic acid metabolic panel normal       Medications   iohexol (OMNIPAQUE) 350 MG/ML injection (MULTI-DOSE) 100 mL (100 mL Intravenous Given 6/17/25 1538)       ED Risk Strat Scores                    No data recorded        SBIRT 22yo+      Flowsheet Row Most Recent Value   Initial Alcohol Screen: US AUDIT-C     1. How often do you have a drink containing alcohol? 0 Filed at: 06/17/2025 1414   2. How many drinks containing alcohol do you have on a typical day you are drinking?  0 Filed at: 06/17/2025 1414   3a. Male UNDER 65: How often do you have five or more drinks on one  occasion? 0 Filed at: 06/17/2025 1414   3b. FEMALE Any Age, or MALE 65+: How often do you have 4 or more drinks on one occassion? 0 Filed at: 06/17/2025 1414   Audit-C Score 0 Filed at: 06/17/2025 1414   TORSTEN: How many times in the past year have you...    Used an illegal drug or used a prescription medication for non-medical reasons? Never Filed at: 06/17/2025 1414                            History of Present Illness       Chief Complaint   Patient presents with    Inguinal Hernia     Patient reports an inguinal hernia that he is to have operated on in 1 week. Reports pain has been getting worse.        Past Medical History[1]   Past Surgical History[2]   Family History[3]   Social History[4]   E-Cigarette/Vaping    E-Cigarette Use Never User       E-Cigarette/Vaping Substances    Nicotine No     THC No     CBD No     Flavoring No     Other No     Unknown No       I have reviewed and agree with the history as documented.     This is a 64-year-old male presenting to the emergency department today for evaluation of right inguinal pain and swelling.  He states he has a known inguinal hernia was scheduled to have it repaired with general surgery here few weeks back but he elected to cancel that.  He was rescheduled for a week from today.  He states over the last 3 days he has had increasing pain and swelling in the region.  He is producing urine and stool without difficulty no fever.  No new trauma.  Last oral intake was at 0130 hrs. this morning.          Review of Systems   Constitutional: Negative.  Negative for activity change, appetite change, chills, diaphoresis, fatigue, fever and unexpected weight change.   HENT: Negative.  Negative for sore throat, trouble swallowing and voice change.    Eyes: Negative.    Respiratory: Negative.  Negative for cough, chest tightness, shortness of breath and wheezing.    Cardiovascular: Negative.  Negative for chest pain, palpitations and leg swelling.   Gastrointestinal:   Positive for abdominal pain. Negative for blood in stool, nausea and vomiting.   Endocrine: Negative.    Genitourinary: Negative.  Negative for flank pain and hematuria.   Musculoskeletal: Negative.  Negative for arthralgias, back pain, gait problem, joint swelling, myalgias, neck pain and neck stiffness.   Skin: Negative.  Negative for rash and wound.   Allergic/Immunologic: Negative.    Neurological: Negative.  Negative for dizziness, seizures, syncope, weakness, light-headedness and headaches.   Hematological: Negative.    Psychiatric/Behavioral: Negative.     All other systems reviewed and are negative.          Objective       ED Triage Vitals [06/17/25 1414]   Temperature Pulse Blood Pressure Respirations SpO2 Patient Position - Orthostatic VS   97.6 °F (36.4 °C) 66 148/83 18 98 % Lying      Temp Source Heart Rate Source BP Location FiO2 (%) Pain Score    Temporal Monitor Right arm -- 2      Vitals      Date and Time Temp Pulse SpO2 Resp BP Pain Score FACES Pain Rating User   06/17/25 1428 -- 67 98 % -- 132/81 -- -- LD   06/17/25 1414 97.6 °F (36.4 °C) 66 98 % 18 148/83 2 -- SK            Physical Exam  Vitals reviewed.   Constitutional:       General: He is not in acute distress.     Appearance: Normal appearance. He is not ill-appearing, toxic-appearing or diaphoretic.   HENT:      Head: Normocephalic and atraumatic.      Right Ear: External ear normal.      Left Ear: External ear normal.     Eyes:      General: No scleral icterus.        Right eye: No discharge.         Left eye: No discharge.      Extraocular Movements: Extraocular movements intact.      Conjunctiva/sclera: Conjunctivae normal.       Cardiovascular:      Rate and Rhythm: Normal rate.      Pulses: Normal pulses.   Pulmonary:      Effort: Pulmonary effort is normal. No respiratory distress.      Breath sounds: No stridor.   Abdominal:      Tenderness: There is abdominal tenderness.      Comments: Right inguinal tenderness      Musculoskeletal:         General: No deformity or signs of injury.      Cervical back: Normal range of motion. No rigidity.     Skin:     General: Skin is warm.      Coloration: Skin is not jaundiced.      Findings: No lesion or rash.     Neurological:      General: No focal deficit present.      Mental Status: He is alert and oriented to person, place, and time. Mental status is at baseline.      Gait: Gait normal.     Psychiatric:         Mood and Affect: Mood normal.         Thought Content: Thought content normal.         Judgment: Judgment normal.         Results Reviewed       Procedure Component Value Units Date/Time    Comprehensive metabolic panel [[id number]] Collected: 06/17/25 1451    Lab Status: Final result Specimen: Blood from Arm, Right Updated: 06/17/25 1519     Sodium 140 mmol/L      Potassium 4.3 mmol/L      Chloride 103 mmol/L      CO2 31 mmol/L      ANION GAP 6 mmol/L      BUN 11 mg/dL      Creatinine 1.10 mg/dL      Glucose 94 mg/dL      Calcium 9.3 mg/dL      AST 16 U/L      ALT 15 U/L      Alkaline Phosphatase 56 U/L      Total Protein 7.1 g/dL      Albumin 4.4 g/dL      Total Bilirubin 0.61 mg/dL      eGFR 69 ml/min/1.73sq m     Narrative:      National Kid[employer] guidelines for Chronic Kidney Disease (CKD):     Stage 1 with normal or high GFR (GFR > 90 mL/min/1.73 square meters)    Stage 2 Mild CKD (GFR = 60-89 mL/min/1.73 square meters)    Stage 3A Moderate CKD (GFR = 45-59 mL/min/1.73 square meters)    Stage 3B Moderate CKD (GFR = 30-44 mL/min/1.73 square meters)    Stage 4 Severe CKD (GFR = 15-29 mL/min/1.73 square meters)    Stage 5 End Stage CKD (GFR <15 mL/min/1.73 square meters)  Note: GFR calculation is accurate only with a steady state creatinine    Lactic acid, plasma (w/reflex if result > 2.0) [[id number]]  (Normal) Collected: 06/17/25 1451    Lab Status: Final result Specimen: Blood from Arm, Right Updated: 06/17/25 1516     LACTIC ACID 0.6 mmol/L     Narrative:       Result may be elevated if tourniquet was used during collection.    CBC and differential [[id number]] Collected: 06/17/25 1451    Lab Status: Final result Specimen: Blood from Arm, Right Updated: 06/17/25 1501     WBC 6.48 Thousand/uL      RBC 4.56 Million/uL      Hemoglobin 14.2 g/dL      Hematocrit 42.1 %      MCV 92 fL      MCH 31.1 pg      MCHC 33.7 g/dL      RDW 12.8 %      MPV 10.4 fL      Platelets 207 Thousands/uL      nRBC 0 /100 WBCs      Segmented % 67 %      Immature Grans % 0 %      Lymphocytes % 23 %      Monocytes % 7 %      Eosinophils Relative 2 %      Basophils Relative 1 %      Absolute Neutrophils 4.33 Thousands/µL      Absolute Immature Grans 0.02 Thousand/uL      Absolute Lymphocytes 1.47 Thousands/µL      Absolute Monocytes 0.46 Thousand/µL      Eosinophils Absolute 0.15 Thousand/µL      Basophils Absolute 0.05 Thousands/µL             CT abdomen pelvis with contrast    (Results Pending)       Procedures    ED Medication and Procedure Management   Prior to Admission Medications   Prescriptions Last Dose Informant Patient Reported? Taking?   Multiple Vitamins-Minerals (ZINC PO)   Yes No   Sig: Take by mouth   albuterol (Ventolin HFA) 90 mcg/act inhaler   No No   Sig: Inhale 2 puffs every 6 (six) hours as needed for wheezing   apixaban (ELIQUIS) 2.5 mg   No No   Sig: Take 1 tablet (2.5 mg total) by mouth 2 (two) times a day   gabapentin (NEURONTIN) 300 mg capsule  Self Yes No   Sig: Take 300 mg by mouth 3 (three) times a day as needed   ketoconazole (NIZORAL) 2 % cream  Self Yes No   Sig: as needed   naproxen (Naprosyn) 500 mg tablet   No No   Sig: Take 1 tablet (500 mg total) by mouth 2 (two) times a day with meals for 5 days   Patient taking differently: Take 500 mg by mouth as needed   omeprazole (PriLOSEC) 20 mg delayed release capsule   No No   Sig: take 1 capsule by mouth once daily   Patient taking differently: Take 20 mg by mouth as needed   oxyCODONE (OXY-IR) 5 MG capsule   No No    Sig: Take 1 capsule (5 mg total) by mouth 2 (two) times a day as needed for moderate pain Max Daily Amount: 10 mg   rosuvastatin (CRESTOR) 5 mg tablet  Self No No   Sig: Take 1 tablet (5 mg total) by mouth daily      Facility-Administered Medications: None     Patient's Medications   Discharge Prescriptions    No medications on file     No discharge procedures on file.  ED SEPSIS DOCUMENTATION   Time reflects when diagnosis was documented in both MDM as applicable and the Disposition within this note       Time User Action Codes Description Comment    6/17/2025  4:14 PM Slim Collado Add [K40.90] Inguinal hernia                    [1]   Past Medical History:  Diagnosis Date    Arthritis     Benign prostatic hyperplasia with incomplete bladder emptying 02/28/2024    Bilateral pulmonary embolism (HCC) 06/23/2024    Chronic pain disorder     COVID 2021    GERD (gastroesophageal reflux disease)     Spinal cord dysplasia (HCC)    [2]   Past Surgical History:  Procedure Laterality Date    CERVICAL FUSION      COLONOSCOPY      EGD      HERNIA REPAIR Left     NASAL SEPTOPLASTY W/ TURBINOPLASTY  08/22/2003    KS BX PROSTATE STRTCTC SATURATION SAMPLING IMG GID N/A 11/22/2023    Procedure: TRANSPERINEAL ULTRASOUND GUIDED BIOPSY PROSTATE;  Surgeon: Luis Antonio Rodrigues MD;  Location: [facility];  Service: Urology    KS TRURL ELECTROSURG RESCJ PROSTATE BLEED COMPLETE N/A 02/28/2024    Procedure: TRANSURETHRAL RESECTION OF PROSTATE (TURP);  Surgeon: Luis Antonio Rodrigues MD;  Location: [facility];  Service: Urology    TOOTH EXTRACTION     [3]   Family History  Problem Relation Name Age of Onset    Cancer Mother      Cancer Father      Lung cancer Brother      Cancer Son     [4]   Social History  Tobacco Use    Smoking status: Former     Average packs/day: 0.5 packs/day for 40.0 years (20.0 ttl pk-yrs)     Types: Cigarettes     Start date: 1974     Passive exposure: Past    Smokeless tobacco: Never   Vaping Use    Vaping status: Never Used    Substance Use Topics    Alcohol use: Yes     Comment: couple beers a week    Drug use: Not Currently     Types: Marijuana     Comment: 50 years ago        Slim Collado PA-C  06/17/25 [id number]

## 2025-06-17 NOTE — H&P (VIEW-ONLY)
Consult - Surgery-General   Name: Vini Ybarra 64 y.o. male I MRN: [id number]  Unit/Bed#: RM23 I Date of Admission: 6/17/2025   Date of Service: 6/17/2025 I Hospital Day: 0     Consult to surgery general  Consult performed by: Catrachito Arrington MD  Consult ordered by: Slim Collado PA-C          Assessment & Plan  Right inguinal hernia  Fat containing R inguinal hernia. Seen on 5/19, scheduled for R IHR next Tuesday (6/24/25), but came to ED due to hernia pain when it herniates (when he walks). Remains reducible and pain improves when it reduces.    - maintain OR case next Tuesday (6/24)  - avoid heavy lifting/activity, encourage truss prn  - restart eliquis til 3d prior to scheduled surgery    HPI:  Vini Ybarra is a 64 y.o. male with PMHx of 2024 b/l PE 2/2 lupus anticoagulant + (on eliquis), chronic pain, spinal cord dysplasia, BPH, GERD, 2024 TURP, cervical fusion, L IHR w/ mesh, L orchiopexy as child, who presents with R inguinal hernia. Seen on 5/19/25, scheduled for R IHR next Tuesday (6/24/25), but came to ED due to pain/burning at his hernia only when it herniates (when he walks for 15 minutes), for last 2 days. Currently pain/bulging is better as he lays down. The hernia remains reducible. Worse with activity. Better with rest/sitting/laying down, best in morning when he wakes up.  Denies fever/chills, n/v. Last ate at 1:30am. Also held his eliquis for last 3d and took a pre-op shower last night. Last BM yesterday, passing flatus.    Objective   Patient Vitals for the past 24 hrs:   BP Temp Temp src Pulse Resp SpO2   06/17/25 1428 132/81 -- -- 67 -- 98 %   06/17/25 1414 148/83 97.6 °F (36.4 °C) Temporal 66 18 98 %       Physical Exam  Constitutional:       General: He is not in acute distress.  HENT:      Head: Normocephalic and atraumatic.     Eyes:      Extraocular Movements: Extraocular movements intact.      Conjunctiva/sclera: Conjunctivae normal.       Cardiovascular:      Rate and Rhythm:  Normal rate.      Pulses: Normal pulses.   Pulmonary:      Effort: Pulmonary effort is normal. No respiratory distress.   Abdominal:      General: There is no distension.      Palpations: Abdomen is soft.      Tenderness: There is no abdominal tenderness.      Comments: R inguinal hernia easily reducible, soft, nontender (moreso just pressure).     Musculoskeletal:         General: Normal range of motion.      Cervical back: Normal range of motion.     Skin:     General: Skin is warm and dry.     Neurological:      General: No focal deficit present.      Mental Status: He is alert and oriented to person, place, and time.     Psychiatric:         Mood and Affect: Mood normal.       Review of Systems   Constitutional:  Negative for chills and fever.   HENT:  Negative for ear pain and sore throat.    Eyes:  Negative for pain and visual disturbance.   Respiratory:  Negative for cough and shortness of breath.    Cardiovascular:  Negative for chest pain and palpitations.   Gastrointestinal:  Negative for abdominal distention, abdominal pain, constipation, diarrhea, nausea and vomiting.        Pain/burning at his hernia only when it herniates    Genitourinary:  Negative for dysuria and hematuria.   Musculoskeletal:  Negative for arthralgias and back pain.   Skin:  Negative for color change and rash.   Neurological:  Negative for seizures and syncope.   All other systems reviewed and are negative.      Meds: eliquis (held last 3d by patient), gabapentin, PPI, statin    Results:  06/17/25 CTAP: fat containing R inguinal hernia. Also incidental umbilical hernia.    Recent Labs     06/17/25  1451   WBC 6.48   HGB 14.2      SODIUM 140   K 4.3      CO2 31   BUN 11   CREATININE 1.10   GLUC 94   CALCIUM 9.3   AST 16   ALT 15   ALKPHOS 56   TBILI 0.61   EGFR 69   LACTICACID 0.6     Lab Results   Component Value Date    BLOODCX No Growth After 5 Days. 03/09/2023    URINECX No Growth <1000 cfu/mL 02/28/2025     LEUKOCYTESUR Negative 02/28/2025    LEUKOCYTESUR Negative 12/19/2023    LEUKOCYTESUR NEGATIVE 06/13/2018    NITRITE Negative 02/28/2025    NITRITE Negative 12/19/2023    NITRITE NEGATIVE 06/13/2018    GLUCOSEU Negative 02/28/2025    GLUCOSEU Negative 12/19/2023    GLUCOSEU NEGATIVE 06/13/2018    KETONESU Negative 02/28/2025    KETONESU Negative 12/19/2023    KETONESU NEGATIVE 06/13/2018    BLOODU Negative 02/28/2025    BLOODU Negative 12/19/2023    BLOODU NEGATIVE 06/13/2018       Lab Results: I have personally reviewed pertinent lab results.  Imaging: I have personally reviewed pertinent reports.  EKG, Pathology, and Other Studies: I have personally reviewed pertinent reports.  All current active meds have been reviewed  Counseling / Coordination of Care: Total floor / unit time spent today 30 minutes.  Greater than 50% of total time was spent with the patient and / or family counseling and / or coordination of care.

## 2025-06-17 NOTE — CONSULTS
Consult - Surgery-General   Name: Vini Ybarra 64 y.o. male I MRN: [id number]  Unit/Bed#: RM23 I Date of Admission: 6/17/2025   Date of Service: 6/17/2025 I Hospital Day: 0     Consult to surgery general  Consult performed by: Catrachito Arrington MD  Consult ordered by: Slim Collado PA-C          Assessment & Plan  Right inguinal hernia  Fat containing R inguinal hernia. Seen on 5/19, scheduled for R IHR next Tuesday (6/24/25), but came to ED due to hernia pain when it herniates (when he walks). Remains reducible and pain improves when it reduces.    - maintain OR case next Tuesday (6/24)  - avoid heavy lifting/activity, encourage truss prn  - restart eliquis til 3d prior to scheduled surgery    HPI:  Vini Ybarra is a 64 y.o. male with PMHx of 2024 b/l PE 2/2 lupus anticoagulant + (on eliquis), chronic pain, spinal cord dysplasia, BPH, GERD, 2024 TURP, cervical fusion, L IHR w/ mesh, L orchiopexy as child, who presents with R inguinal hernia. Seen on 5/19/25, scheduled for R IHR next Tuesday (6/24/25), but came to ED due to pain/burning at his hernia only when it herniates (when he walks for 15 minutes), for last 2 days. Currently pain/bulging is better as he lays down. The hernia remains reducible. Worse with activity. Better with rest/sitting/laying down, best in morning when he wakes up.  Denies fever/chills, n/v. Last ate at 1:30am. Also held his eliquis for last 3d and took a pre-op shower last night. Last BM yesterday, passing flatus.    Objective   Patient Vitals for the past 24 hrs:   BP Temp Temp src Pulse Resp SpO2   06/17/25 1428 132/81 -- -- 67 -- 98 %   06/17/25 1414 148/83 97.6 °F (36.4 °C) Temporal 66 18 98 %       Physical Exam  Constitutional:       General: He is not in acute distress.  HENT:      Head: Normocephalic and atraumatic.     Eyes:      Extraocular Movements: Extraocular movements intact.      Conjunctiva/sclera: Conjunctivae normal.       Cardiovascular:      Rate and Rhythm:  Normal rate.      Pulses: Normal pulses.   Pulmonary:      Effort: Pulmonary effort is normal. No respiratory distress.   Abdominal:      General: There is no distension.      Palpations: Abdomen is soft.      Tenderness: There is no abdominal tenderness.      Comments: R inguinal hernia easily reducible, soft, nontender (moreso just pressure).     Musculoskeletal:         General: Normal range of motion.      Cervical back: Normal range of motion.     Skin:     General: Skin is warm and dry.     Neurological:      General: No focal deficit present.      Mental Status: He is alert and oriented to person, place, and time.     Psychiatric:         Mood and Affect: Mood normal.       Review of Systems   Constitutional:  Negative for chills and fever.   HENT:  Negative for ear pain and sore throat.    Eyes:  Negative for pain and visual disturbance.   Respiratory:  Negative for cough and shortness of breath.    Cardiovascular:  Negative for chest pain and palpitations.   Gastrointestinal:  Negative for abdominal distention, abdominal pain, constipation, diarrhea, nausea and vomiting.        Pain/burning at his hernia only when it herniates    Genitourinary:  Negative for dysuria and hematuria.   Musculoskeletal:  Negative for arthralgias and back pain.   Skin:  Negative for color change and rash.   Neurological:  Negative for seizures and syncope.   All other systems reviewed and are negative.      Meds: eliquis (held last 3d by patient), gabapentin, PPI, statin    Results:  06/17/25 CTAP: fat containing R inguinal hernia. Also incidental umbilical hernia.    Recent Labs     06/17/25  1451   WBC 6.48   HGB 14.2      SODIUM 140   K 4.3      CO2 31   BUN 11   CREATININE 1.10   GLUC 94   CALCIUM 9.3   AST 16   ALT 15   ALKPHOS 56   TBILI 0.61   EGFR 69   LACTICACID 0.6     Lab Results   Component Value Date    BLOODCX No Growth After 5 Days. 03/09/2023    URINECX No Growth <1000 cfu/mL 02/28/2025     LEUKOCYTESUR Negative 02/28/2025    LEUKOCYTESUR Negative 12/19/2023    LEUKOCYTESUR NEGATIVE 06/13/2018    NITRITE Negative 02/28/2025    NITRITE Negative 12/19/2023    NITRITE NEGATIVE 06/13/2018    GLUCOSEU Negative 02/28/2025    GLUCOSEU Negative 12/19/2023    GLUCOSEU NEGATIVE 06/13/2018    KETONESU Negative 02/28/2025    KETONESU Negative 12/19/2023    KETONESU NEGATIVE 06/13/2018    BLOODU Negative 02/28/2025    BLOODU Negative 12/19/2023    BLOODU NEGATIVE 06/13/2018       Lab Results: I have personally reviewed pertinent lab results.  Imaging: I have personally reviewed pertinent reports.  EKG, Pathology, and Other Studies: I have personally reviewed pertinent reports.  All current active meds have been reviewed  Counseling / Coordination of Care: Total floor / unit time spent today 30 minutes.  Greater than 50% of total time was spent with the patient and / or family counseling and / or coordination of care.

## 2025-06-19 ENCOUNTER — TELEPHONE (OUTPATIENT)
Age: 65
End: 2025-06-19

## 2025-06-19 NOTE — TELEPHONE ENCOUNTER
Pt called in requesting if Dr. Espinosa could please give him a call at his earliest convenience.     Pt expressed he wanted to discuss with Dr. Espinosa the reason as to why he needs the other CT Scan? Pt reports he did schedule for it for 08/01, but wanted to know what showed up in his CT scan that resulted in him needing another one?     Attempted to schedule an appt with pt, but the next available wasn't until 07/01. Pt wished to send message.     Lastly, pt reports he has been having more pain in his abdomen, belly region and is different from the previous pain he had prior.     Please advise & call pt back with update. Thank you!    Vini: [phone]

## 2025-06-20 NOTE — TELEPHONE ENCOUNTER
Dr Espinosa verbally asked me to call patient and make him aware he will call him Monday.  Called patient but voicemail is full.

## 2025-06-23 NOTE — TELEPHONE ENCOUNTER
I spoke with Vini, having some periumbilical epigastric pain at times.  He has a surgery coming up on his inguinal hernia.  The pain he has is not debilitating.  Informed him he should go to the emergency room if seems to be persisting, he sees GI in about 3 weeks.  I would like to see him in about 6 weeks if we can make him that appointment for me.

## 2025-06-24 ENCOUNTER — HOSPITAL ENCOUNTER (OUTPATIENT)
Facility: HOSPITAL | Age: 65
Setting detail: OUTPATIENT SURGERY
Discharge: HOME | End: 2025-06-24
Attending: SURGERY | Admitting: SURGERY
Payer: COMMERCIAL

## 2025-06-24 ENCOUNTER — ANESTHESIA (OUTPATIENT)
Dept: PERIOP | Facility: HOSPITAL | Age: 65
End: 2025-06-24
Payer: COMMERCIAL

## 2025-06-24 ENCOUNTER — NURSE TRIAGE (OUTPATIENT)
Age: 65
End: 2025-06-24

## 2025-06-24 VITALS
HEIGHT: 70 IN | RESPIRATION RATE: 20 BRPM | OXYGEN SATURATION: 95 % | DIASTOLIC BLOOD PRESSURE: 62 MMHG | BODY MASS INDEX: 28.49 KG/M2 | SYSTOLIC BLOOD PRESSURE: 120 MMHG | TEMPERATURE: 97.7 F | WEIGHT: 199 LBS | HEART RATE: 56 BPM

## 2025-06-24 DIAGNOSIS — K40.90 RIGHT INGUINAL HERNIA: Primary | ICD-10-CM

## 2025-06-24 LAB
APTT PPP: 26 SECONDS (ref 23–34)
INR PPP: 0.89 (ref 0.85–1.19)
PROTHROMBIN TIME: 12.6 SECONDS (ref 12.3–15)

## 2025-06-24 PROCEDURE — 49505 PRP I/HERN INIT REDUC >5 YR: CPT | Performed by: SURGERY

## 2025-06-24 PROCEDURE — 85610 PROTHROMBIN TIME: CPT | Performed by: ANESTHESIOLOGY

## 2025-06-24 PROCEDURE — 85730 THROMBOPLASTIN TIME PARTIAL: CPT | Performed by: ANESTHESIOLOGY

## 2025-06-24 PROCEDURE — C1781 MESH (IMPLANTABLE): HCPCS | Performed by: SURGERY

## 2025-06-24 PROCEDURE — 49591 RPR AA HRN 1ST < 3 CM RDC: CPT | Performed by: SURGERY

## 2025-06-24 DEVICE — PERFIX PLUG, 1.5" X 2.0" (3.8 CM X 5.0 CM), EXTRA LARGE (CONTENTS: 2)
Type: IMPLANTABLE DEVICE | Status: FUNCTIONAL
Brand: PERFIX

## 2025-06-24 RX ORDER — PHENYLEPHRINE HCL IN 0.9% NACL 1 MG/10 ML
SYRINGE (ML) INTRAVENOUS AS NEEDED
Status: DISCONTINUED | OUTPATIENT
Start: 2025-06-24 | End: 2025-06-24

## 2025-06-24 RX ORDER — ONDANSETRON 2 MG/ML
4 INJECTION INTRAMUSCULAR; INTRAVENOUS ONCE AS NEEDED
Status: DISCONTINUED | OUTPATIENT
Start: 2025-06-24 | End: 2025-06-24 | Stop reason: HOSPADM

## 2025-06-24 RX ORDER — GLYCOPYRROLATE 0.2 MG/ML
INJECTION INTRAMUSCULAR; INTRAVENOUS AS NEEDED
Status: DISCONTINUED | OUTPATIENT
Start: 2025-06-24 | End: 2025-06-24

## 2025-06-24 RX ORDER — LIDOCAINE HYDROCHLORIDE 10 MG/ML
INJECTION, SOLUTION EPIDURAL; INFILTRATION; INTRACAUDAL; PERINEURAL AS NEEDED
Status: DISCONTINUED | OUTPATIENT
Start: 2025-06-24 | End: 2025-06-24

## 2025-06-24 RX ORDER — SODIUM CHLORIDE, SODIUM LACTATE, POTASSIUM CHLORIDE, CALCIUM CHLORIDE 600; 310; 30; 20 MG/100ML; MG/100ML; MG/100ML; MG/100ML
INJECTION, SOLUTION INTRAVENOUS CONTINUOUS PRN
Status: DISCONTINUED | OUTPATIENT
Start: 2025-06-24 | End: 2025-06-24

## 2025-06-24 RX ORDER — NEOSTIGMINE METHYLSULFATE 1 MG/ML
INJECTION INTRAVENOUS AS NEEDED
Status: DISCONTINUED | OUTPATIENT
Start: 2025-06-24 | End: 2025-06-24

## 2025-06-24 RX ORDER — DEXAMETHASONE SODIUM PHOSPHATE 10 MG/ML
INJECTION, SOLUTION INTRAMUSCULAR; INTRAVENOUS AS NEEDED
Status: DISCONTINUED | OUTPATIENT
Start: 2025-06-24 | End: 2025-06-24

## 2025-06-24 RX ORDER — FENTANYL CITRATE/PF 50 MCG/ML
50 SYRINGE (ML) INJECTION
Status: DISCONTINUED | OUTPATIENT
Start: 2025-06-24 | End: 2025-06-24 | Stop reason: HOSPADM

## 2025-06-24 RX ORDER — CEFAZOLIN SODIUM 2 G/50ML
2000 SOLUTION INTRAVENOUS ONCE
Status: COMPLETED | OUTPATIENT
Start: 2025-06-24 | End: 2025-06-24

## 2025-06-24 RX ORDER — EPHEDRINE SULFATE 50 MG/ML
INJECTION INTRAVENOUS AS NEEDED
Status: DISCONTINUED | OUTPATIENT
Start: 2025-06-24 | End: 2025-06-24

## 2025-06-24 RX ORDER — MIDAZOLAM HYDROCHLORIDE 2 MG/2ML
INJECTION, SOLUTION INTRAMUSCULAR; INTRAVENOUS AS NEEDED
Status: DISCONTINUED | OUTPATIENT
Start: 2025-06-24 | End: 2025-06-24

## 2025-06-24 RX ORDER — ONDANSETRON 2 MG/ML
INJECTION INTRAMUSCULAR; INTRAVENOUS AS NEEDED
Status: DISCONTINUED | OUTPATIENT
Start: 2025-06-24 | End: 2025-06-24

## 2025-06-24 RX ORDER — ROCURONIUM BROMIDE 10 MG/ML
INJECTION, SOLUTION INTRAVENOUS AS NEEDED
Status: DISCONTINUED | OUTPATIENT
Start: 2025-06-24 | End: 2025-06-24

## 2025-06-24 RX ORDER — HYDROMORPHONE HCL/PF 1 MG/ML
0.5 SYRINGE (ML) INJECTION
Status: DISCONTINUED | OUTPATIENT
Start: 2025-06-24 | End: 2025-06-24 | Stop reason: HOSPADM

## 2025-06-24 RX ORDER — BUPIVACAINE HYDROCHLORIDE 5 MG/ML
INJECTION, SOLUTION EPIDURAL; INTRACAUDAL; PERINEURAL AS NEEDED
Status: DISCONTINUED | OUTPATIENT
Start: 2025-06-24 | End: 2025-06-24 | Stop reason: HOSPADM

## 2025-06-24 RX ORDER — MAGNESIUM HYDROXIDE 1200 MG/15ML
LIQUID ORAL AS NEEDED
Status: DISCONTINUED | OUTPATIENT
Start: 2025-06-24 | End: 2025-06-24 | Stop reason: HOSPADM

## 2025-06-24 RX ORDER — KETOROLAC TROMETHAMINE 30 MG/ML
INJECTION, SOLUTION INTRAMUSCULAR; INTRAVENOUS AS NEEDED
Status: DISCONTINUED | OUTPATIENT
Start: 2025-06-24 | End: 2025-06-24

## 2025-06-24 RX ORDER — FENTANYL CITRATE 50 UG/ML
INJECTION, SOLUTION INTRAMUSCULAR; INTRAVENOUS AS NEEDED
Status: DISCONTINUED | OUTPATIENT
Start: 2025-06-24 | End: 2025-06-24

## 2025-06-24 RX ORDER — HEPARIN SODIUM 5000 [USP'U]/ML
5000 INJECTION, SOLUTION INTRAVENOUS; SUBCUTANEOUS ONCE
Status: COMPLETED | OUTPATIENT
Start: 2025-06-24 | End: 2025-06-24

## 2025-06-24 RX ORDER — SODIUM CHLORIDE, SODIUM LACTATE, POTASSIUM CHLORIDE, CALCIUM CHLORIDE 600; 310; 30; 20 MG/100ML; MG/100ML; MG/100ML; MG/100ML
125 INJECTION, SOLUTION INTRAVENOUS CONTINUOUS
Status: DISCONTINUED | OUTPATIENT
Start: 2025-06-24 | End: 2025-06-24 | Stop reason: HOSPADM

## 2025-06-24 RX ORDER — PROPOFOL 10 MG/ML
INJECTION, EMULSION INTRAVENOUS AS NEEDED
Status: DISCONTINUED | OUTPATIENT
Start: 2025-06-24 | End: 2025-06-24

## 2025-06-24 RX ORDER — OXYCODONE HYDROCHLORIDE 5 MG/1
10 TABLET ORAL EVERY 6 HOURS PRN
Refills: 0 | Status: DISCONTINUED | OUTPATIENT
Start: 2025-06-24 | End: 2025-06-24 | Stop reason: HOSPADM

## 2025-06-24 RX ORDER — OXYCODONE HYDROCHLORIDE 5 MG/1
5 TABLET ORAL EVERY 4 HOURS PRN
Qty: 20 TABLET | Refills: 0 | Status: SHIPPED | OUTPATIENT
Start: 2025-06-24

## 2025-06-24 RX ADMIN — ONDANSETRON 4 MG: 2 INJECTION INTRAMUSCULAR; INTRAVENOUS at 07:44

## 2025-06-24 RX ADMIN — SODIUM CHLORIDE, SODIUM LACTATE, POTASSIUM CHLORIDE, AND CALCIUM CHLORIDE: .6; .31; .03; .02 INJECTION, SOLUTION INTRAVENOUS at 09:08

## 2025-06-24 RX ADMIN — SODIUM CHLORIDE, SODIUM LACTATE, POTASSIUM CHLORIDE, AND CALCIUM CHLORIDE 125 ML/HR: .6; .31; .03; .02 INJECTION, SOLUTION INTRAVENOUS at 06:50

## 2025-06-24 RX ADMIN — OXYCODONE HYDROCHLORIDE 10 MG: 5 TABLET ORAL at 10:36

## 2025-06-24 RX ADMIN — NEOSTIGMINE METHYLSULFATE 5 MG: 1 INJECTION INTRAVENOUS at 09:09

## 2025-06-24 RX ADMIN — HEPARIN SODIUM 5000 UNITS: 5000 INJECTION INTRAVENOUS; SUBCUTANEOUS at 06:54

## 2025-06-24 RX ADMIN — PROPOFOL 170 MG: 10 INJECTION, EMULSION INTRAVENOUS at 07:30

## 2025-06-24 RX ADMIN — Medication 100 MCG: at 07:48

## 2025-06-24 RX ADMIN — DEXAMETHASONE SODIUM PHOSPHATE 10 MG: 10 INJECTION, SOLUTION INTRAMUSCULAR; INTRAVENOUS at 07:44

## 2025-06-24 RX ADMIN — GLYCOPYRROLATE 0.8 MG: 0.2 INJECTION, SOLUTION INTRAMUSCULAR; INTRAVENOUS at 09:09

## 2025-06-24 RX ADMIN — ROCURONIUM BROMIDE 50 MG: 10 INJECTION, SOLUTION INTRAVENOUS at 07:30

## 2025-06-24 RX ADMIN — FENTANYL CITRATE 50 MCG: 50 INJECTION INTRAMUSCULAR; INTRAVENOUS at 09:49

## 2025-06-24 RX ADMIN — CEFAZOLIN SODIUM 2000 MG: 2 SOLUTION INTRAVENOUS at 07:40

## 2025-06-24 RX ADMIN — EPHEDRINE SULFATE 10 MG: 50 INJECTION, SOLUTION INTRAVENOUS at 07:40

## 2025-06-24 RX ADMIN — FENTANYL CITRATE 50 MCG: 50 INJECTION INTRAMUSCULAR; INTRAVENOUS at 07:30

## 2025-06-24 RX ADMIN — FENTANYL CITRATE 50 MCG: 50 INJECTION INTRAMUSCULAR; INTRAVENOUS at 09:56

## 2025-06-24 RX ADMIN — SODIUM CHLORIDE, SODIUM LACTATE, POTASSIUM CHLORIDE, AND CALCIUM CHLORIDE: .6; .31; .03; .02 INJECTION, SOLUTION INTRAVENOUS at 07:26

## 2025-06-24 RX ADMIN — FENTANYL CITRATE 25 MCG: 50 INJECTION INTRAMUSCULAR; INTRAVENOUS at 08:24

## 2025-06-24 RX ADMIN — LIDOCAINE HYDROCHLORIDE 50 MG: 10 INJECTION, SOLUTION EPIDURAL; INFILTRATION; INTRACAUDAL at 07:30

## 2025-06-24 RX ADMIN — KETOROLAC TROMETHAMINE 15 MG: 30 INJECTION, SOLUTION INTRAMUSCULAR at 09:08

## 2025-06-24 RX ADMIN — MIDAZOLAM 2 MG: 1 INJECTION INTRAMUSCULAR; INTRAVENOUS at 07:26

## 2025-06-24 RX ADMIN — FENTANYL CITRATE 25 MCG: 50 INJECTION INTRAMUSCULAR; INTRAVENOUS at 09:19

## 2025-06-24 NOTE — TELEPHONE ENCOUNTER
"PT called in, PT s/p Procedures:      REPAIR HERNIA INGUINAL w/ MESH (Right: Groin)      REPAIR HERNIA UMBILICAL (Abdomen) today with Dr. Barr.    PT asking how long he should wear abd binder and purpose. Education provided that he should wear binder as much as possible until until post-op appointment and rationale of wearing binder. PT verbalized understanding and agreeable to plan.     PT also reports decreased sense of taste since coming home from hospital today. Reports awaking today with post-nasal drip. Denies fevers/chills. Education provided that he may be recovering from anesthesia & artificial airway. PT speaking in clear complete sentences throughout call; no wheezing/stridor noted. PT tolerating secretions & eating drinking WO issues.   Advised PT to call back with any new or worsening sxs and if not resolved by tomorrow. PT verbalized understanding and agreeable to plan.       Reason for Disposition  • Patient's symptoms are safe to treat at home per nursing judgment    Answer Assessment - Initial Assessment Questions  1. REASON FOR CALL: \"What is your main concern right now?\"      Decreased sense of taste  2. ONSET: \"When did the sxs start?\"      Few hours ago  3. SEVERITY: \"How bad is the sxs?\"      minor  4. FEVER: \"Do you have a fever?\"      no  5. OTHER SYMPTOMS: \"Do you have any other new symptoms?\"      Post nasal drip  6. TREATMENTS AND RESPONSE: \"What have you done so far to try to make this better? What medicines have you used?\"      nothing  7. PREGNANCY: \"Is there any chance you are pregnant?\" \"When was your last menstrual period?\"      N/a    Protocols used: No Protocol Available-Adult-[place]    "

## 2025-06-24 NOTE — DISCHARGE INSTR - AVS FIRST PAGE
Follow-up with Dr. Barr in 2 weeks as per plan.    Regular diet as tolerated.    Low intense activity as tolerated, no heavy lifting, nothing greater than 10 pounds for 6 weeks.    Dressing may be removed on Thursday.  You may shower on Thursday.  No baths or swimming.    You may restart your Eliquis tomorrow.    If develop fever, nausea vomit, worsening pain, redness or drainage of the incision to call the office or go to the ER.

## 2025-06-24 NOTE — INTERVAL H&P NOTE
H&P reviewed. After examining the patient I find no changes in the patients condition since the H&P had been written.    Vitals:    06/24/25 0637   BP: 125/79   Pulse: 68   Resp: 20   Temp: 98.4 °F (36.9 °C)   SpO2: 96%

## 2025-06-24 NOTE — OP NOTE
OPERATIVE REPORT  PATIENT NAME: Vini Ybarra    :  1960  MRN: [id number]  Pt Location: [facility] OR ROOM 01    SURGERY DATE: 2025    Surgeons and Role:     * Alfonso Barr DO - Primary     * Catrachito Arrington MD - Assisting    Preop Diagnosis:  Right inguinal hernia [K40.90]  Umbilical hernia without obstruction and without gangrene [K42.9]    Post-Op Diagnosis Codes:     * Right inguinal hernia [K40.90]     * Umbilical hernia without obstruction and without gangrene [K42.9]    Procedure(s):  Right - REPAIR HERNIA INGUINAL w/ MESH  REPAIR HERNIA UMBILICAL    Specimen(s):  * No specimens in log *    Estimated Blood Loss:   5 mL    Drains:  * No LDAs found *    Anesthesia Type:   General    Operative Indications:  Right inguinal hernia [K40.90]  Umbilical hernia without obstruction and without gangrene [K42.9]    Operative Findings:  Fat-containing 1x1 cm umbilical hernia and large fat-containing R inguinal hernia.    Prior to opening the fascia, or reducing the contents of the hernia, the hernia defect was measured. The defect measured 1 cm by 1 cm. This was repaired as described in the body of the report below.       Complications:   None    Procedure and Technique:  The patient was prepped and draped in the sterile fashion. An infraumbilical incision was formed and taken down to the fascia. The fat-containing umbilical hernia was carefully reduced back into the abdomen, and the fascia was closed with x2 interrupted figure-of-eight sutures. The umbilical stalk was reattached to the fascia with 2-0 Vicryl. The skin was approximated with interrupted 3-0 Vicryl subcutaneous and then running 4-0 Monocryl subcuticular stitches, dressed with Steri-Strips and 4 x 4's. Local anesthetic was injected around the incision.    Attention was then turned to the R inguinal hernia. Transverse incision was made 2 fingerbreadths above the inguinal crease and carried through skin, subcutaneous tissue, and Rene's fascia. The  external oblique fascia was exposed and incised down to and through the external inguinal ring. The ilioinguinal nerve was dissected and preserved, and the spermatic cord and hernia sac were dissected bluntly off the undersurface of the external oblique fascia exposing the floor of the inguinal canal. The cord was surrounded with a Penrose drain. The hernia sac and large amount of fat inside was  from the cord structures and reduced back into the abdomen. A plug was placed in the internal ring and sutured in place to the inguinal ligament and conjoint tendon using 2-0 vicryl. Then a patch with a defect to accommodate the cord was placed around the cord, and the patch was sutured medially to the pubic tubercle, inferiorly to Gilbert's ligament and inguinal ligaments, and superiorly to conjoined tendon using 2-0 vicryl. The patch's legs were then sutured to themselves with 2-0 vicryl. The area was irrigated with saline. External oblique fascia was closed with a running 2-0 vicryl, Rene's with interrupted 2-0 vicryl, subcu with 3-0 Vicryl, and skin with running 4-0 monocryl and Steri-Strips. Local anesthetic was injected around the area. The cord was returned to normal position. Patient was extubated and taken to the recovery area in stable condition.    Patient Disposition:  PACU          SIGNATURE: Catrachito Arrington MD  DATE: June 24, 2025  TIME: 9:52 AM

## 2025-06-24 NOTE — ANESTHESIA PREPROCEDURE EVALUATION
Procedure:  REPAIR HERNIA INGUINAL w/ MESH (Right: Groin)  REPAIR HERNIA UMBILICAL w/ or w/o MESH (Abdomen)    Relevant Problems   GI/HEPATIC   (+) Esophageal dysphagia   (+) Gastroesophageal reflux disease      /RENAL   (+) BPH without obstruction/lower urinary tract symptoms      HEMATOLOGY   (+) Lupus anticoagulant positive      MUSCULOSKELETAL   (+) Arthritis      NEURO/PSYCH   (+) Chronic hand pain, right   (+) Chronic neck pain   (+) Continuous opioid dependence (HCC)        Physical Exam    Airway     Mallampati score: II  TM Distance: >3 FB  Neck ROM: full      Cardiovascular      Dental       Pulmonary      Neurological      Other Findings        Anesthesia Plan  ASA Score- 3     Anesthesia Type- general with ASA Monitors.         Additional Monitors:     Airway Plan: LMA, Oral ETT and LMA.    Comment: Hx cervical fusion - good ROM.       Plan Factors-Exercise tolerance (METS): >4 METS.    Chart reviewed.                      Induction-     Postoperative Plan- .   Monitoring Plan - Monitoring plan - standard ASA monitoring  Post Operative Pain Plan - multimodal analgesia    Perioperative Resuscitation Plan - Level 1 - Full Code.       Informed Consent- Anesthetic plan and risks discussed with patient.  I personally reviewed this patient with the CRNA. Discussed and agreed on the Anesthesia Plan with the CRNA..      NPO Status:  Vitals Value Taken Time   Date of last liquid 06/24/25 06/24/25 06:38   Time of last liquid 0430 06/24/25 06:38   Date of last solid 06/23/25 06/24/25 06:38   Time of last solid 2330 06/24/25 06:38

## 2025-06-25 NOTE — ANESTHESIA POSTPROCEDURE EVALUATION
Post-Op Assessment Note    CV Status:  Stable  Pain Score: 0    Pain management: adequate    Multimodal analgesia used between 6 hours prior to anesthesia start to PACU discharge    Mental Status:  Alert   Hydration Status:  Stable   PONV Controlled:  Controlled   Airway Patency:  Patent  Two or more mitigation strategies used for obstructive sleep apnea   Post Op Vitals Reviewed: Yes    No anethesia notable event occurred.    Staff: Anesthesiologist           Last Filed PACU Vitals:  Vitals Value Taken Time   Temp 97.6 °F (36.4 °C) 06/24/25 10:07   Pulse 57 06/24/25 10:14   /65 06/24/25 10:12   Resp 11 06/24/25 10:14   SpO2 95 % 06/24/25 10:14   Vitals shown include unfiled device data.    Modified Maxi:     Vitals Value Taken Time   Activity 2 06/24/25 10:07   Respiration 2 06/24/25 10:07   Circulation 2 06/24/25 10:07   Consciousness 2 06/24/25 10:07   Oxygen Saturation 2 06/24/25 10:07     Modified Maxi Score: 10            
Post-Op Assessment Note    CV Status:  Stable  Pain Score: 0    Pain management: satisfactory to patient       Mental Status:  Awake and sleepy   Hydration Status:  Euvolemic   PONV Controlled:  Controlled   Airway Patency:  Patent     Post Op Vitals Reviewed: Yes    No anethesia notable event occurred.    Staff: CRNA           Last Filed PACU Vitals:  Vitals Value Taken Time   Temp 98.5    Pulse 56 06/24/25 09:37   /67 06/24/25 09:36   Resp 11 06/24/25 09:37   SpO2 98 % 06/24/25 09:37   Vitals shown include unfiled device data.           
no

## 2025-06-27 ENCOUNTER — APPOINTMENT (EMERGENCY)
Dept: NON INVASIVE DIAGNOSTICS | Facility: HOSPITAL | Age: 65
End: 2025-06-27
Payer: COMMERCIAL

## 2025-06-27 ENCOUNTER — HOSPITAL ENCOUNTER (EMERGENCY)
Facility: HOSPITAL | Age: 65
Discharge: HOME | End: 2025-06-27
Attending: EMERGENCY MEDICINE
Payer: COMMERCIAL

## 2025-06-27 ENCOUNTER — NURSE TRIAGE (OUTPATIENT)
Age: 65
End: 2025-06-27

## 2025-06-27 VITALS
TEMPERATURE: 97.6 F | DIASTOLIC BLOOD PRESSURE: 79 MMHG | RESPIRATION RATE: 16 BRPM | HEART RATE: 65 BPM | OXYGEN SATURATION: 95 % | SYSTOLIC BLOOD PRESSURE: 125 MMHG

## 2025-06-27 DIAGNOSIS — Z86.718 HISTORY OF DVT (DEEP VEIN THROMBOSIS): ICD-10-CM

## 2025-06-27 DIAGNOSIS — Z51.81 ALTERATION IN ANTICOAGULATION: ICD-10-CM

## 2025-06-27 DIAGNOSIS — M79.661 RIGHT CALF PAIN: Primary | ICD-10-CM

## 2025-06-27 DIAGNOSIS — Z79.01 ALTERATION IN ANTICOAGULATION: ICD-10-CM

## 2025-06-27 PROCEDURE — 99283 EMERGENCY DEPT VISIT LOW MDM: CPT

## 2025-06-27 PROCEDURE — 93971 EXTREMITY STUDY: CPT | Performed by: SURGERY

## 2025-06-27 PROCEDURE — 99283 EMERGENCY DEPT VISIT LOW MDM: CPT | Performed by: EMERGENCY MEDICINE

## 2025-06-27 PROCEDURE — 93971 EXTREMITY STUDY: CPT

## 2025-06-27 NOTE — ED PROVIDER NOTES
Time reflects when diagnosis was documented in both MDM as applicable and the Disposition within this note       Time User Action Codes Description Comment    6/27/2025  2:01 PM Perez Williamson [M79.661] Right calf pain     6/27/2025  2:01 PM Perez Williamson Add [Z86.718] History of DVT (deep vein thrombosis)     6/27/2025  2:02 PM Perez Williamson Add [Z51.81,  Z79.01] Alteration in anticoagulation     6/27/2025  2:02 PM Perez Williamson Modify [Z51.81,  Z79.01] Alteration in anticoagulation Held during procedure placing patient at risk for new thromboembolic event          ED Disposition       ED Disposition   Discharge    Condition   Stable    Date/Time   Fri Jun 27, 2025  4:01 PM    Comment   Vini Ybarra discharge to home/self care.                   Assessment & Plan       Medical Decision Making  64-year-old male right calf pain differential diagnosis includes DVT, muscle strain, ligamentous sprain, other overuse injury, Baker's cyst, superficial thrombophlebitis, cellulitis.  Exam is not consistent with Baker's cyst, superficial thrombophlebitis or cellulitis.  Patient states history of DVT on Eliquis chronically but held during the procedure.  Will evaluate with ultrasound.  If no evidence of new large significant DVT requiring other intervention or change in anticoagulation plan will have patient continue Eliquis and manage as supportive care with expected improvement and possible musculoskeletal cause.    Problems Addressed:  Alteration in anticoagulation: self-limited or minor problem  History of DVT (deep vein thrombosis): chronic illness or injury  Right calf pain: acute illness or injury        ED Course as of 06/27/25 1603   Fri Jun 27, 2025   1602 Per vascular tech performing study, no evidence of DVT       Medications - No data to display    ED Risk Strat Scores                    No data recorded        SBIRT 22yo+      Flowsheet Row Most Recent Value   Initial Alcohol Screen: US  AUDIT-C     1. How often do you have a drink containing alcohol? 0 Filed at: 06/27/2025 1342   2. How many drinks containing alcohol do you have on a typical day you are drinking?  0 Filed at: 06/27/2025 1342   3a. Male UNDER 65: How often do you have five or more drinks on one occasion? 0 Filed at: 06/27/2025 1342   3b. FEMALE Any Age, or MALE 65+: How often do you have 4 or more drinks on one occassion? 0 Filed at: 06/27/2025 1342   Audit-C Score 0 Filed at: 06/27/2025 1342   TORSTEN: How many times in the past year have you...    Used an illegal drug or used a prescription medication for non-medical reasons? Never Filed at: 06/27/2025 1342                            History of Present Illness       Chief Complaint   Patient presents with    Leg Pain     Recent hernia surgery on Tuesday, right calf pain started Wednesday. Denies injury. Dvt hx.        Past Medical History[1]   Past Surgical History[2]   Family History[3]   Social History[4]   E-Cigarette/Vaping    E-Cigarette Use Never User       E-Cigarette/Vaping Substances    Nicotine No     THC No     CBD No     Flavoring No     Other No     Unknown No       I have reviewed and agree with the history as documented.     64-year-old male with a past medical history significant for cervical radiculopathy with myelopathy, history of spinal cord injury, lumbar spinal stenosis, lumbar radiculopathy, history of VTE on Eliquis, recent inguinal hernia repair on 6/24/2025, 3 days ago, held anticoagulation for the procedure, presenting with right calf pain which began the day after surgery.  Patient called office and was referred to ER to rule out DVT in the setting of holding anticoagulation for surgical procedure.  Patient restarted anticoagulation yesterday.  No other complaints.            Leg Pain  Associated symptoms: no back pain and no fever        Review of Systems   Constitutional:  Negative for chills and fever.   HENT:  Negative for ear pain and sore throat.     Eyes:  Negative for pain and visual disturbance.   Respiratory:  Negative for cough and shortness of breath.    Cardiovascular:  Negative for chest pain and palpitations.   Gastrointestinal:  Negative for abdominal pain and vomiting.   Genitourinary:  Negative for dysuria and hematuria.   Musculoskeletal:  Negative for arthralgias and back pain.        Right calf pain   Skin:  Negative for color change and rash.   Neurological:  Negative for seizures and syncope.   All other systems reviewed and are negative.          Objective       ED Triage Vitals   Temperature Pulse Blood Pressure Respirations SpO2 Patient Position - Orthostatic VS   06/27/25 1343 06/27/25 1347 06/27/25 1347 06/27/25 1343 06/27/25 1347 06/27/25 1347   97.6 °F (36.4 °C) 68 170/99 18 99 % Lying      Temp Source Heart Rate Source BP Location FiO2 (%) Pain Score    06/27/25 1343 06/27/25 1347 06/27/25 1347 -- 06/27/25 1343    Temporal Monitor Left arm  2      Vitals      Date and Time Temp Pulse SpO2 Resp BP Pain Score FACES Pain Rating User   06/27/25 1530 -- 65 95 % 16 125/79 -- -- [facility]   06/27/25 1500 -- 59 96 % 16 146/71 -- -- [facility]   06/27/25 1400 -- 67 97 % 16 156/75 -- -- [facility]   06/27/25 1349 -- 68 98 % -- 170/99 -- -- [facility]   06/27/25 1347 -- 68 99 % -- 170/99 -- -- [facility]   06/27/25 1343 97.6 °F (36.4 °C) -- -- 18 -- 2 -- [facility]            Physical Exam  Vitals and nursing note reviewed.   Constitutional:       General: He is not in acute distress.     Appearance: He is well-developed.   HENT:      Head: Normocephalic and atraumatic.     Eyes:      Conjunctiva/sclera: Conjunctivae normal.       Cardiovascular:      Rate and Rhythm: Normal rate and regular rhythm.      Pulses:           Popliteal pulses are 2+ on the right side and 2+ on the left side.        Dorsalis pedis pulses are 2+ on the right side and 2+ on the left side.        Posterior tibial pulses are 2+ on the right side and 2+ on the left side.      Heart sounds: No murmur  heard.  Pulmonary:      Effort: Pulmonary effort is normal. No respiratory distress.      Breath sounds: Normal breath sounds.   Abdominal:      Palpations: Abdomen is soft.      Tenderness: There is no abdominal tenderness.     Musculoskeletal:         General: No swelling or tenderness.      Cervical back: Neck supple.      Right knee: Normal.      Right lower leg: Normal. No edema.      Left lower leg: No edema.      Right ankle: Normal.      Right Achilles Tendon: Normal.     Skin:     General: Skin is warm and dry.      Capillary Refill: Capillary refill takes less than 2 seconds.     Neurological:      Mental Status: He is alert.     Psychiatric:         Mood and Affect: Mood normal.         Results Reviewed       None            VAS lower limb venous duplex study, unilateral/limited    (Results Pending)       Procedures    ED Medication and Procedure Management   Prior to Admission Medications   Prescriptions Last Dose Informant Patient Reported? Taking?   Multiple Vitamins-Minerals (ZINC PO) 6/26/2025  Yes Yes   Sig: Take by mouth   albuterol (Ventolin HFA) 90 mcg/act inhaler   No No   Sig: Inhale 2 puffs every 6 (six) hours as needed for wheezing   apixaban (ELIQUIS) 2.5 mg 6/27/2025  No Yes   Sig: Take 1 tablet (2.5 mg total) by mouth 2 (two) times a day   gabapentin (NEURONTIN) 300 mg capsule Not Taking Self Yes No   Sig: Take 300 mg by mouth as needed in the morning and 300 mg as needed at noon and 300 mg as needed in the evening.   Patient not taking: Reported on 6/27/2025   ketoconazole (NIZORAL) 2 % cream Not Taking Self Yes No   Sig: as needed   Patient not taking: Reported on 6/27/2025   naproxen (Naprosyn) 500 mg tablet   No No   Sig: Take 1 tablet (500 mg total) by mouth 2 (two) times a day with meals for 5 days   Patient taking differently: Take 500 mg by mouth as needed   omeprazole (PriLOSEC) 20 mg delayed release capsule Unknown  No No   Sig: take 1 capsule by mouth once daily   Patient taking  differently: Take 20 mg by mouth as needed   oxyCODONE (OXY-IR) 5 MG capsule 6/26/2025  No Yes   Sig: Take 1 capsule (5 mg total) by mouth 2 (two) times a day as needed for moderate pain Max Daily Amount: 10 mg   oxyCODONE (Roxicodone) 5 immediate release tablet Unknown  No No   Sig: Take 1 tablet (5 mg total) by mouth every 4 (four) hours as needed for moderate pain Max Daily Amount: 30 mg   rosuvastatin (CRESTOR) 5 mg tablet Not Taking Self No No   Sig: Take 1 tablet (5 mg total) by mouth daily   Patient not taking: Reported on 6/27/2025      Facility-Administered Medications: None     Patient's Medications   Discharge Prescriptions    No medications on file     No discharge procedures on file.  ED SEPSIS DOCUMENTATION   Time reflects when diagnosis was documented in both MDM as applicable and the Disposition within this note       Time User Action Codes Description Comment    6/27/2025  2:01 PM Perez Williamson [M79.661] Right calf pain     6/27/2025  2:01 PM Perez Williamson [Z86.718] History of DVT (deep vein thrombosis)     6/27/2025  2:02 PM Perez Williamson Add [Z51.81,  Z79.01] Alteration in anticoagulation     6/27/2025  2:02 PM Perez Williamson Modify [Z51.81,  Z79.01] Alteration in anticoagulation Held during procedure placing patient at risk for new thromboembolic event                     [1]   Past Medical History:  Diagnosis Date    Arthritis     Benign prostatic hyperplasia with incomplete bladder emptying 02/28/2024    Bilateral pulmonary embolism (HCC) 06/23/2024    Chronic pain disorder     COVID 2021    GERD (gastroesophageal reflux disease)     Spinal cord dysplasia (HCC)    [2]   Past Surgical History:  Procedure Laterality Date    CERVICAL FUSION      COLONOSCOPY      EGD      HERNIA REPAIR Left     NASAL SEPTOPLASTY W/ TURBINOPLASTY  08/22/2003    WY BX PROSTATE STRTCTC SATURATION SAMPLING IMG GID N/A 11/22/2023    Procedure: TRANSPERINEAL ULTRASOUND GUIDED BIOPSY  PROSTATE;  Surgeon: Luis Antonio Rodrigues MD;  Location: [facility];  Service: Urology    ID RPR 1ST INGUN HRNA AGE 5 YRS/> REDUCIBLE Right 6/24/2025    Procedure: REPAIR HERNIA INGUINAL w/ MESH;  Surgeon: Alfonso Barr DO;  Location: [facility];  Service: General    ID RPR AA HERNIA 1ST 3-10 CM REDUCIBLE N/A 6/24/2025    Procedure: REPAIR HERNIA UMBILICAL;  Surgeon: Alfonso Barr DO;  Location: [facility];  Service: General    ID TRURL ELECTROSURG RESCJ PROSTATE BLEED COMPLETE N/A 02/28/2024    Procedure: TRANSURETHRAL RESECTION OF PROSTATE (TURP);  Surgeon: Luis Antonio Rodrigues MD;  Location: [facility];  Service: Urology    TOOTH EXTRACTION     [3]   Family History  Problem Relation Name Age of Onset    Cancer Mother      Cancer Father      Lung cancer Brother      Cancer Son     [4]   Social History  Tobacco Use    Smoking status: Former     Average packs/day: 0.5 packs/day for 40.0 years (20.0 ttl pk-yrs)     Types: Cigarettes     Start date: 1974     Passive exposure: Past    Smokeless tobacco: Never   Vaping Use    Vaping status: Never Used   Substance Use Topics    Alcohol use: Yes     Comment: couple beers a week    Drug use: Not Currently     Types: Marijuana     Comment: 50 years ago        Perez Williamson DO  06/27/25 [id number]

## 2025-06-27 NOTE — TELEPHONE ENCOUNTER
"REASON FOR CONVERSATION: Post-op Problem    SYMPTOMS: Persistent pain in posterior right calf x 2 days. Does not remember any specific injury or cramp.    OTHER HEALTH INFORMATION: hx of DVT. Patient was holding Eliquis prior to surgery on 6/24.    PROTOCOL DISPOSITION: Go to ED/UCC Now (Or to Office with PCP Approval)    CARE ADVICE PROVIDED: Advised to visit ED for DVT eval    PRACTICE FOLLOW-UP: post op visit on 7/9. Patient will contact office after ED if any additional follow up needed    #[phone]      Reason for Disposition   History of prior 'blood clot' in leg or lungs (i.e., deep vein thrombosis, pulmonary embolism)    Answer Assessment - Initial Assessment Questions  1. ONSET: \"When did the pain start?\"       2 days ago  2. LOCATION: \"Where is the pain located?\"       Posterior right calf  3. PAIN: \"How bad is the pain?\"    (Scale 1-10; or mild, moderate, severe)      mild  4. WORK OR EXERCISE: \"Has there been any recent work or exercise that involved this part of the body?\"       denies  5. CAUSE: \"What do you think is causing the leg pain?\"      Unsure, hx of DVT  6. OTHER SYMPTOMS: \"Do you have any other symptoms?\" (e.g., chest pain, back pain, breathing difficulty, swelling, rash, fever, numbness, weakness)      denies  7. PREGNANCY: \"Is there any chance you are pregnant?\" \"When was your last menstrual period?\"      N/a    Protocols used: Leg Pain-Adult-[place]    "

## 2025-06-27 NOTE — TELEPHONE ENCOUNTER
REASON FOR CONVERSATION: Post-op Problem    SYMPTOMS: calf pain with redness/swelling. Hx DVT. Held anti-coagulant, but restarted yesterday.    OTHER HEALTH INFORMATION: post-op day 3 Procedures:      REPAIR HERNIA INGUINAL w/ MESH (Right: Groin)      REPAIR HERNIA UMBILICAL (Abdomen) with Dr. Barr    PROTOCOL DISPOSITION: Go to ED/UCC Now (Or to Office with PCP Approval)    CARE ADVICE PROVIDED: during triage, RN lost electricity and call dropped.     PRACTICE FOLLOW-UP: n/a

## 2025-06-27 NOTE — DISCHARGE INSTRUCTIONS
Thank you for visiting the Emergency Department today.    No evidence of DVT in the right leg  Symptoms likely from some other cause like muscle strain or cramp  Fluids, rest, tylenol, ibuprofen, light activity as tolerated  Return if worsening, not improving or other concerns for re-evaluation.

## 2025-06-30 NOTE — TELEPHONE ENCOUNTER
Pt calling in to report he was seen in ER and was negative for DVT but is having concerns about incision and states no one checked in the ER. Pt reports his pain is starting to subside overall but wants to have incision checked.   Scheduled for soonest appointment, 7/2.    Pt thankful, no other questions.

## 2025-07-02 ENCOUNTER — OFFICE VISIT (OUTPATIENT)
Dept: SURGERY | Facility: CLINIC | Age: 65
End: 2025-07-02
Payer: COMMERCIAL

## 2025-07-02 VITALS
WEIGHT: 196.2 LBS | DIASTOLIC BLOOD PRESSURE: 68 MMHG | SYSTOLIC BLOOD PRESSURE: 104 MMHG | OXYGEN SATURATION: 97 % | TEMPERATURE: 98.4 F | HEART RATE: 66 BPM | BODY MASS INDEX: 28.09 KG/M2 | HEIGHT: 70 IN

## 2025-07-02 DIAGNOSIS — K40.90 RIGHT INGUINAL HERNIA: Primary | ICD-10-CM

## 2025-07-02 DIAGNOSIS — K42.9 UMBILICAL HERNIA WITHOUT OBSTRUCTION AND WITHOUT GANGRENE: ICD-10-CM

## 2025-07-02 PROCEDURE — 99212 OFFICE O/P EST SF 10 MIN: CPT | Performed by: SURGERY

## 2025-07-02 PROCEDURE — 99024 POSTOP FOLLOW-UP VISIT: CPT | Performed by: SURGERY

## 2025-07-02 NOTE — ASSESSMENT & PLAN NOTE
Status post right inguinal hernia.  Patient presented today for concerns of a lump in the right groin.  On exam this is expected postoperative firmness and changes secondary to the operation.  No evidence of any significant ecchymosis to suggest bruising or underlying hematoma.  No significant pain.  No evidence of recurrence.  Follow-up in 5 weeks.  Continue IV lefty.

## 2025-07-02 NOTE — PROGRESS NOTES
Name: Vini Ybarra      : 1960      MRN: [id number]  Encounter Provider: Alfonso Barr DO  Encounter Date: 2025   Encounter department: [facility]  :  Assessment & Plan  Right inguinal hernia  Status post right inguinal hernia.  Patient presented today for concerns of a lump in the right groin.  On exam this is expected postoperative firmness and changes secondary to the operation.  No evidence of any significant ecchymosis to suggest bruising or underlying hematoma.  No significant pain.  No evidence of recurrence.  Follow-up in 5 weeks.  Continue IV lefty.         Umbilical hernia without obstruction and without gangrene  Status post primary periumbilical hernia.  Incision healing well.  No complaints.  Continue no other lifting for an additional 5 weeks.  Follow-up in 5 weeks.             History of Present Illness   HPI  Vini Ybarra is a 64 y.o. male who presents today for postop check.  Patient was worried because he felt some firmness and a bulge in the right groin.  No significant pain.  Overall improving.  No fevers or chills.  No other associate symptoms.    History obtained from: patient    Review of Systems  Past Medical History   Past Medical History[1]  Past Surgical History[2]  Family History[3]   reports that he has quit smoking. His smoking use included cigarettes. He started smoking about 51 years ago. He has a 20 pack-year smoking history. He has been exposed to tobacco smoke. He has never used smokeless tobacco. He reports current alcohol use. He reports that he does not currently use drugs after having used the following drugs: Marijuana.  Current Outpatient Medications   Medication Instructions    albuterol (Ventolin HFA) 90 mcg/act inhaler 2 puffs, Inhalation, Every 6 hours PRN    apixaban (ELIQUIS) 2.5 mg, Oral, 2 times daily    gabapentin (NEURONTIN) 300 mg, 3 times daily PRN    ketoconazole (NIZORAL) 2 % cream As needed    Multiple Vitamins-Minerals  "(ZINC PO) Take by mouth    naproxen (NAPROSYN) 500 mg, Oral, 2 times daily with meals    omeprazole (PRILOSEC) 20 mg, Oral, Daily    oxyCODONE (OXY-IR) 5 mg, Oral, 2 times daily PRN    oxyCODONE (ROXICODONE) 5 mg, Oral, Every 4 hours PRN    rosuvastatin (CRESTOR) 5 mg, Oral, Daily   Allergies[4]   Medications Ordered Prior to Encounter[5]   Social History[6]     Objective   /68 (BP Location: Left arm, Patient Position: Sitting, Cuff Size: Standard)   Pulse 66   Temp 98.4 °F (36.9 °C) (Temporal)   Ht 5' 10\" (1.778 m)   Wt 89 kg (196 lb 3.2 oz)   SpO2 97%   BMI 28.15 kg/m²      Physical Exam  Constitutional:       General: He is not in acute distress.     Appearance: Normal appearance. He is not ill-appearing, toxic-appearing or diaphoretic.   Abdominal:      Comments: Right inguinal incision clean dry intact with Steri-Strips in place.  Mild firmness beneath the incision consistent with postoperative changes.  No significant pain.  No evidence of recurrence of hernia.  No significant ecchymosis.    Umbilical hernia site clean dry intact with Steri-Strips in place.     Skin:     General: Skin is warm.      Coloration: Skin is not jaundiced or pale.      Findings: No bruising or erythema.     Neurological:      Mental Status: He is alert.                [1]   Past Medical History:  Diagnosis Date    Arthritis     Benign prostatic hyperplasia with incomplete bladder emptying 02/28/2024    Bilateral pulmonary embolism (HCC) 06/23/2024    Chronic pain disorder     COVID 2021    GERD (gastroesophageal reflux disease)     Spinal cord dysplasia (HCC)    [2]   Past Surgical History:  Procedure Laterality Date    CERVICAL FUSION      COLONOSCOPY      EGD      HERNIA REPAIR Left     NASAL SEPTOPLASTY W/ TURBINOPLASTY  08/22/2003    MN BX PROSTATE STRTCTC SATURATION SAMPLING IMG GID N/A 11/22/2023    Procedure: TRANSPERINEAL ULTRASOUND GUIDED BIOPSY PROSTATE;  Surgeon: Luis Antonio Rodrigues MD;  Location: [facility];  " Service: Urology    KY RPR 1ST INGUN HRNA AGE 5 YRS/> REDUCIBLE Right 6/24/2025    Procedure: REPAIR HERNIA INGUINAL w/ MESH;  Surgeon: Alfonso Barr DO;  Location: [facility];  Service: General    KY RPR AA HERNIA 1ST 3-10 CM REDUCIBLE N/A 6/24/2025    Procedure: REPAIR HERNIA UMBILICAL;  Surgeon: Alfonso Barr DO;  Location: [facility];  Service: General    KY TRURL ELECTROSURG RESCJ PROSTATE BLEED COMPLETE N/A 02/28/2024    Procedure: TRANSURETHRAL RESECTION OF PROSTATE (TURP);  Surgeon: Luis Antonio Rodrigues MD;  Location: [facility];  Service: Urology    TOOTH EXTRACTION     [3]   Family History  Problem Relation Name Age of Onset    Cancer Mother      Cancer Father      Lung cancer Brother      Cancer Son     [4]   Allergies  Allergen Reactions    Acetaminophen Other (See Comments)     Other reaction(s): TONGUE LIPS NOSE TINGLING SENSAT     [5]   Current Outpatient Medications on File Prior to Visit   Medication Sig Dispense Refill    albuterol (Ventolin HFA) 90 mcg/act inhaler Inhale 2 puffs every 6 (six) hours as needed for wheezing 18 g 5    apixaban (ELIQUIS) 2.5 mg Take 1 tablet (2.5 mg total) by mouth 2 (two) times a day 60 tablet 5    gabapentin (NEURONTIN) 300 mg capsule Take 300 mg by mouth as needed in the morning and 300 mg as needed at noon and 300 mg as needed in the evening. (Patient not taking: Reported on 6/27/2025)      ketoconazole (NIZORAL) 2 % cream as needed (Patient not taking: Reported on 6/27/2025)      Multiple Vitamins-Minerals (ZINC PO) Take by mouth      naproxen (Naprosyn) 500 mg tablet Take 1 tablet (500 mg total) by mouth 2 (two) times a day with meals for 5 days (Patient taking differently: Take 500 mg by mouth as needed) 10 tablet 0    omeprazole (PriLOSEC) 20 mg delayed release capsule take 1 capsule by mouth once daily (Patient taking differently: Take 20 mg by mouth as needed) 100 capsule 1    oxyCODONE (OXY-IR) 5 MG capsule Take 1 capsule (5 mg total) by mouth 2 (two) times a day  as needed for moderate pain Max Daily Amount: 10 mg 60 capsule 0    oxyCODONE (Roxicodone) 5 immediate release tablet Take 1 tablet (5 mg total) by mouth every 4 (four) hours as needed for moderate pain Max Daily Amount: 30 mg 20 tablet 0    rosuvastatin (CRESTOR) 5 mg tablet Take 1 tablet (5 mg total) by mouth daily (Patient not taking: Reported on 6/27/2025) 90 tablet 3     No current facility-administered medications on file prior to visit.   [6]   Social History  Tobacco Use    Smoking status: Former     Average packs/day: 0.5 packs/day for 40.0 years (20.0 ttl pk-yrs)     Types: Cigarettes     Start date: 1974     Passive exposure: Past    Smokeless tobacco: Never   Vaping Use    Vaping status: Never Used   Substance and Sexual Activity    Alcohol use: Yes     Comment: couple beers a week    Drug use: Not Currently     Types: Marijuana     Comment: 50 years ago    Sexual activity: Not Currently

## 2025-07-02 NOTE — ASSESSMENT & PLAN NOTE
Status post primary periumbilical hernia.  Incision healing well.  No complaints.  Continue no other lifting for an additional 5 weeks.  Follow-up in 5 weeks.

## 2025-07-10 ENCOUNTER — TELEPHONE (OUTPATIENT)
Dept: SURGERY | Facility: CLINIC | Age: 65
End: 2025-07-10

## 2025-07-10 ENCOUNTER — OFFICE VISIT (OUTPATIENT)
Dept: PULMONOLOGY | Facility: CLINIC | Age: 65
End: 2025-07-10
Payer: COMMERCIAL

## 2025-07-10 VITALS
WEIGHT: 199.6 LBS | SYSTOLIC BLOOD PRESSURE: 129 MMHG | DIASTOLIC BLOOD PRESSURE: 81 MMHG | TEMPERATURE: 98.3 F | OXYGEN SATURATION: 97 % | HEART RATE: 64 BPM | BODY MASS INDEX: 28.58 KG/M2 | HEIGHT: 70 IN

## 2025-07-10 DIAGNOSIS — J98.6 ELEVATED HEMIDIAPHRAGM: ICD-10-CM

## 2025-07-10 DIAGNOSIS — I26.99 BILATERAL PULMONARY EMBOLISM (HCC): Primary | ICD-10-CM

## 2025-07-10 PROCEDURE — 99214 OFFICE O/P EST MOD 30 MIN: CPT

## 2025-07-10 NOTE — PROGRESS NOTES
Follow-up  Visit - Pulmonary Medicine   Name: Vini Ybarra      : 1960      MRN: [id number]  Encounter Provider: LILIANA Giron  Encounter Date: 7/10/2025   Encounter department: [facility] [place]  :  Assessment & Plan  Bilateral pulmonary embolism (HCC)  - Hospitalized 2024 with bilateral PE without right heart strain  -Currently without any pulmonary symptoms of shortness of breath or chest pain.  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  Most recent CT chest  with left hemidiaphragm elevation, suspect possibly secondary to platelike atelectasis in LLL  -Currently without any pulmonary symptoms of shortness of breath, cough, or mucus. LLL sounds are slightly diminished compared to right  -He has a repeat CT chest scheduled for follow-up next month. Encouraged continued use of incentive spirometer.  Patient will notify if he develops any new or worsening shortness of breath.           Return in about 1 year (around 7/10/2026).    History of Present Illness   Vini Ybarra is a 64 y.o. male with a past medical history of DVT 10 years ago, pulmonary embolism 2024, GERD, osteoarthritis, chronic opioid dependence, myelomalacia of the cervical cord s/p cervical fusion 10 years ago, and recent right inguinal hernia repair.  He is here today for a follow-up visit.  He has been maintained on low-dose Eliquis lifelong due to his history of 2 separate VTE's.  Seen by hematology in March who agreed with this.  He had recent ED visit 2 weeks ago for right calf pain. In the setting of recent surgery temporarily off Eliquis he had a LE duplex performed which was negative for acute DVT.    Patient returns today, denies any shortness of breath, cough, mucus, wheezing, chest pain, or lower extremity swelling.  Taking low-dose Eliquis twice daily as prescribed.   "He has a repeat CT chest scheduled next month to follow-up on left lower lobe atelectasis and elevated left hemidiaphragm.      Review of Systems    Aside from what is mentioned in the HPI, ROS is otherwise negative         Medical History Reviewed by provider this encounter:  Tobacco  Allergies  Meds  Problems  Med Hx  Surg Hx  Fam Hx     .    Objective   /81   Pulse 64   Temp 98.3 °F (36.8 °C) (Temporal)   Ht 5' 10\" (1.778 m)   Wt 90.5 kg (199 lb 9.6 oz)   SpO2 97%   BMI 28.64 kg/m²     Physical Exam  Vitals and nursing note reviewed.   Constitutional:       General: He is not in acute distress.     Appearance: Normal appearance. He is well-developed.     Cardiovascular:      Rate and Rhythm: Normal rate and regular rhythm.      Heart sounds: Normal heart sounds. No murmur heard.  Pulmonary:      Effort: Pulmonary effort is normal. No respiratory distress.      Breath sounds: Examination of the left-lower field reveals decreased breath sounds. Decreased breath sounds present. No wheezing, rhonchi or rales.     Musculoskeletal:         General: No swelling.      Right lower leg: No edema.      Left lower leg: No edema.     Psychiatric:         Mood and Affect: Mood and affect normal.         Behavior: Behavior normal. Behavior is cooperative.           Diagnostic Data:  Labs: I personally reviewed the most recent laboratory data pertinent to today's visit.      Radiology results:  Radiology Results Review: I have reviewed radiology reports from 4/25/2025 including: CT chest.      PFT/spirometry results:  No results found for: \"FEV1\", \"FVC\", \"OBJ5PEF\", \"TLC\", \"DLCO\"       Oximetry testing:      Other studies:      LILIANA Giron      "

## 2025-07-10 NOTE — TELEPHONE ENCOUNTER
"REASON FOR CONVERSATION: Post-op Problem    SYMPTOMS: Right groin pain that is \"stabbing\" at times. Patient is 2 weeks out from surgery. Explained that he is still healing. Scar tissue is forming.    OTHER HEALTH INFORMATION: REPAIR HERNIA INGUINAL w/ MESH (Right: Groin) REPAIR HERNIA UMBILICAL (Abdomen) done on 6/24/25 by     PROTOCOL DISPOSITION: Information or Advice Only Call    CARE ADVICE PROVIDED: May use ice prn as needed as well as Tylenol or Ibuprofen    PRACTICE FOLLOW-UP: Patient will call back in a few weeks or sooner if pain persists or worsens        "

## 2025-07-15 ENCOUNTER — TELEPHONE (OUTPATIENT)
Dept: FAMILY MEDICINE CLINIC | Facility: CLINIC | Age: 65
End: 2025-07-15

## 2025-07-28 ENCOUNTER — TELEPHONE (OUTPATIENT)
Age: 65
End: 2025-07-28

## 2025-08-01 ENCOUNTER — TELEPHONE (OUTPATIENT)
Age: 65
End: 2025-08-01

## 2025-08-01 ENCOUNTER — HOSPITAL ENCOUNTER (OUTPATIENT)
Dept: CT IMAGING | Facility: HOSPITAL | Age: 65
Discharge: HOME | End: 2025-08-01
Attending: INTERNAL MEDICINE
Payer: COMMERCIAL

## 2025-08-01 DIAGNOSIS — I26.99 BILATERAL PULMONARY EMBOLISM (HCC): ICD-10-CM

## 2025-08-01 DIAGNOSIS — R93.89 ABNORMAL CAT SCAN: ICD-10-CM

## 2025-08-01 PROCEDURE — 71250 CT THORAX DX C-: CPT

## 2025-08-04 ENCOUNTER — OFFICE VISIT (OUTPATIENT)
Dept: SURGERY | Facility: CLINIC | Age: 65
End: 2025-08-04

## 2025-08-04 VITALS
HEIGHT: 70 IN | WEIGHT: 200 LBS | TEMPERATURE: 98.3 F | OXYGEN SATURATION: 96 % | HEART RATE: 83 BPM | DIASTOLIC BLOOD PRESSURE: 70 MMHG | BODY MASS INDEX: 28.63 KG/M2 | SYSTOLIC BLOOD PRESSURE: 148 MMHG

## 2025-08-04 DIAGNOSIS — K40.90 RIGHT INGUINAL HERNIA: Primary | ICD-10-CM

## 2025-08-04 PROCEDURE — 99024 POSTOP FOLLOW-UP VISIT: CPT | Performed by: SURGERY

## 2025-08-07 ENCOUNTER — OFFICE VISIT (OUTPATIENT)
Dept: FAMILY MEDICINE CLINIC | Facility: CLINIC | Age: 65
End: 2025-08-07
Payer: COMMERCIAL

## 2025-08-07 VITALS
DIASTOLIC BLOOD PRESSURE: 88 MMHG | HEART RATE: 75 BPM | SYSTOLIC BLOOD PRESSURE: 126 MMHG | BODY MASS INDEX: 28.5 KG/M2 | TEMPERATURE: 97.9 F | WEIGHT: 198.6 LBS | OXYGEN SATURATION: 96 %

## 2025-08-07 DIAGNOSIS — Z86.711 PERSONAL HISTORY OF PULMONARY EMBOLISM: ICD-10-CM

## 2025-08-07 DIAGNOSIS — J98.6 ELEVATED DIAPHRAGM: ICD-10-CM

## 2025-08-07 DIAGNOSIS — K21.9 GASTROESOPHAGEAL REFLUX DISEASE, UNSPECIFIED WHETHER ESOPHAGITIS PRESENT: ICD-10-CM

## 2025-08-07 DIAGNOSIS — G95.89 MYELOMALACIA OF CERVICAL CORD (HCC): Primary | ICD-10-CM

## 2025-08-07 DIAGNOSIS — R76.0 LUPUS ANTICOAGULANT POSITIVE: ICD-10-CM

## 2025-08-07 PROBLEM — K40.90 RIGHT INGUINAL HERNIA: Status: RESOLVED | Noted: 2025-05-19 | Resolved: 2025-08-07

## 2025-08-07 PROCEDURE — G2211 COMPLEX E/M VISIT ADD ON: HCPCS | Performed by: INTERNAL MEDICINE

## 2025-08-07 PROCEDURE — 99214 OFFICE O/P EST MOD 30 MIN: CPT | Performed by: INTERNAL MEDICINE

## 2025-08-07 RX ORDER — NAPROXEN 500 MG/1
500 TABLET ORAL 2 TIMES DAILY WITH MEALS
Start: 2025-08-07 | End: 2025-08-12

## 2025-08-07 RX ORDER — OMEPRAZOLE 20 MG/1
20 CAPSULE, DELAYED RELEASE ORAL AS NEEDED
Start: 2025-08-07

## (undated) DEVICE — CATH FOLEY COUDE HEMATURIA 24FR 30ML 3 WAY LUBRICATH

## (undated) DEVICE — 3M™ TEGADERM™ TRANSPARENT FILM DRESSING FRAME STYLE, 1628, 6 IN X 8 IN (15 CM X 20 CM), 10/CT 8CT/CASE: Brand: 3M™ TEGADERM™

## (undated) DEVICE — 3M™ MICROFOAM™ SURGICAL TAPE 4 ROLLS/CARTON 6 CARTONS/CASE 1528-3: Brand: 3M™ MICROFOAM™

## (undated) DEVICE — SUT MONOCRYL 4-0 PS-2 27 IN Y426H

## (undated) DEVICE — MAX-CORE® DISPOSABLE CORE BIOPSY INSTRUMENT, 18G X 25CM: Brand: MAX-CORE

## (undated) DEVICE — PAD GROUNDING DUAL ADULT

## (undated) DEVICE — SUT VICRYL 2-0 SH 27 IN UNDYED J417H

## (undated) DEVICE — GLOVE INDICATOR PI UNDERGLOVE SZ 8.5 BLUE

## (undated) DEVICE — 3M™ STERI-STRIP™ REINFORCED ADHESIVE SKIN CLOSURES, R1546, 1/4 IN X 4 IN (6 MM X 100 MM), 10 STRIPS/ENVELOPE: Brand: 3M™ STERI-STRIP™

## (undated) DEVICE — GLOVE PI ULTRA TOUCH SZ.8.0

## (undated) DEVICE — GLOVE PI ULTRA TOUCH SZ.7.0

## (undated) DEVICE — FORMALIN PRE-FILLED 10 PCT NEUTRAL 20ML

## (undated) DEVICE — NEEDLE SPINAL 22G X 3.5IN  QUINCKE

## (undated) DEVICE — SCD SEQUENTIAL COMPRESSION COMFORT SLEEVE MEDIUM KNEE LENGTH: Brand: KENDALL SCD

## (undated) DEVICE — SYRINGE 10ML LL

## (undated) DEVICE — PLUMEPEN PRO 10FT

## (undated) DEVICE — NEEDLE 25G X 1 1/2

## (undated) DEVICE — CHLORAPREP HI-LITE 26ML ORANGE

## (undated) DEVICE — TELFA NON-ADHERENT ABSORBENT DRESSING: Brand: TELFA

## (undated) DEVICE — MEDI-VAC YANK SUCT HNDL W/TPRD BULBOUS TIP: Brand: CARDINAL HEALTH

## (undated) DEVICE — URO CATCHER BAG STERILE 0-UC32

## (undated) DEVICE — ASTOUND STANDARD SURGICAL GOWN, XL: Brand: CONVERTORS

## (undated) DEVICE — EVACUATOR BLADDER ELLIK DISP STRL

## (undated) DEVICE — INTENDED FOR TISSUE SEPARATION, AND OTHER PROCEDURES THAT REQUIRE A SHARP SURGICAL BLADE TO PUNCTURE OR CUT.: Brand: BARD-PARKER ® CARBON RIB-BACK BLADES

## (undated) DEVICE — CHLORHEXIDINE 4PCT 4 OZ

## (undated) DEVICE — BETHLEHEM UNIVERSAL MINOR GEN: Brand: CARDINAL HEALTH

## (undated) DEVICE — COVER PROBE ECLIPSE

## (undated) DEVICE — CYSTO TUBING TUR Y IRRIGATION

## (undated) DEVICE — Device: Brand: OLYMPUS

## (undated) DEVICE — NEEDLE 18 G X 1 1/2

## (undated) DEVICE — PACK TUR

## (undated) DEVICE — STERILE SURGICAL LUBRICANT,  TUBE: Brand: SURGILUBE

## (undated) DEVICE — GLOVE SRG BIOGEL 7

## (undated) DEVICE — CATHETER PLUG WITH CAP: Brand: DOVER

## (undated) DEVICE — SUT VICRYL 3-0 SH 27 IN J416H

## (undated) DEVICE — SUT PDS II 1 CT-1 27 IN Z347H

## (undated) DEVICE — HEAVY DUTY TABLE COVER: Brand: CONVERTORS

## (undated) DEVICE — Device: Brand: LEVEL 1

## (undated) DEVICE — GLOVE INDICATOR PI UNDERGLOVE SZ 6.5 BLUE

## (undated) DEVICE — ULTRASOUND GEL STERILE FOIL PK

## (undated) DEVICE — TUBING SUCTION 5MM X 12 FT

## (undated) DEVICE — GLOVE PI ULTRA TOUCH SZ.6.5

## (undated) DEVICE — GAUZE SPONGES,16 PLY: Brand: CURITY

## (undated) DEVICE — DRAPE EQUIPMENT RF WAND

## (undated) DEVICE — BASIC SINGLE BASIN-LF: Brand: MEDLINE INDUSTRIES, INC.

## (undated) DEVICE — PENROSE DRAIN, 18 X 3 8: Brand: CARDINAL HEALTH

## (undated) DEVICE — GLOVE INDICATOR PI UNDERGLOVE SZ 7 BLUE

## (undated) DEVICE — BAG URINE DRAINAGE 4000ML CONTINUOUS IRR

## (undated) DEVICE — UTILITY MARKER,BLACK WITH LABELS: Brand: DEVON

## (undated) DEVICE — ABDOMINAL PAD: Brand: DERMACEA

## (undated) DEVICE — SPONGE LAP 18 X 18 IN

## (undated) DEVICE — LUBRICANT JELLY SURGILUBE TUBE 4OZ FLIP TOP